# Patient Record
Sex: FEMALE | Race: BLACK OR AFRICAN AMERICAN | NOT HISPANIC OR LATINO | Employment: PART TIME | URBAN - METROPOLITAN AREA
[De-identification: names, ages, dates, MRNs, and addresses within clinical notes are randomized per-mention and may not be internally consistent; named-entity substitution may affect disease eponyms.]

---

## 2017-01-26 ENCOUNTER — OFFICE VISIT (OUTPATIENT)
Dept: OBGYN | Facility: CLINIC | Age: 27
End: 2017-01-26
Payer: COMMERCIAL

## 2017-01-26 VITALS
HEIGHT: 65 IN | DIASTOLIC BLOOD PRESSURE: 80 MMHG | OXYGEN SATURATION: 97 % | HEART RATE: 91 BPM | BODY MASS INDEX: 19.46 KG/M2 | SYSTOLIC BLOOD PRESSURE: 136 MMHG | TEMPERATURE: 98.4 F | WEIGHT: 116.8 LBS

## 2017-01-26 DIAGNOSIS — N97.9 INFERTILITY, FEMALE, SECONDARY: Primary | ICD-10-CM

## 2017-01-26 LAB
ALBUMIN SERPL-MCNC: 4 G/DL (ref 3.4–5)
ALP SERPL-CCNC: 37 U/L (ref 40–150)
ALT SERPL W P-5'-P-CCNC: 23 U/L (ref 0–50)
ANION GAP SERPL CALCULATED.3IONS-SCNC: 10 MMOL/L (ref 3–14)
AST SERPL W P-5'-P-CCNC: 24 U/L (ref 0–45)
B-HCG SERPL-ACNC: <1 IU/L
BILIRUB SERPL-MCNC: 1 MG/DL (ref 0.2–1.3)
BUN SERPL-MCNC: 11 MG/DL (ref 7–30)
CALCIUM SERPL-MCNC: 8.8 MG/DL (ref 8.5–10.1)
CHLORIDE SERPL-SCNC: 107 MMOL/L (ref 94–109)
CO2 SERPL-SCNC: 21 MMOL/L (ref 20–32)
CREAT SERPL-MCNC: 0.74 MG/DL (ref 0.52–1.04)
ERYTHROCYTE [DISTWIDTH] IN BLOOD BY AUTOMATED COUNT: 13.7 % (ref 10–15)
ESTRADIOL SERPL-MCNC: 333 PG/ML
FSH SERPL-ACNC: 2.6 IU/L
GFR SERPL CREATININE-BSD FRML MDRD: ABNORMAL ML/MIN/1.7M2
GLUCOSE SERPL-MCNC: 81 MG/DL (ref 70–99)
HCT VFR BLD AUTO: 38 % (ref 35–47)
HGB BLD-MCNC: 12.5 G/DL (ref 11.7–15.7)
LH SERPL-ACNC: 7.9 IU/L
MCH RBC QN AUTO: 28.3 PG (ref 26.5–33)
MCHC RBC AUTO-ENTMCNC: 32.9 G/DL (ref 31.5–36.5)
MCV RBC AUTO: 86 FL (ref 78–100)
PLATELET # BLD AUTO: 263 10E9/L (ref 150–450)
POTASSIUM SERPL-SCNC: 4.1 MMOL/L (ref 3.4–5.3)
PROGEST SERPL-MCNC: 27.2 NG/ML
PROLACTIN SERPL-MCNC: 9 UG/L (ref 3–27)
PROT SERPL-MCNC: 8.2 G/DL (ref 6.8–8.8)
RBC # BLD AUTO: 4.42 10E12/L (ref 3.8–5.2)
SODIUM SERPL-SCNC: 138 MMOL/L (ref 133–144)
TSH SERPL DL<=0.05 MIU/L-ACNC: 4.14 MU/L (ref 0.4–4)
WBC # BLD AUTO: 2.9 10E9/L (ref 4–11)

## 2017-01-26 PROCEDURE — 84144 ASSAY OF PROGESTERONE: CPT | Performed by: OBSTETRICS & GYNECOLOGY

## 2017-01-26 PROCEDURE — 84443 ASSAY THYROID STIM HORMONE: CPT | Performed by: OBSTETRICS & GYNECOLOGY

## 2017-01-26 PROCEDURE — 84702 CHORIONIC GONADOTROPIN TEST: CPT | Performed by: OBSTETRICS & GYNECOLOGY

## 2017-01-26 PROCEDURE — 85027 COMPLETE CBC AUTOMATED: CPT | Performed by: OBSTETRICS & GYNECOLOGY

## 2017-01-26 PROCEDURE — 36415 COLL VENOUS BLD VENIPUNCTURE: CPT | Performed by: OBSTETRICS & GYNECOLOGY

## 2017-01-26 PROCEDURE — 84270 ASSAY OF SEX HORMONE GLOBUL: CPT | Performed by: OBSTETRICS & GYNECOLOGY

## 2017-01-26 PROCEDURE — 99204 OFFICE O/P NEW MOD 45 MIN: CPT | Performed by: OBSTETRICS & GYNECOLOGY

## 2017-01-26 PROCEDURE — 82670 ASSAY OF TOTAL ESTRADIOL: CPT | Performed by: OBSTETRICS & GYNECOLOGY

## 2017-01-26 PROCEDURE — 87491 CHLMYD TRACH DNA AMP PROBE: CPT | Performed by: OBSTETRICS & GYNECOLOGY

## 2017-01-26 PROCEDURE — 84146 ASSAY OF PROLACTIN: CPT | Performed by: OBSTETRICS & GYNECOLOGY

## 2017-01-26 PROCEDURE — 87591 N.GONORRHOEAE DNA AMP PROB: CPT | Performed by: OBSTETRICS & GYNECOLOGY

## 2017-01-26 PROCEDURE — G0145 SCR C/V CYTO,THINLAYER,RESCR: HCPCS | Performed by: OBSTETRICS & GYNECOLOGY

## 2017-01-26 PROCEDURE — 83001 ASSAY OF GONADOTROPIN (FSH): CPT | Performed by: OBSTETRICS & GYNECOLOGY

## 2017-01-26 PROCEDURE — 80053 COMPREHEN METABOLIC PANEL: CPT | Performed by: OBSTETRICS & GYNECOLOGY

## 2017-01-26 PROCEDURE — 83002 ASSAY OF GONADOTROPIN (LH): CPT | Performed by: OBSTETRICS & GYNECOLOGY

## 2017-01-26 PROCEDURE — 84403 ASSAY OF TOTAL TESTOSTERONE: CPT | Performed by: OBSTETRICS & GYNECOLOGY

## 2017-01-26 PROCEDURE — 82627 DEHYDROEPIANDROSTERONE: CPT | Performed by: OBSTETRICS & GYNECOLOGY

## 2017-01-26 NOTE — NURSING NOTE
"Chief Complaint   Patient presents with     Consult     infertility       Initial /80 mmHg  Pulse 91  Temp(Src) 98.4  F (36.9  C) (Oral)  Ht 5' 4.5\" (1.638 m)  Wt 116 lb 12.8 oz (52.98 kg)  BMI 19.75 kg/m2  SpO2 97%  LMP 01/05/2017 (Exact Date) Estimated body mass index is 19.75 kg/(m^2) as calculated from the following:    Height as of this encounter: 5' 4.5\" (1.638 m).    Weight as of this encounter: 116 lb 12.8 oz (52.98 kg).  BP completed using cuff size: elsa Apple LPN    "

## 2017-01-26 NOTE — Clinical Note
[facility] [place]  [address] [address]  [place] [address]  [phone]      February 2, 2017    Robert Anand  [address] [address] [place] [address]   [place] [address]          Dear Robert,    I am happy to inform you that your recent cervical cancer screening test (PAP smear) was normal.      Preventative screening such as this helps insure your health for years to come.  This test should be repeated in 3 years unless otherwise directed.    You will still need to return to the clinic every year for your annual exam and other preventive tests.    Please contact the clinic if you have further questions.      Sincerely,    Cephas Mawuena Agbeh, MD/sachin

## 2017-01-26 NOTE — PROGRESS NOTES
"Robert is a 27 year old  who presents with difficulty conceiving x 3 years.  Her menstrual periods are irregular.  +  - molima.  Her most recent form of birth control was none. Previous infertility work up included: semen analysis,   diagnostic laparoscopy,  In [facility] in  , she had laparoscopy to repair tubal problem in one adnexa. Since her baby was born, unable to get pregnant. Kirk was diagnosed with anovulation, Cycles irregular and > 35 days. Spouse treated with medication for low sperm count. Has been in the [place] 4 [place].     Her partner is  38 year old who has fathered children previously.  He has  exposure to toxins, radiation or excessive heat.  He is in good health.    OBHX:  x 1  Gyne: Pap smears Normal  Last pap: N/A    ROS: Ten point review of systems was reviewed and negative except the above.    PMH: Her past medical, surgical, and obstetric histories were reviewed and are documented in their appropriate chart areas.    ALL/Meds: Her medication and allergy histories were reviewed and are documented in their appropriate chart areas.    Soc Hx: - tob, - EtOH,   FamHx: N/a    PE: /80 mmHg  Pulse 91  Temp(Src) 98.4  F (36.9  C) (Oral)  Ht 5' 4.5\" (1.638 m)  Wt 116 lb 12.8 oz (52.98 kg)  BMI 19.75 kg/m2  SpO2 97%  LMP 2017 (Exact Date)    General:  WNWD female, NAD  Alert  Oriented x 3  Gait:  Normal  Skin:  Normal skin turgor  Neurologic:  CN grossly intact, good sensation.    HEENT:  NC/AT, EOMI  Neck:  No masses palpated, symmetrical, carotids +2/4, no bruits heard  Heart:  RRR  Lungs:  Clear   Breasts:  Symmetrical, no dimpling noted, no masses palpated,   Abdomen:  Non-tender, non-distended.  Vulva: No external lesions, normal hair distribution, no adenopathy  BUS:  Normal, no masses noted  Vagina: Moist, pink, no abnormal discharge, well rugated, no lesions  Cervix: Smooth, pink, no visible lesions  Uterus: Normal size, anteverted, non-tender, " mobile  Ovaries: No mass, non-tender, mobile  Rectal exam: No mass, non-tender, normal sphincter tone  Extremities:  No clubbing, cyanosis, or edema  A/P     ICD-10-CM    1. Infertility, female, secondary N97.9 Follicle stimulating hormone     Lutropin     TSH     Progesterone     HCG quantitative pregnancy     Estradiol     Testosterone Free and Total     DHEA sulfate     Prolactin     Comprehensive metabolic panel     CBC with platelets     Progesterone     HCG quantitative pregnancy     Neisseria gonorrhoeae PCR     Chlamydia trachomatis PCR     Pap imaged thin layer screen reflex to HPV if ASCUS - recommend age 25 - 29     US Pelvic Complete w Transvaginal     US Hysterosonography     Discussed male and female factors of infertility.  I discussed the association of regular ovulation and regular menstruation.  Discussed possible testing including semen analysis, laboratory evaluation of ovulation, and evaluation of uterine/tubal anatomy.    She is given the opportunity to ask questions and have them answered.     SPOUSE, HUSSAIN GARCIA, MR# [id number]. SA ordered    ACOG pamphlet provided oin the above.    return to clinic after all test results are available.    40 minutes was spent face to face with the patient today discussing her history, diagnosis, and follow-up plan as noted above.  Over 50% of the visit was spent in counseling and coordination of care.    Total Visit Time: 50 minutes.      Interpraterr present for visit.    CEPHAS AGBEH, MD.

## 2017-01-26 NOTE — Clinical Note
[facility] [place]  [address] [address]  [place] [address]  [phone]      January 31, 2017      Robert Anand  [address] [address] [place] [address]   [place] [address]              Dear Robert,    All of your labs were normal for you.  Please contact the clinic if you have additional questions.  Thank you.      Sincerely,      Cephas Mawuena Agbeh MD

## 2017-01-26 NOTE — PATIENT INSTRUCTIONS
If you have any questions regarding your visit, Please contact your care team.    Women s Health CLINIC HOURS TELEPHONE NUMBER   Andrews Agbeh, M.D.    Jeanne Mendez- DAVIAN Zuleta - DAVIAN [place] -         Monday-[facility]  8:00 am - 5 pm  Tuesday- [facility]  8:00am- 5 pm  Wednesday- Off  Thursday- [facility]  8:00 am- 5 pm  Friday-[place]  8:00 am 5 pm [facility]  [address] [place] [address]  [phone] ask for Women's [facility]    Imaging [phone]    [facility]  [address]  [place] [address] [phone]  Imaging [phone]     Urgent Care locations:    [facility] Saturday and Sunday   9 am - 5 pm    Monday-Friday   12 pm - 8 pm  Saturday and Sunday   9 am - 5 pm   [phone]       If you need a medication refill, please contact your pharmacy. Please allow 3 business days for your refill to be completed.  As always, Thank you for trusting us with your healthcare needs!       ian

## 2017-01-26 NOTE — MR AVS SNAPSHOT
After Visit Summary   1/26/2017    Robert Anand    MRN: [id number]           Patient Information     Date Of Birth          1990        Visit Information        Provider Department      1/26/2017 8:15 AM Agbeh, Cephas Mawuena, MD; [employer] [facility]        Today's Diagnoses     Infertility, female, secondary    -  1       Care Instructions                                                           If you have any questions regarding your visit, Please contact your care team.    Women s Health CLINIC HOURS TELEPHONE NUMBER   Cephas Agbeh, M.D.    Jeanne Mendez- DAVIAN Zuleta - DAVIAN [place] -         Monday-[facility]  8:00 am - 5 pm  Tuesday- [facility]  8:00am- 5 pm  Wednesday- Off  Thursday- [facility]  8:00 am- 5 pm  Friday-[place]  8:00 am 5 pm [facility]  [address] [place] [address]  [phone] ask for Women's Clinic    Imaging [phone]    [facility]  [address] [place] [address]  [phone]  Imaging [phone]     Urgent Care locations:    [facility] Saturday and Sunday   9 am - 5 pm    Monday-Friday   12 pm - 8 pm  Saturday and Sunday   9 am - 5 pm   [phone]       If you need a medication refill, please contact your pharmacy. Please allow 3 business days for your refill to be completed.  As always, Thank you for trusting us with your healthcare needs!       m        Follow-ups after your visit        Future tests that were ordered for you today     Open Standing Orders        Priority Remaining Interval Expires Ordered    Progesterone Routine 6/6 1/26/2018 1/26/2017    HCG quantitative pregnancy Routine 6/6 1/26/2018 1/26/2017          Open Future Orders        Priority Expected Expires Ordered    US Hysterosonography Routine  1/26/2018 1/26/2017    US Pelvic Complete w Transvaginal Routine   "2018            Who to contact     If you have questions or need follow up information about today's clinic visit or your schedule please contact [facility] directly at [phone].  Normal or non-critical lab and imaging results will be communicated to you by MyChart, letter or phone within 4 business days after the clinic has received the results. If you do not hear from us within 7 days, please contact the clinic through MyChart or phone. If you have a critical or abnormal lab result, we will notify you by phone as soon as possible.  Submit refill requests through [employer] or call your pharmacy and they will forward the refill request to us. Please allow 3 business days for your refill to be completed.          Additional Information About Your Visit        [email or web address] Information     [employer] lets you send messages to your doctor, view your test results, renew your prescriptions, schedule appointments and more. To sign up, go to [email or web address] . Click on \"Log in\" on the left side of the screen, which will take you to the Welcome page. Then click on \"Sign up Now\" on the right side of the page.     You will be asked to enter the access code listed below, as well as some personal information. Please follow the directions to create your username and password.     Your access code is: [email or web address]  Expires: 2017  9:54 AM     Your access code will  in 90 days. If you need help or a new code, please call your [place] clinic or [phone].        Care EveryWhere ID     This is your Care EveryWhere ID. This could be used by other organizations to access your [place] medical records  [id number]        Your Vitals Were     Pulse Temperature Height BMI (Body Mass Index) Pulse Oximetry Last Period    91 98.4  F (36.9  C) (Oral) 5' 4.5\" (1.638 m) 19.75 kg/m2 97% 2017 (Exact Date)       Blood Pressure from Last 3 Encounters:   17 136/80    Weight from Last 3 " Encounters:   01/26/17 116 lb 12.8 oz (52.98 kg)              We Performed the Following     CBC with platelets     Chlamydia trachomatis PCR     Comprehensive metabolic panel     DHEA sulfate     Estradiol     Follicle stimulating hormone     HCG quantitative pregnancy     Lutropin     Neisseria gonorrhoeae PCR     Pap imaged thin layer screen reflex to HPV if ASCUS - recommend age 25 - 29     Progesterone     Prolactin     Testosterone Free and Total     TSH        Primary Care Provider Office Phone #    Avni Vargas [facility] [phone]       No address on file        Thank you!     Thank you for choosing [facility]  for your care. Our goal is always to provide you with excellent care. Hearing back from our patients is one way we can continue to improve our services. Please take a few minutes to complete the written survey that you may receive in the mail after your visit with us. Thank you!             Your Updated Medication List - Protect others around you: Learn how to safely use, store and throw away your medicines at www.disposemymeds.org.      Notice  As of 1/26/2017  9:57 AM    You have not been prescribed any medications.

## 2017-01-27 LAB
C TRACH DNA SPEC QL NAA+PROBE: NORMAL
DHEA-S SERPL-MCNC: 68 UG/DL (ref 35–430)
N GONORRHOEA DNA SPEC QL NAA+PROBE: NORMAL
SPECIMEN SOURCE: NORMAL
SPECIMEN SOURCE: NORMAL

## 2017-01-30 ENCOUNTER — RADIANT APPOINTMENT (OUTPATIENT)
Dept: ULTRASOUND IMAGING | Facility: CLINIC | Age: 27
End: 2017-01-30
Attending: OBSTETRICS & GYNECOLOGY
Payer: COMMERCIAL

## 2017-01-30 DIAGNOSIS — N97.9 INFERTILITY, FEMALE, SECONDARY: ICD-10-CM

## 2017-01-30 PROCEDURE — 76856 US EXAM PELVIC COMPLETE: CPT

## 2017-01-30 PROCEDURE — 76830 TRANSVAGINAL US NON-OB: CPT

## 2017-01-31 LAB
COPATH REPORT: NORMAL
PAP: NORMAL
SHBG SERPL-SCNC: 102 NMOL/L (ref 30–135)
TESTOST FREE SERPL-MCNC: 0.22 NG/DL (ref 0.08–0.74)
TESTOST SERPL-MCNC: 31 NG/DL (ref 8–60)

## 2017-02-04 ENCOUNTER — TELEPHONE (OUTPATIENT)
Dept: OBGYN | Facility: CLINIC | Age: 27
End: 2017-02-04

## 2017-02-04 NOTE — TELEPHONE ENCOUNTER
Robert was told to schedule an HSG procedure when she gets her period. Her  called to schedule today but the after hours line cannot schedule this appointment. Please call him at [phone] on Monday. They would like to have the appointment on Tuesday if possible.      Thank you,  Cammie LAM

## 2017-02-09 ENCOUNTER — RADIANT APPOINTMENT (OUTPATIENT)
Dept: GENERAL RADIOLOGY | Facility: CLINIC | Age: 27
End: 2017-02-09
Attending: OBSTETRICS & GYNECOLOGY
Payer: COMMERCIAL

## 2017-02-09 DIAGNOSIS — N97.9 INFERTILITY, FEMALE: ICD-10-CM

## 2017-02-09 PROCEDURE — 58340 CATHETER FOR HYSTEROGRAPHY: CPT

## 2017-02-09 PROCEDURE — 74740 X-RAY FEMALE GENITAL TRACT: CPT

## 2017-02-09 RX ORDER — IOPAMIDOL 510 MG/ML
50 INJECTION, SOLUTION INTRAVASCULAR ONCE
Status: COMPLETED | OUTPATIENT
Start: 2017-02-09 | End: 2017-02-09

## 2017-02-09 RX ADMIN — IOPAMIDOL 50 ML: 510 INJECTION, SOLUTION INTRAVASCULAR at 11:45

## 2017-02-20 ENCOUNTER — OFFICE VISIT (OUTPATIENT)
Dept: OBGYN | Facility: CLINIC | Age: 27
End: 2017-02-20
Payer: COMMERCIAL

## 2017-02-20 VITALS
SYSTOLIC BLOOD PRESSURE: 121 MMHG | TEMPERATURE: 97.4 F | OXYGEN SATURATION: 100 % | DIASTOLIC BLOOD PRESSURE: 82 MMHG | HEART RATE: 75 BPM

## 2017-02-20 DIAGNOSIS — N92.6 IRREGULAR MENSES: ICD-10-CM

## 2017-02-20 DIAGNOSIS — N46.9 MALE INFERTILITY: ICD-10-CM

## 2017-02-20 DIAGNOSIS — N97.9 FEMALE INFERTILITY: Primary | ICD-10-CM

## 2017-02-20 PROCEDURE — 99214 OFFICE O/P EST MOD 30 MIN: CPT | Performed by: OBSTETRICS & GYNECOLOGY

## 2017-02-20 NOTE — NURSING NOTE
"Chief Complaint   Patient presents with     Gyn Exam     follow up fertility       Initial /82  Pulse 75  Temp 97.4  F (36.3  C) (Tympanic)  LMP 02/03/2017  SpO2 100% Estimated body mass index is 19.74 kg/(m^2) as calculated from the following:    Height as of 1/26/17: 5' 4.5\" (1.638 m).    Weight as of 1/26/17: 116 lb 12.8 oz (53 kg).  Medication Reconciliation: complete     Jeanne Apple LPN    "

## 2017-02-20 NOTE — PROGRESS NOTES
Robert is a 27 year old  here for follow up of infertility work up results. All her labs and imaging studies  are normal so far. Spouse( HUSSAIN GARCIA) Smen analysis was abnormal. He has a referral appointment with Male infertility Urologist on 3/30/17    Results for orders placed or performed in visit on 17   XR Hysterosalpingogram    Narrative    HSG    Comparisons: None    HISTORY:    Infertility    TECHNIQUE:  Using aseptic technique the cervix was visualized and cleaned with  Betadine solution. A balloon tipped catheter passed easily into the  cervix. The balloon was inflated with 1 cc of sterile saline. The  speculum was removed and contrast was injected while monitoring with  fluoroscopy.    The uterus is normal in appearance. Both fallopian tubes fill normally  with normal spillage into the peritoneal cavity.     The catheter was removed. Care instructions were discussed with the  patient.      Impression    IMPRESSION: Normal HSG.     ANISH KABA MD       ROS: Ten point review of systems was reviewed and negative except the above.  Obstetric History       T1      TAB0   SAB0   E0   M0   L1       # Outcome Date GA Lbr Nabeel/2nd Weight Sex Delivery Anes PTL Lv   1 Term 14   8 lb 6 oz (3.8 kg) M Vag-Spont   Y          Past Surgical History   Procedure Laterality Date     Laproscopy       infertility       PMH: Her past medical, surgical, and obstetric histories were reviewed and are documented in their appropriate chart areas.    ALL/Meds: Her medication and allergy histories were reviewed and are documented in their appropriate chart areas.    SH: - tob, - EtOH,     PE: /82  Pulse 75  Temp 97.4  F (36.3  C) (Tympanic)  LMP 2017  SpO2 100%    General:  WNWD female, NAD  Alert  Oriented x 3  Gait:  Normal  Skin:  Normal skin turgor  HEENT:  NC/AT, EOMI  Abdomen:  Non-tender, non-distended.  Pelvic exam:  Not performed  Extremities:  No clubbing, no  cyanosis and no edema.      A/P     ICD-10-CM    1. Female infertility N97.9    2. Male infertility N46.9    3. Irregular menses N92.6     Discussed all her test results and her spouse abnormal SA.  Will await results from infertility work up from Dr. Barroso.   Patient and spouse used clomid to get pregnant with their son in [place] 3 years ago.    Consider clomid for patient.  25 minutes was spent face to face with the patient today discussing her history, diagnosis, and follow-up plan as noted above.  Over 50% of the visit was spent in counseling and coordination of care.    Total Visit Time: 35 minutes.    Patient , spouse,and interprater were present for visit.  Appointment fir spouse with UROLOGY was made today by my office.    CEPHAS AGBEH, MD.

## 2017-02-20 NOTE — MR AVS SNAPSHOT
"              After Visit Summary   2017    Robert Anand    MRN: [id number]           Patient Information     Date Of Birth          1990        Visit Information        Provider Department      2017 8:15 AM Agbeh, Cephas Mawuena, MD; [employer] [facility] [place]        Today's Diagnoses     Female infertility    -  1    Male infertility        Irregular menses           Follow-ups after your visit        Follow-up notes from your care team     Return in about 2 months (around 2017).      Who to contact     If you have questions or need follow up information about today's clinic visit or your schedule please contact [facility] [place] directly at [phone].  Normal or non-critical lab and imaging results will be communicated to you by MyChart, letter or phone within 4 business days after the clinic has received the results. If you do not hear from us within 7 days, please contact the clinic through MyChart or phone. If you have a critical or abnormal lab result, we will notify you by phone as soon as possible.  Submit refill requests through [employer] or call your pharmacy and they will forward the refill request to us. Please allow 3 business days for your refill to be completed.          Additional Information About Your Visit        MyChart Information     [employer] lets you send messages to your doctor, view your test results, renew your prescriptions, schedule appointments and more. To sign up, go to [email or web address] . Click on \"Log in\" on the left side of the screen, which will take you to the Welcome page. Then click on \"Sign up Now\" on the right side of the page.     You will be asked to enter the access code listed below, as well as some personal information. Please follow the directions to create your username and password.     Your access code is: [email or web address]  Expires: 2017  9:54 AM     Your access code will  in 90 days. If you need help or " a new code, please call your [place] clinic or [phone].        Care EveryWhere ID     This is your Care EveryWhere ID. This could be used by other organizations to access your [place] medical records  [id number]        Your Vitals Were     Pulse Temperature Last Period Pulse Oximetry          75 97.4  F (36.3  C) (Tympanic) 02/03/2017 100%         Blood Pressure from Last 3 Encounters:   02/20/17 121/82   01/26/17 136/80    Weight from Last 3 Encounters:   01/26/17 116 lb 12.8 oz (53 kg)              Today, you had the following     No orders found for display       Primary Care Provider Office Phone #    Avni Vargas [facility] [phone]       No address on file        Thank you!     Thank you for choosing [facility]  for your care. Our goal is always to provide you with excellent care. Hearing back from our patients is one way we can continue to improve our services. Please take a few minutes to complete the written survey that you may receive in the mail after your visit with us. Thank you!             Your Updated Medication List - Protect others around you: Learn how to safely use, store and throw away your medicines at www.disposemymeds.org.      Notice  As of 2/20/2017 10:07 AM    You have not been prescribed any medications.

## 2017-10-20 ENCOUNTER — OFFICE VISIT (OUTPATIENT)
Dept: OBGYN | Facility: CLINIC | Age: 27
End: 2017-10-20
Payer: COMMERCIAL

## 2017-10-20 VITALS
TEMPERATURE: 96.5 F | WEIGHT: 106.4 LBS | HEART RATE: 72 BPM | OXYGEN SATURATION: 100 % | BODY MASS INDEX: 17.98 KG/M2 | SYSTOLIC BLOOD PRESSURE: 130 MMHG | DIASTOLIC BLOOD PRESSURE: 83 MMHG

## 2017-10-20 DIAGNOSIS — N91.2 ABSENCE OF MENSTRUATION: Primary | ICD-10-CM

## 2017-10-20 DIAGNOSIS — N97.9 INFERTILITY, FEMALE, SECONDARY: ICD-10-CM

## 2017-10-20 DIAGNOSIS — R53.83 FATIGUE: Primary | ICD-10-CM

## 2017-10-20 DIAGNOSIS — O21.0 HYPEREMESIS GRAVIDARUM: ICD-10-CM

## 2017-10-20 LAB — BETA HCG QUAL IFA URINE: POSITIVE

## 2017-10-20 PROCEDURE — 84703 CHORIONIC GONADOTROPIN ASSAY: CPT | Performed by: OBSTETRICS & GYNECOLOGY

## 2017-10-20 PROCEDURE — 87086 URINE CULTURE/COLONY COUNT: CPT | Performed by: OBSTETRICS & GYNECOLOGY

## 2017-10-20 PROCEDURE — 99214 OFFICE O/P EST MOD 30 MIN: CPT | Performed by: OBSTETRICS & GYNECOLOGY

## 2017-10-20 RX ORDER — PRENATAL VIT/IRON FUM/FOLIC AC 27MG-0.8MG
1 TABLET ORAL DAILY
Qty: 100 TABLET | Refills: 3 | Status: SHIPPED | OUTPATIENT
Start: 2017-10-20 | End: 2018-02-09

## 2017-10-20 RX ORDER — ONDANSETRON 8 MG/1
8 TABLET, ORALLY DISINTEGRATING ORAL
Qty: 20 TABLET | Refills: 1 | Status: SHIPPED | OUTPATIENT
Start: 2017-10-20 | End: 2018-04-12

## 2017-10-20 NOTE — PATIENT INSTRUCTIONS
If you have any questions regarding your visit, Please contact your care team.    Women s Health CLINIC HOURS TELEPHONE NUMBER   Andrews Agbeh, M.D.    Jeanne Mendez- DAVIAN Lewis - DAVIAN [place] -         Monday-[facility]  8:00 am - 5 pm  Tuesday- [facility]  8:00am- 5 pm  Wednesday- Off  Thursday- [facility]  8:00 am- 5 pm  Friday-[place]  8:00 am 5 pm [facility]  [address] [place] [address]  [phone] ask for Women's [facility]    Imaging [phone]    [facility]  [address] [facility] [address] [facility] [address]  [place] [address] [phone]  Imaging [phone]     Urgent Care locations:    [facility] Saturday and Sunday   9 am - 5 pm    Monday-Friday   12 pm - 8 pm  Saturday and Sunday   9 am - 5 pm   [phone]       If you need a medication refill, please contact your pharmacy. Please allow 3 business days for your refill to be completed.  As always, Thank you for trusting us with your healthcare needs!

## 2017-10-20 NOTE — NURSING NOTE
"Chief Complaint   Patient presents with     Confirmation Of Pregnancy       Initial /83  Pulse 72  Temp 96.5  F (35.8  C) (Tympanic)  Wt 106 lb 6.4 oz (48.3 kg)  LMP 08/27/2017  SpO2 100%  BMI 17.98 kg/m2 Estimated body mass index is 17.98 kg/(m^2) as calculated from the following:    Height as of 1/26/17: 5' 4.5\" (1.638 m).    Weight as of this encounter: 106 lb 6.4 oz (48.3 kg).  Medication Reconciliation: complete   Jeanne Apple LPN    "

## 2017-10-20 NOTE — PROGRESS NOTES
Robert is a 27 year old  referred here by self for consultation regarding pregnancy confirmation. LMP of 17. Approx. 8 weeks . Complaints of severe NV since beginning of this month.   Here with spouse who speaks English. She understands some some English. Palestinian Inerptrater did not show. .    ROS: Ten point review of systems was reviewed and negative except the above.    Gyne: - abn pap (last pap ), - STD's    No past medical history on file.  Past Surgical History:   Procedure Laterality Date     laproscopy      infertility     Patient Active Problem List   Diagnosis     Female infertility     Male infertility     Irregular menses       ALL/Meds: Her medication and allergy histories were reviewed and are documented in their appropriate chart areas.    SH: - tob, - EtOH,     FH: Her family history was reviewed and documented in its appropriate chart area.    PE: /83  Pulse 72  Temp 96.5  F (35.8  C) (Tympanic)  Wt 106 lb 6.4 oz (48.3 kg)  LMP 2017  SpO2 100%  BMI 17.98 kg/m2  Body mass index is 17.98 kg/(m^2).      General:  WNWD female, NAD  Alert  Oriented x 3  Gait:  Normal  Skin:  Normal skin turgor  HEENT:  NC/AT, EOMI  Abdomen:  Non-tender, non-distended.  Pelvic exam:  Not performed  Extremities:  No clubbing, no cyanosis and no edema.    Exam Information   Exam Date Exam Time Accession # Results    10/20/17 11:58 AM [id number]    Component Results   Component Value Flag Ref Range Units Status Collected Lab   Beta HCG Qual IFA Urine Positive (A) NEG^Negative    Final 10/20/2017 11:58 AM BE           A/P    ICD-10-CM    1. Absence of menstruation N91.2 ondansetron (ZOFRAN ODT) 8 MG ODT tab     Prenatal Vit-Fe Fumarate-FA (PRENATAL MULTIVITAMIN PLUS IRON) 27-0.8 MG TABS per tablet      OB < 14 Weeks Single   2. Hyperemesis gravidarum O21.0 ondansetron (ZOFRAN ODT) 8 MG ODT tab     Prenatal Vit-Fe Fumarate-FA (PRENATAL MULTIVITAMIN PLUS IRON) 27-0.8 MG TABS per  tablet     Prenatal package provided.  Antiemetics and PNV ordered.  Work note forms filled.  Work note for spouse provided.  return to clinic in 4 weeks for FOB exam.      25 minutes was spent face to face with the patient today discussing her history, diagnosis, and follow-up plan as noted above.  Over 50% of the visit was spent in counseling and coordination of care.    Total Visit Time: 30 minutes.        CEPHAS AGBEH, MD.

## 2017-10-20 NOTE — LETTER
October 20, 2017              To Whom It May Concern,     Robert Anand was seen in the clinic today.      Sincerely,        Cephas Mawuena Agbeh, MD

## 2017-10-20 NOTE — MR AVS SNAPSHOT
After Visit Summary   10/20/2017    Robert Anand    MRN: [id number]           Patient Information     Date Of Birth          1990        Visit Information        Provider Department      10/20/2017 11:00 AM Agbeh, Cephas Mawuena, MD; MULTILINGUAL WORD [facility]        Today's Diagnoses     Absence of menstruation    -  1    Hyperemesis gravidarum          Care Instructions                                                           If you have any questions regarding your visit, Please contact your care team.    Women s Health CLINIC HOURS TELEPHONE NUMBER   Cephas Agbeh, M.D.    Jeanne - ROSALVA Mendez- RN    Debbie - RN    Mell -         Monday-[facility]  8:00 am - 5 pm  Tuesday- [facility]  8:00am- 5 pm  Wednesday- Off  Thursday- [facility]  8:00 am- 5 pm  Friday-[place]  8:00 am 5 pm [facility]  [address] [place] [address]  [phone] ask for Women's Clinic    Imaging [phone]    [facility]  [address]  [place] [address]  [phone]  Imaging [phone]     Urgent Care locations:    [facility] Saturday and Sunday   9 am - 5 pm    Monday-Friday   12 pm - 8 pm  Saturday and Sunday   9 am - 5 pm   [phone]       If you need a medication refill, please contact your pharmacy. Please allow 3 business days for your refill to be completed.  As always, Thank you for trusting us with your healthcare needs!                 Follow-ups after your visit        Your next 10 appointments already scheduled     Oct 20, 2017  1:45 PM CDT   LAB with BE LAB   [facility]    [address] [id number] [phone]           Patient must bring picture ID. Patient should be prepared to give a urine specimen  Please do not eat 10-12 hours before your appointment if you are coming in fasting  for labs on lipids, cholesterol, or glucose (sugar). Pregnant women should follow their Care Team instructions. Water with medications is okay. Do not drink coffee or other fluids. If you have concerns about taking  your medications, please ask at office or if scheduling via [employer], send a message by clicking on Secure Messaging, Message Your Care Team.            Nov 17, 2017  8:15 AM CST   US OB < 14 WEEKS SINGLE with BEUS1   [facility]    [address] [id number] [phone]           Please bring a list of your medicines (including vitamins, minerals and over-the-counter drugs). Also, tell your doctor about any allergies you may have. Wear comfortable clothes and leave your valuables at home.  If you re less than 20 weeks drink four 8-ounce glasses of fluid an hour before your exam. If you need to empty your bladder before your exam, try to release only a little urine. Then, drink another glass of fluid.  You may have up to two family members in the exam room. If you bring a small child, an adult must be there to care for him or her.  Please call the Imaging Department at your exam site with any questions.            Nov 17, 2017  9:00 AM CST   New Prenatal with Cephas Mawuena Agbeh, MD   [facility] ([facility]    [address] [id number] [phone]              Future tests that were ordered for you today     Open Future Orders        Priority Expected Expires Ordered    US OB < 14 Weeks Single Routine 10/20/2017 1/18/2018 10/20/2017            Who to contact     If you have questions or need follow up information about today's clinic visit or your schedule please contact [facility] directly at [phone].  Normal or non-critical lab and imaging results will be communicated to you by MyChart, letter or phone within 4 business days after the clinic has received the results. If  "you do not hear from us within 7 days, please contact the clinic through [employer] or phone. If you have a critical or abnormal lab result, we will notify you by phone as soon as possible.  Submit refill requests through [employer] or call your pharmacy and they will forward the refill request to us. Please allow 3 business days for your refill to be completed.          Additional Information About Your Visit        Work in [employer] Information     [employer] lets you send messages to your doctor, view your test results, renew your prescriptions, schedule appointments and more. To sign up, go to [email or web address] . Click on \"Log in\" on the left side of the screen, which will take you to the Welcome page. Then click on \"Sign up Now\" on the right side of the page.     You will be asked to enter the access code listed below, as well as some personal information. Please follow the directions to create your username and password.     Your access code is: [email or web address]  Expires: 2018 12:47 PM     Your access code will  in 90 days. If you need help or a new code, please call your [place] clinic or [phone].        Care EveryWhere ID     This is your Care EveryWhere ID. This could be used by other organizations to access your [place] medical records  [id number]        Your Vitals Were     Pulse Temperature Last Period Pulse Oximetry BMI (Body Mass Index)       72 96.5  F (35.8  C) (Tympanic) 2017 100% 17.98 kg/m2        Blood Pressure from Last 3 Encounters:   10/20/17 130/83   17 121/82   17 136/80    Weight from Last 3 Encounters:   10/20/17 106 lb 6.4 oz (48.3 kg)   17 116 lb 12.8 oz (53 kg)                 Today's Medication Changes          These changes are accurate as of: 10/20/17 12:47 PM.  If you have any questions, ask your nurse or doctor.               Start taking these medicines.        Dose/Directions    ondansetron 8 MG ODT tab   Commonly known as:  ZOFRAN ODT   Used for:  " Hyperemesis gravidarum, Absence of menstruation   Started by:  Agbeh, Cephas Mawuena, MD        Dose:  8 mg   Take 1 tablet (8 mg) by mouth 3 times daily (before meals)   Quantity:  20 tablet   Refills:  1       prenatal multivitamin plus iron 27-0.8 MG Tabs per tablet   Used for:  Hyperemesis gravidarum, Absence of menstruation   Started by:  Agbeh, Cephas Mawuena, MD        Dose:  1 tablet   Take 1 tablet by mouth daily   Quantity:  100 tablet   Refills:  3            Where to get your medicines      These medications were sent to [facility] - [address]  [address] [place] [address]     Phone:  [phone]     ondansetron 8 MG ODT tab    prenatal multivitamin plus iron 27-0.8 MG Tabs per tablet                Primary Care Provider Office Phone # Fax #    [facility] [phone] [address]  [place] [address]        Equal Access to Services     [facility] : Hadii aad ku hadasho Soomaali, waaxda luqadaha, qaybta kaalmada adeegyada, waxay idiin hayjojon joy matias . So [facility] [phone].    ATENCIÓN: Si habla español, tiene a ramos disposición servicios gratuitos de asistencia lingüística. Llame al [phone].    We comply with applicable federal civil rights laws and [place] laws. We do not discriminate on the basis of race, color, national origin, age, disability, sex, sexual orientation, or gender identity.            Thank you!     Thank you for choosing [facility]  for your care. Our goal is always to provide you with excellent care. Hearing back from our patients is one way we can continue to improve our services. Please take a few minutes to complete the written survey that you may receive in the mail after your visit with us. Thank you!             Your Updated Medication List - Protect others around you: Learn how to safely use, store and throw away your medicines at www.disposemymeds.org.           This list is accurate as of: 10/20/17 12:47 PM.  Always use your most recent med list.                   Brand Name Dispense Instructions for use Diagnosis    ondansetron 8 MG ODT tab    ZOFRAN ODT    20 tablet    Take 1 tablet (8 mg) by mouth 3 times daily (before meals)    Hyperemesis gravidarum, Absence of menstruation       prenatal multivitamin plus iron 27-0.8 MG Tabs per tablet     100 tablet    Take 1 tablet by mouth daily    Hyperemesis gravidarum, Absence of menstruation

## 2017-10-21 LAB
BACTERIA SPEC CULT: NO GROWTH
SPECIMEN SOURCE: NORMAL

## 2017-11-17 ENCOUNTER — RADIANT APPOINTMENT (OUTPATIENT)
Dept: ULTRASOUND IMAGING | Facility: CLINIC | Age: 27
End: 2017-11-17
Attending: OBSTETRICS & GYNECOLOGY
Payer: COMMERCIAL

## 2017-11-17 ENCOUNTER — PRENATAL OFFICE VISIT (OUTPATIENT)
Dept: OBGYN | Facility: CLINIC | Age: 27
End: 2017-11-17
Payer: COMMERCIAL

## 2017-11-17 VITALS
WEIGHT: 105.6 LBS | TEMPERATURE: 97.2 F | OXYGEN SATURATION: 100 % | HEART RATE: 70 BPM | DIASTOLIC BLOOD PRESSURE: 71 MMHG | SYSTOLIC BLOOD PRESSURE: 115 MMHG | BODY MASS INDEX: 17.85 KG/M2

## 2017-11-17 DIAGNOSIS — N91.2 ABSENCE OF MENSTRUATION: ICD-10-CM

## 2017-11-17 DIAGNOSIS — Z34.80 SUPERVISION OF OTHER NORMAL PREGNANCY, ANTEPARTUM: Primary | ICD-10-CM

## 2017-11-17 LAB
ABO + RH BLD: NORMAL
ABO + RH BLD: NORMAL
BASOPHILS # BLD AUTO: 0 10E9/L (ref 0–0.2)
BASOPHILS NFR BLD AUTO: 0 %
BLD GP AB SCN SERPL QL: NORMAL
BLOOD BANK CMNT PATIENT-IMP: NORMAL
DIFFERENTIAL METHOD BLD: ABNORMAL
EOSINOPHIL # BLD AUTO: 0 10E9/L (ref 0–0.7)
EOSINOPHIL NFR BLD AUTO: 0.4 %
ERYTHROCYTE [DISTWIDTH] IN BLOOD BY AUTOMATED COUNT: 21.4 % (ref 10–15)
HBV SURFACE AG SERPL QL IA: NONREACTIVE
HCT VFR BLD AUTO: 33.5 % (ref 35–47)
HGB BLD-MCNC: 10.9 G/DL (ref 11.7–15.7)
HIV 1+2 AB+HIV1 P24 AG SERPL QL IA: NONREACTIVE
LYMPHOCYTES # BLD AUTO: 1 10E9/L (ref 0.8–5.3)
LYMPHOCYTES NFR BLD AUTO: 40.7 %
MCH RBC QN AUTO: 24.8 PG (ref 26.5–33)
MCHC RBC AUTO-ENTMCNC: 32.5 G/DL (ref 31.5–36.5)
MCV RBC AUTO: 76 FL (ref 78–100)
MONOCYTES # BLD AUTO: 0.3 10E9/L (ref 0–1.3)
MONOCYTES NFR BLD AUTO: 10.1 %
NEUTROPHILS # BLD AUTO: 1.2 10E9/L (ref 1.6–8.3)
NEUTROPHILS NFR BLD AUTO: 48.8 %
PLATELET # BLD AUTO: 298 10E9/L (ref 150–450)
RBC # BLD AUTO: 4.39 10E12/L (ref 3.8–5.2)
SPECIMEN EXP DATE BLD: NORMAL
WBC # BLD AUTO: 2.5 10E9/L (ref 4–11)

## 2017-11-17 PROCEDURE — 86850 RBC ANTIBODY SCREEN: CPT | Performed by: OBSTETRICS & GYNECOLOGY

## 2017-11-17 PROCEDURE — 76801 OB US < 14 WKS SINGLE FETUS: CPT

## 2017-11-17 PROCEDURE — 99207 ZZC FIRST OB VISIT: CPT | Performed by: OBSTETRICS & GYNECOLOGY

## 2017-11-17 PROCEDURE — 36415 COLL VENOUS BLD VENIPUNCTURE: CPT | Performed by: OBSTETRICS & GYNECOLOGY

## 2017-11-17 PROCEDURE — G0499 HEPB SCREEN HIGH RISK INDIV: HCPCS | Performed by: OBSTETRICS & GYNECOLOGY

## 2017-11-17 PROCEDURE — 87389 HIV-1 AG W/HIV-1&-2 AB AG IA: CPT | Performed by: OBSTETRICS & GYNECOLOGY

## 2017-11-17 PROCEDURE — 86762 RUBELLA ANTIBODY: CPT | Performed by: OBSTETRICS & GYNECOLOGY

## 2017-11-17 PROCEDURE — 87491 CHLMYD TRACH DNA AMP PROBE: CPT | Performed by: OBSTETRICS & GYNECOLOGY

## 2017-11-17 PROCEDURE — 85025 COMPLETE CBC W/AUTO DIFF WBC: CPT | Performed by: OBSTETRICS & GYNECOLOGY

## 2017-11-17 PROCEDURE — 86900 BLOOD TYPING SEROLOGIC ABO: CPT | Performed by: OBSTETRICS & GYNECOLOGY

## 2017-11-17 PROCEDURE — 86901 BLOOD TYPING SEROLOGIC RH(D): CPT | Performed by: OBSTETRICS & GYNECOLOGY

## 2017-11-17 PROCEDURE — 86780 TREPONEMA PALLIDUM: CPT | Performed by: OBSTETRICS & GYNECOLOGY

## 2017-11-17 PROCEDURE — 87591 N.GONORRHOEAE DNA AMP PROB: CPT | Performed by: OBSTETRICS & GYNECOLOGY

## 2017-11-17 NOTE — MR AVS SNAPSHOT
After Visit Summary   11/17/2017    Robert Anand    MRN: [id number]           Patient Information     Date Of Birth          1990        Visit Information        Provider Department      11/17/2017 9:00 AM Agbeh, Cephas Mawuena, MD; PHONE,  [facility] [place]        Care Instructions                                                           If you have any questions regarding your visit, Please contact your care team.    Women s Health CLINIC HOURS TELEPHONE NUMBER   Cephas Agbeh, M.D.    Jeanne - ROSALVA Mendez- DAVIAN Lewis - RN    Mell -         Monday-[facility]  8:00 am - 5 pm  Tuesday- [facility]  8:00am- 5 pm  Wednesday- Off  Thursday- [facility]  8:00 am- 5 pm  Friday-[place]  8:00 am 5 pm [facility]  [address]  [place], [place] [address] [phone] ask for Women's Clinic    Imaging [phone]    [facility]  [address]  [place] [address] [phone]  Imaging [phone]     Urgent Care locations:    [facility] Saturday and Sunday   9 am - 5 pm    Monday-Friday   12 pm - 8 pm  Saturday and Sunday   9 am - 5 pm   [phone]       If you need a medication refill, please contact your pharmacy. Please allow 3 business days for your refill to be completed.  As always, Thank you for trusting us with your healthcare needs!                 Follow-ups after your visit        Your next 10 appointments already scheduled     Dec 22, 2017  8:00 AM CST   ESTABLISHED PRENATAL with Cephas Mawuena Agbeh, MD   [facility] ([facility]    [address]  [place] [address] [phone]              Who to contact     If you have questions or need follow up information about today's clinic visit or your schedule please contact [facility] [place] directly at [phone].  Normal or non-critical lab and imaging  "results will be communicated to you by MyChart, letter or phone within 4 business days after the clinic has received the results. If you do not hear from us within 7 days, please contact the clinic through [employer] or phone. If you have a critical or abnormal lab result, we will notify you by phone as soon as possible.  Submit refill requests through [employer] or call your pharmacy and they will forward the refill request to us. Please allow 3 business days for your refill to be completed.          Additional Information About Your Visit        [employer] Information     [employer] lets you send messages to your doctor, view your test results, renew your prescriptions, schedule appointments and more. To sign up, go to [email or web address] . Click on \"Log in\" on the left side of the screen, which will take you to the Welcome page. Then click on \"Sign up Now\" on the right side of the page.     You will be asked to enter the access code listed below, as well as some personal information. Please follow the directions to create your username and password.     Your access code is: [email or web address]  Expires: 2018 11:47 AM     Your access code will  in 90 days. If you need help or a new code, please call your [place] clinic or [phone].        Care EveryWhere ID     This is your Care EveryWhere ID. This could be used by other organizations to access your [place] medical records  [id number]        Your Vitals Were     Pulse Temperature Last Period Pulse Oximetry BMI (Body Mass Index)       70 97.2  F (36.2  C) (Tympanic) 2017 100% 17.85 kg/m2        Blood Pressure from Last 3 Encounters:   17 115/71   10/20/17 130/83   17 121/82    Weight from Last 3 Encounters:   17 105 lb 9.6 oz (47.9 kg)   10/20/17 106 lb 6.4 oz (48.3 kg)   17 116 lb 12.8 oz (53 kg)              Today, you had the following     No orders found for display       Primary Care Provider Office Phone # Fax #    [facility] " [facility] [phone]       [address] [address] [address]        Equal Access to Services     KT FOSTER : Hadii aad ku hadliliya Sojm, wajohnsonda luqtrenton, qadenisseta kaabnerda camille, nanda quinnsree prieto. So [facility] [phone].    ATENCIÓN: Si habla español, tiene a ramos disposición servicios gratuitos de asistencia lingüística. Llame al [phone].    We comply with applicable federal civil rights laws and [place] laws. We do not discriminate on the basis of race, color, national origin, age, disability, sex, sexual orientation, or gender identity.            Thank you!     Thank you for choosing [facility]  for your care. Our goal is always to provide you with excellent care. Hearing back from our patients is one way we can continue to improve our services. Please take a few minutes to complete the written survey that you may receive in the mail after your visit with us. Thank you!             Your Updated Medication List - Protect others around you: Learn how to safely use, store and throw away your medicines at www.disposemymeds.org.          This list is accurate as of: 11/17/17  9:16 AM.  Always use your most recent med list.                   Brand Name Dispense Instructions for use Diagnosis    ondansetron 8 MG ODT tab    ZOFRAN ODT    20 tablet    Take 1 tablet (8 mg) by mouth 3 times daily (before meals)    Hyperemesis gravidarum, Absence of menstruation       prenatal multivitamin plus iron 27-0.8 MG Tabs per tablet     100 tablet    Take 1 tablet by mouth daily    Hyperemesis gravidarum, Absence of menstruation

## 2017-11-17 NOTE — PROGRESS NOTES
Robert is a 27 year old  @  12w2d weeks here for new ob visit.    She requests chewable vitamins.  See Ob questionnaire for pertinent components of HPI.  Current Issues include: none  ULTRASOUND OB LESS THAN 14 WEEKS SINGLE  2017 8:45 AM     HISTORY:  Absence of menstruation.     TECHNIQUE: Transabdominal and transvaginal imaging were performed.   Transvaginal imaging was performed to better evaluate the uterus and  gestational sac.     COMPARISON:  None.     FINDINGS:       Estimated gestational age by current ultrasound measurement: 12 weeks  2 days.  Estimated date of delivery based on this ultrasound: 2018.  Crown-rump length: 5.6 cm.   Embryonic cardiac activity: 160 bpm.   Yolk sac: Not currently identified. Posterior placenta is noted.  Subchorionic hemorrhage: None.     Right ovary: Unremarkable.  Left ovary: Corpus luteal cyst.  Adnexal mass: None.  Free pelvic fluid: None.         IMPRESSION: Live intrauterine pregnancy measures 12 weeks 2 days with  an estimated date of delivery 2018.     WHITNEY FIELDS MD  Obstetric History       T1      L1     SAB0   TAB0   Ectopic0   Multiple0   Live Births1       # Outcome Date GA Lbr Nabeel/2nd Weight Sex Delivery Anes PTL Lv   2 Current            1 Term 14   8 lb 6 oz (3.8 kg) M Vag-Spont   HENRY            Past Surgical History:   Procedure Laterality Date     laproscopy      infertility     No past medical history on file.  Past Surgical History:   Procedure Laterality Date     laproscopy      infertility     Patient Active Problem List    Diagnosis Date Noted     Supervision of other normal pregnancy, antepartum 2017     Priority: Medium     Female infertility 2017     Priority: Medium     Male infertility 2017     Priority: Medium     Irregular menses 2017     Priority: Medium      No Known Allergies  Current Outpatient Prescriptions   Medication Sig Dispense Refill     multivitamin CF formula  (CHOICEFUL) chewable tablet Take 1 tablet by mouth daily 90 tablet 3     ondansetron (ZOFRAN ODT) 8 MG ODT tab Take 1 tablet (8 mg) by mouth 3 times daily (before meals) 20 tablet 1     Prenatal Vit-Fe Fumarate-FA (PRENATAL MULTIVITAMIN PLUS IRON) 27-0.8 MG TABS per tablet Take 1 tablet by mouth daily (Patient not taking: Reported on 2017) 100 tablet 3       Past Medical History of Father of Baby:   No significant medical history    Physical Exam: /71  Pulse 70  Temp 97.2  F (36.2  C) (Tympanic)  Wt 105 lb 9.6 oz (47.9 kg)  LMP 2017  SpO2 100%  BMI 17.85 kg/m2  General: Thin  Skin: Normal  HEENT: Normal  Neck: Supple,no adenopathy,thyroid normal  Chest: Clear  Heart: Regular rate, rhythm,No murmur, rub, gallop  Breasts: No masses, skin, nipple or axillary changes   Abdomen: Benign,Soft, flat, non-tender,No masses, organomegaly,No inguinal nodes,Bowel sounds normoactive   Extremities: Normal  Neurological: Normal   Perineum: Intact   Vulva: Normal  Vagina: Normal mucosa, no discharge  Cervix: Parous, closed, mobile, no discharge  Uterus: 12 weeks, Normal shape, position and consistency   Adnexa: Normal  Rectum: deferred,   Bony Pelvis: Adequate       A/P 27 year old  at  12w2d weeks    - Discussed physician coverage, tertiary support, diet, exercise, weight gain, schedule of visits, routine and indicated ultrasounds, and childbirth education.    - Options for  testing for chromosomal and birth defects were discussed with the patient.  Diagnostic tests include CVS and Amniocentesis.  We discussed that these tests are definitive but invasive and do carry a risk of fetal loss.    Screening tests include nuchal translucency/blood marker testing in the first trimester and quad screening in the second trimester.  We discussed that these are screening tests and not diagnostic tests and that false positives and negatives are a distinct possibility.     return to clinic in 4  piper.    CEPHAS AGBEH, MD.

## 2017-11-17 NOTE — LETTER
November 21, 2017      Robert Anand  [address]   [place] [address]        Dear Robert,     All of your labs were normal for you.  Your hemoglobin was low indicating anemia.  Anemia can cause fatigue and, occasionally, light-headedness.  This is usually caused by an iron deficiency in women.  A diet higher in iron rich foods such as lean red meat and green, leafy vegetables is good for this.  A daily iron supplement may be useful in improving this too.  I would suggest rechecking this in 3 months.  Supplement 325mg iron twice daily.    Please contact the clinic if you have additional questions.  Thank you.      Sincerely,        Cephas Mawuena Agbeh, MD

## 2017-11-17 NOTE — PATIENT INSTRUCTIONS
If you have any questions regarding your visit, Please contact your care team.    Women s Health CLINIC HOURS TELEPHONE NUMBER   Andrews Agbeh, M.D.    Jeanne Mendez- DAVIAN Lewis - DAVIAN Monte -         Monday-[facility]  8:00 am - 5 pm  Tuesday- [facility]  8:00am- 5 pm  Wednesday- Off  Thursday- [facility]  8:00 am- 5 pm  Friday-[place]  8:00 am 5 pm [facility]  [address] [place] [address]  [phone] ask for [facility]    Imaging [phone]    [facility]  [address]  [place] [address] [phone]  Imaging [phone]     Urgent Care locations:    [facility] Saturday and Sunday   9 am - 5 pm    Monday-Friday   12 pm - 8 pm  Saturday and Sunday   9 am - 5 pm   [phone]       If you need a medication refill, please contact your pharmacy. Please allow 3 business days for your refill to be completed.  As always, Thank you for trusting us with your healthcare needs!

## 2017-11-18 LAB — T PALLIDUM IGG+IGM SER QL: NEGATIVE

## 2017-11-19 LAB
C TRACH DNA SPEC QL NAA+PROBE: NEGATIVE
N GONORRHOEA DNA SPEC QL NAA+PROBE: NEGATIVE
SPECIMEN SOURCE: NORMAL
SPECIMEN SOURCE: NORMAL

## 2017-11-21 LAB — RUBV IGG SERPL IA-ACNC: 241 IU/ML

## 2017-12-22 ENCOUNTER — PRENATAL OFFICE VISIT (OUTPATIENT)
Dept: OBGYN | Facility: CLINIC | Age: 27
End: 2017-12-22
Payer: COMMERCIAL

## 2017-12-22 VITALS
SYSTOLIC BLOOD PRESSURE: 114 MMHG | HEART RATE: 110 BPM | TEMPERATURE: 98.2 F | BODY MASS INDEX: 18.42 KG/M2 | DIASTOLIC BLOOD PRESSURE: 72 MMHG | WEIGHT: 109 LBS

## 2017-12-22 DIAGNOSIS — Z34.80 SUPERVISION OF OTHER NORMAL PREGNANCY, ANTEPARTUM: Primary | ICD-10-CM

## 2017-12-22 PROCEDURE — 99207 ZZC PRENATAL VISIT: CPT | Performed by: OBSTETRICS & GYNECOLOGY

## 2017-12-22 NOTE — MR AVS SNAPSHOT
After Visit Summary   12/22/2017    Robert Anand    MRN: [id number]           Patient Information     Date Of Birth          1990        Visit Information        Provider Department      12/22/2017 7:45 AM Agbeh, Cephas Mawuena, MD; LANGUAGE [facility]        Today's Diagnoses     Supervision of other normal pregnancy, antepartum,second trimester    -  1      Care Instructions                                                        If you have any questions regarding your visit, Please contact your care team.      Women s Health CLINIC HOURS TELEPHONE NUMBER   Cephas Agbeh, M.D.      [place] -      Susanna Angel-ALEXANDRA Monday-[place]  8:00a.m-4:45 p.m  Tuesday--[place]   8:00a.m-4:45 p.m.  [facility]  8:00[facility].m-4:45 p.m.  Friday-[facility]  [address] [place] [address]  [phone]    [phone]    [facility]  [address] [address]  [phone]  [id number]-[phone]   Urgent Care locations:    [facility] Monday-Friday  5 pm - 9 pm  Saturday and Sunday   9 am - 5 pm    Monday-Friday   11 pm - 9 pm  Saturday and Sunday   9 am - 5 pm   [phone]     If you need a medication refill, please contact your pharmacy. Please allow 3 business days for your refill to be completed.  As always, Thank you for trusting us with your healthcare needs!              Follow-ups after your visit        Your next 10 appointments already scheduled     Jan 12, 2018  1:00 PM CST   (Arrive by 12:45 PM)   US OB > 14 WEEKS COMPLETE SINGLE with BEUS1   [facility] ([facility])    [address] [address]   [phone]           Please bring a list of your medicines (including vitamins, minerals and over-the-counter drugs). Also, tell your doctor about any allergies you may have. Wear comfortable clothes and leave  "your valuables at home.  If you re less than 20 weeks drink four 8-ounce glasses of fluid an hour before your exam. If you need to empty your bladder before your exam, try to release only a little urine. Then, drink another glass of fluid.  You may have up to two family members in the exam room. If you bring a small child, an adult must be there to care for him or her.  Please call the Imaging Department at your exam site with any questions.            Jan 19, 2018  1:45 PM CST   ESTABLISHED PRENATAL with Cephas Mawuena Agbeh, MD   [facility] ([facility]    [address] [address] [phone]              Future tests that were ordered for you today     Open Future Orders        Priority Expected Expires Ordered    US OB > 14 Weeks Complete Single Routine 1/12/2018 12/22/2018 12/22/2017            Who to contact     If you have questions or need follow up information about today's clinic visit or your schedule please contact [facility] directly at [phone].  Normal or non-critical lab and imaging results will be communicated to you by [employer], letter or phone within 4 business days after the clinic has received the results. If you do not hear from us within 7 days, please contact the clinic through [employer] or phone. If you have a critical or abnormal lab result, we will notify you by phone as soon as possible.  Submit refill requests through [employer] or call your pharmacy and they will forward the refill request to us. Please allow 3 business days for your refill to be completed.          Additional Information About Your Visit        [employer] Information     [employer] lets you send messages to your doctor, view your test results, renew your prescriptions, schedule appointments and more. To sign up, go to [email or web address] . Click on \"Log in\" on the left side of the screen, which will take you to the Welcome page. Then click on \"Sign up Now\" " on the right side of the page.     You will be asked to enter the access code listed below, as well as some personal information. Please follow the directions to create your username and password.     Your access code is: [email or web address]  Expires: 2018 11:47 AM     Your access code will  in 90 days. If you need help or a new code, please call your [facility] or [phone].        Care EveryWhere ID     This is your Care EveryWhere ID. This could be used by other organizations to access your [facility] medical records  [id number]        Your Vitals Were     Pulse Temperature Last Period Breastfeeding? BMI (Body Mass Index)       110 98.2  F (36.8  C) (Oral) 2017 No 18.42 kg/m2        Blood Pressure from Last 3 Encounters:   17 114/72   17 115/71   10/20/17 130/83    Weight from Last 3 Encounters:   17 109 lb (49.4 kg)   17 105 lb 9.6 oz (47.9 kg)   10/20/17 106 lb 6.4 oz (48.3 kg)               Primary Care Provider Office Phone # Fax #    [facility] [phone]       [address] [address]        Equal Access to Services     MURTAZA FOSTER AH: Hadii aad ku hadasho Soomaali, waaxda luqadaha, qaybta kaalmada adeegyada, waxay idiin hayaan joy matias . So [facility] [phone].    ATENCIÓN: Si habla español, tiene a ramos disposición servicios gratuitos de asistencia lingüística. Llame al [phone].    We comply with applicable federal civil rights laws and [place] laws. We do not discriminate on the basis of race, color, national origin, age, disability, sex, sexual orientation, or gender identity.            Thank you!     Thank you for choosing [facility]  for your care. Our goal is always to provide you with excellent care. Hearing back from our patients is one way we can continue to improve our services. Please take a few minutes to complete the written survey that you may receive in the mail after your visit  with us. Thank you!             Your Updated Medication List - Protect others around you: Learn how to safely use, store and throw away your medicines at www.disposemymeds.org.          This list is accurate as of: 12/22/17  8:33 AM.  Always use your most recent med list.                   Brand Name Dispense Instructions for use Diagnosis    multivitamin CF formula chewable tablet    CHOICEFUL    90 tablet    Take 1 tablet by mouth daily    Supervision of other normal pregnancy, antepartum       ondansetron 8 MG ODT tab    ZOFRAN ODT    20 tablet    Take 1 tablet (8 mg) by mouth 3 times daily (before meals)    Hyperemesis gravidarum, Absence of menstruation       prenatal multivitamin plus iron 27-0.8 MG Tabs per tablet     100 tablet    Take 1 tablet by mouth daily    Hyperemesis gravidarum, Absence of menstruation

## 2017-12-22 NOTE — PROGRESS NOTES
17w2d. Doing well without issues/concerns.  Quad screen not done per pt request .  Routine anticipatory guidance.  U/S  Ordered. return to clinic in 3 weeks.    ICD-10-CM    1. Supervision of other normal pregnancy, antepartum,second trimester Z34.80 US OB > 14 Weeks Complete Single     CEPHAS AGBEH, MD.

## 2017-12-22 NOTE — NURSING NOTE
"Chief Complaint   Patient presents with     Prenatal Care     OBV 17w2d       Initial /72 (BP Location: Left arm, Patient Position: Chair, Cuff Size: Adult Regular)  Pulse 110  Temp 98.2  F (36.8  C) (Oral)  Wt 109 lb (49.4 kg)  LMP 08/27/2017  Breastfeeding? No  BMI 18.42 kg/m2 Estimated body mass index is 18.42 kg/(m^2) as calculated from the following:    Height as of 1/26/17: 5' 4.5\" (1.638 m).    Weight as of this encounter: 109 lb (49.4 kg).  Medication Reconciliation: complete   Kinga Cesar CMA      "

## 2017-12-22 NOTE — PATIENT INSTRUCTIONS
If you have any questions regarding your visit, Please contact your care team.      Women s Health CLINIC HOURS TELEPHONE NUMBER   Cephas Agbeh, M.D. Lisa -      Susanna Churchill Monday-[place]  8:00a.m-4:45 p.m  Tuesday--[place]   8:00a.m-4:45 p.m.  Thursday-[place]  8:00a.m-4:45 p.m.  Friday-[facility]  [address] [place] [address]  [phone]    [phone]    [facility]  [address] [address]  [phone]  [id number]-[phone]   Urgent Care locations:    [facility] Monday-Friday  5 pm - 9 pm  Saturday and Sunday   9 am - 5 pm    Monday-Friday   11 pm - 9 pm  Saturday and Sunday   9 am - 5 pm   [phone]     If you need a medication refill, please contact your pharmacy. Please allow 3 business days for your refill to be completed.  As always, Thank you for trusting us with your healthcare needs!

## 2018-01-11 ENCOUNTER — TELEPHONE (OUTPATIENT)
Dept: OBGYN | Facility: CLINIC | Age: 28
End: 2018-01-11

## 2018-01-11 NOTE — TELEPHONE ENCOUNTER
Patient is pregnant. Needs 2 root canals, xrays local anes. And Pain meds. Form signed off by Dr. Agbeh, faxed back to [phone]. Sent to scanning

## 2018-01-12 ENCOUNTER — PRENATAL OFFICE VISIT (OUTPATIENT)
Dept: OBGYN | Facility: CLINIC | Age: 28
End: 2018-01-12
Payer: COMMERCIAL

## 2018-01-12 ENCOUNTER — RADIANT APPOINTMENT (OUTPATIENT)
Dept: ULTRASOUND IMAGING | Facility: CLINIC | Age: 28
End: 2018-01-12
Attending: OBSTETRICS & GYNECOLOGY
Payer: COMMERCIAL

## 2018-01-12 VITALS
DIASTOLIC BLOOD PRESSURE: 67 MMHG | HEART RATE: 98 BPM | SYSTOLIC BLOOD PRESSURE: 114 MMHG | WEIGHT: 112.4 LBS | OXYGEN SATURATION: 100 % | BODY MASS INDEX: 19 KG/M2

## 2018-01-12 DIAGNOSIS — Z34.80 SUPERVISION OF OTHER NORMAL PREGNANCY, ANTEPARTUM: ICD-10-CM

## 2018-01-12 DIAGNOSIS — Z34.80 SUPERVISION OF OTHER NORMAL PREGNANCY, ANTEPARTUM: Primary | ICD-10-CM

## 2018-01-12 PROCEDURE — 99207 ZZC PRENATAL VISIT: CPT | Performed by: OBSTETRICS & GYNECOLOGY

## 2018-01-12 PROCEDURE — 76805 OB US >/= 14 WKS SNGL FETUS: CPT

## 2018-01-12 NOTE — NURSING NOTE
"Chief Complaint   Patient presents with     Prenatal Care     20.2 weeks       Initial /67 (BP Location: Left arm, Cuff Size: Adult Regular)  Pulse 98  Wt 112 lb 6.4 oz (51 kg)  LMP 08/27/2017  SpO2 100%  BMI 19 kg/m2 Estimated body mass index is 19 kg/(m^2) as calculated from the following:    Height as of 1/26/17: 5' 4.5\" (1.638 m).    Weight as of this encounter: 112 lb 6.4 oz (51 kg).  Medication Reconciliation: constanza Alston MA 1/12/2018         "

## 2018-01-12 NOTE — PROGRESS NOTES
20w2d  Doing well without issues/concerns.  Routine anticipatory guidance. Dental work consent printed for patient. It was faxed to Dentist yesterday.  US results are pending . return to clinic in 4 weeks.    ICD-10-CM    1. Supervision of other normal pregnancy, antepartum,second trimester Z34.80      CEPHAS AGBEH, MD.

## 2018-01-12 NOTE — MR AVS SNAPSHOT
"              After Visit Summary   1/12/2018    Robert Anand    MRN: [id number]           Patient Information     Date Of Birth          1990        Visit Information        Provider Department      1/12/2018 1:45 PM Agbeh, Cephas Mawuena, MD; [employer] [facility]         Follow-ups after your visit        Your next 10 appointments already scheduled     Feb 09, 2018  1:00 PM CST   ESTABLISHED PRENATAL with Cephas Mawuena Agbeh, MD   [facility] ([facility]    [address]  [place] [address] [phone]              Who to contact     If you have questions or need follow up information about today's clinic visit or your schedule please contact [facility] directly at [phone].  Normal or non-critical lab and imaging results will be communicated to you by MyChart, letter or phone within 4 business days after the clinic has received the results. If you do not hear from us within 7 days, please contact the clinic through MyChart or phone. If you have a critical or abnormal lab result, we will notify you by phone as soon as possible.  Submit refill requests through [employer] or call your pharmacy and they will forward the refill request to us. Please allow 3 business days for your refill to be completed.          Additional Information About Your Visit        MyChart Information     [employer] lets you send messages to your doctor, view your test results, renew your prescriptions, schedule appointments and more. To sign up, go to [email or web address] . Click on \"Log in\" on the left side of the screen, which will take you to the Welcome page. Then click on \"Sign up Now\" on the right side of the page.     You will be asked to enter the access code listed below, as well as some personal information. Please follow the directions to create your username and password.     Your access code is: [email or web address]  Expires: 1/18/2018 " 11:47 AM     Your access code will  in 90 days. If you need help or a new code, please call your [facility] or [phone].        Care EveryWhere ID     This is your Care EveryWhere ID. This could be used by other organizations to access your [place] medical records  [id number]        Your Vitals Were     Pulse Last Period Pulse Oximetry BMI (Body Mass Index)          98 2017 100% 19 kg/m2         Blood Pressure from Last 3 Encounters:   18 114/67   17 114/72   17 115/71    Weight from Last 3 Encounters:   18 112 lb 6.4 oz (51 kg)   17 109 lb (49.4 kg)   17 105 lb 9.6 oz (47.9 kg)              Today, you had the following     No orders found for display       Primary Care Provider Office Phone # Fax #    [facility] [phone]       [address] [facility] [address]        Equal Access to Services     MURTAZA FOSTER : Hadii aad ku hadasho Soomaali, waaxda luqadaha, qaybta kaalmada adeegyada, waxay idiin hayaan joy matias . So [facility] [phone].    ATENCIÓN: Si habla español, tiene a ramos disposición servicios gratuitos de asistencia lingüística. Llame al [phone].    We comply with applicable federal civil rights laws and [place] laws. We do not discriminate on the basis of race, color, national origin, age, disability, sex, sexual orientation, or gender identity.            Thank you!     Thank you for choosing [facility]  for your care. Our goal is always to provide you with excellent care. Hearing back from our patients is one way we can continue to improve our services. Please take a few minutes to complete the written survey that you may receive in the mail after your visit with us. Thank you!             Your Updated Medication List - Protect others around you: Learn how to safely use, store and throw away your medicines at www.disposemymeds.org.          This list is accurate as of: 18   2:04 PM.  Always use your most recent med list.                   Brand Name Dispense Instructions for use Diagnosis    multivitamin CF formula chewable tablet    CHOICEFUL    90 tablet    Take 1 tablet by mouth daily    Supervision of other normal pregnancy, antepartum       ondansetron 8 MG ODT tab    ZOFRAN ODT    20 tablet    Take 1 tablet (8 mg) by mouth 3 times daily (before meals)    Hyperemesis gravidarum, Absence of menstruation       prenatal multivitamin plus iron 27-0.8 MG Tabs per tablet     100 tablet    Take 1 tablet by mouth daily    Hyperemesis gravidarum, Absence of menstruation

## 2018-02-09 ENCOUNTER — PRENATAL OFFICE VISIT (OUTPATIENT)
Dept: OBGYN | Facility: CLINIC | Age: 28
End: 2018-02-09
Payer: COMMERCIAL

## 2018-02-09 VITALS
DIASTOLIC BLOOD PRESSURE: 68 MMHG | HEART RATE: 87 BPM | WEIGHT: 113 LBS | BODY MASS INDEX: 19.1 KG/M2 | TEMPERATURE: 97.9 F | SYSTOLIC BLOOD PRESSURE: 105 MMHG

## 2018-02-09 DIAGNOSIS — Z34.80 SUPERVISION OF OTHER NORMAL PREGNANCY, ANTEPARTUM: Primary | ICD-10-CM

## 2018-02-09 LAB
ABO + RH BLD: NORMAL
ABO + RH BLD: NORMAL
ALBUMIN UR-MCNC: NEGATIVE MG/DL
APPEARANCE UR: CLEAR
BACTERIA #/AREA URNS HPF: ABNORMAL /HPF
BILIRUB UR QL STRIP: NEGATIVE
COLOR UR AUTO: YELLOW
GLUCOSE 1H P 50 G GLC PO SERPL-MCNC: 65 MG/DL (ref 60–129)
GLUCOSE UR STRIP-MCNC: NEGATIVE MG/DL
HGB BLD-MCNC: 11.2 G/DL (ref 11.7–15.7)
HGB UR QL STRIP: NEGATIVE
KETONES UR STRIP-MCNC: NEGATIVE MG/DL
LEUKOCYTE ESTERASE UR QL STRIP: NEGATIVE
NITRATE UR QL: NEGATIVE
PH UR STRIP: 6.5 PH (ref 5–7)
RBC #/AREA URNS AUTO: ABNORMAL /HPF
SOURCE: ABNORMAL
SP GR UR STRIP: 1.02 (ref 1–1.03)
SPECIMEN EXP DATE BLD: NORMAL
UROBILINOGEN UR STRIP-ACNC: 0.2 EU/DL (ref 0.2–1)
WBC #/AREA URNS AUTO: ABNORMAL /HPF

## 2018-02-09 PROCEDURE — 85018 HEMOGLOBIN: CPT | Performed by: OBSTETRICS & GYNECOLOGY

## 2018-02-09 PROCEDURE — 82950 GLUCOSE TEST: CPT | Performed by: OBSTETRICS & GYNECOLOGY

## 2018-02-09 PROCEDURE — 36415 COLL VENOUS BLD VENIPUNCTURE: CPT | Performed by: OBSTETRICS & GYNECOLOGY

## 2018-02-09 PROCEDURE — 86901 BLOOD TYPING SEROLOGIC RH(D): CPT | Performed by: OBSTETRICS & GYNECOLOGY

## 2018-02-09 PROCEDURE — 99207 ZZC PRENATAL VISIT: CPT | Performed by: OBSTETRICS & GYNECOLOGY

## 2018-02-09 PROCEDURE — 86900 BLOOD TYPING SEROLOGIC ABO: CPT | Performed by: OBSTETRICS & GYNECOLOGY

## 2018-02-09 PROCEDURE — 81001 URINALYSIS AUTO W/SCOPE: CPT | Performed by: OBSTETRICS & GYNECOLOGY

## 2018-02-09 RX ORDER — PRENATAL VIT/IRON FUM/FOLIC AC 27MG-0.8MG
1 TABLET ORAL DAILY
Qty: 100 TABLET | Refills: 3 | Status: SHIPPED | OUTPATIENT
Start: 2018-02-09 | End: 2018-10-02

## 2018-02-09 NOTE — PATIENT INSTRUCTIONS
If you have any questions regarding your visit, Please contact your care team.      Women s Health CLINIC HOURS TELEPHONE NUMBER   Cephas Agbeh, M.D. Lisa -      Susanna Churchill Monday-[place]  8:00a.m-4:45 p.m  Tuesday--[place]   8:00a.m-4:45 p.m.  Thursday-[place]  8:00a.m-4:45 p.m.  Friday-[facility]  [address] [place] [address]  [phone]    [id number]-[phone]    [facility]  [address] [address]  [phone]  [id number]-[phone]   Urgent Care locations:    [facility] Monday-Friday  5 pm - 9 pm  Saturday and Sunday   9 am - 5 pm    Monday-Friday   11 pm - 9 pm  Saturday and Sunday   9 am - 5 pm   [phone]     If you need a medication refill, please contact your pharmacy. Please allow 3 business days for your refill to be completed.  As always, Thank you for trusting us with your healthcare needs!

## 2018-02-09 NOTE — LETTER
[facility]  [address]  [place] [address]  Phone: [phone]    02/12/18    Robert Anand  [address]   [place] [address]      Dear Robert-    All of your labs were normal for you.   continue your prenatal vitamins and uiron     Please contact the clinic if you have additional questions.  Thank you.     Sincerely,      Cephas Mawuena Agbeh, MD

## 2018-02-09 NOTE — PROGRESS NOTES
24w2d  Complaints of abdominal pains and some pelvic pressure. Cervix is LTC. Encouraged increased fluid intake.  Routine anticipatory guidance.  US was normal.  RTC 4wk.  Glucola given.   .  Results for orders placed or performed in visit on 02/09/18   UA with Microscopic reflex to Culture   Result Value Ref Range    Color Urine Yellow     Appearance Urine Clear     Glucose Urine Negative NEG^Negative mg/dL    Bilirubin Urine Negative NEG^Negative    Ketones Urine Negative NEG^Negative mg/dL    Specific Gravity Urine 1.025 1.003 - 1.035    pH Urine 6.5 5.0 - 7.0 pH    Protein Albumin Urine Negative NEG^Negative mg/dL    Urobilinogen Urine 0.2 0.2 - 1.0 EU/dL    Nitrite Urine Negative NEG^Negative    Blood Urine Negative NEG^Negative    Leukocyte Esterase Urine Negative NEG^Negative    Source Midstream Urine     WBC Urine O - 2 OTO2^O - 2 /HPF    RBC Urine O - 2 OTO2^O - 2 /HPF    Bacteria Urine Few (A) NEG^Negative /HPF       ICD-10-CM    1. Supervision of other normal pregnancy, antepartum Z34.80 Glucose tolerance gest screen 1 hour     Hemoglobin     ABO and Rh     UA with Microscopic reflex to Culture     Prenatal Vit-Fe Fumarate-FA (PRENATAL MULTIVITAMIN PLUS IRON) 27-0.8 MG TABS per tablet     Glucose tolerance gest screen 1 hour     Hemoglobin     ABO and Rh     CEPHAS AGBEH, MD.

## 2018-02-09 NOTE — NURSING NOTE
"Chief Complaint   Patient presents with     Prenatal Care     OBV 24w2d       Initial /68 (BP Location: Left arm, Patient Position: Chair, Cuff Size: Adult Regular)  Pulse 87  Temp 97.9  F (36.6  C) (Oral)  Wt 113 lb (51.3 kg)  LMP 08/27/2017  Breastfeeding? No  BMI 19.1 kg/m2 Estimated body mass index is 19.1 kg/(m^2) as calculated from the following:    Height as of 1/26/17: 5' 4.5\" (1.638 m).    Weight as of this encounter: 113 lb (51.3 kg).  Medication Reconciliation: complete   Kinga Cesar CMA      "

## 2018-02-09 NOTE — MR AVS SNAPSHOT
After Visit Summary   2/9/2018    Robert Anand    MRN: [id number]           Patient Information     Date Of Birth          1990        Visit Information        Provider Department      2/9/2018 12:45 PM Agbeh, Cephas Mawuena, MD; [employer] [facility]        Care Instructions                                                        If you have any questions regarding your visit, Please contact your care team.      Women s Health CLINIC HOURS TELEPHONE NUMBER   Cephas Agbeh, M.D. Lisa -      Susanna Angel-ALEXANDRA Monday-[place]  8:00a.m-4:45 p.m  Tuesday--[place]   8:00a.m-4:45 p.m.  [facility]  8:00a.m-4:45 p.m.  Friday-[facility]  [address]  [place] [address]  [phone]    [phone]    [facility]  [address]  [place] [address]  [phone]  [id number]-[phone]   Urgent Care locations:    [facility] Monday-Friday  5 pm - 9 pm  Saturday and Sunday   9 am - 5 pm    Monday-Friday   11 pm - 9 pm  Saturday and Sunday   9 am - 5 pm   [phone]     If you need a medication refill, please contact your pharmacy. Please allow 3 business days for your refill to be completed.  As always, Thank you for trusting us with your healthcare needs!              Follow-ups after your visit        Your next 10 appointments already scheduled     Mar 09, 2018  8:30 AM CST   ESTABLISHED PRENATAL with Cephas Mawuena Agbeh, MD   [facility]    [address] [facility] [id number] [phone]              Who to contact     If you have questions or need follow up information about today's clinic visit or your schedule please contact [facility] directly at [phone].  Normal or non-critical lab and imaging results will be communicated to you by MyChart, letter or phone within 4 business days  "after the clinic has received the results. If you do not hear from us within 7 days, please contact the clinic through [employer] or phone. If you have a critical or abnormal lab result, we will notify you by phone as soon as possible.  Submit refill requests through [employer] or call your pharmacy and they will forward the refill request to us. Please allow 3 business days for your refill to be completed.          Additional Information About Your Visit        [employer] Information     [employer] lets you send messages to your doctor, view your test results, renew your prescriptions, schedule appointments and more. To sign up, go to [email or web address] . Click on \"Log in\" on the left side of the screen, which will take you to the Welcome page. Then click on \"Sign up Now\" on the right side of the page.     You will be asked to enter the access code listed below, as well as some personal information. Please follow the directions to create your username and password.     Your access code is: [email or web address]  Expires: 5/10/2018  1:11 PM     Your access code will  in 90 days. If you need help or a new code, please call your [place] clinic or [phone].        Care EveryWhere ID     This is your Care EveryWhere ID. This could be used by other organizations to access your [facility] medical records  [id number]        Your Vitals Were     Pulse Temperature Last Period Breastfeeding? BMI (Body Mass Index)       87 97.9  F (36.6  C) (Oral) 2017 No 19.1 kg/m2        Blood Pressure from Last 3 Encounters:   18 105/68   18 114/67   17 114/72    Weight from Last 3 Encounters:   18 113 lb (51.3 kg)   18 112 lb 6.4 oz (51 kg)   17 109 lb (49.4 kg)              Today, you had the following     No orders found for display       Primary Care Provider Office Phone # Fax #    [facility] [phone]       [address] [place] [address]        Equal Access to " Services     [facility]: Hadii yunior Diggs, waaxda luqadaha, qaybta kaalmazeus obrien, nanda matias . So [facility] [phone].    ATENCIÓN: Si habla espcharlie, tiene a ramos disposición servicios gratuitos de asistencia lingüística. Llame al [phone].    We comply with applicable federal civil rights laws and [place] laws. We do not discriminate on the basis of race, color, national origin, age, disability, sex, sexual orientation, or gender identity.            Thank you!     Thank you for choosing [facility]  for your care. Our goal is always to provide you with excellent care. Hearing back from our patients is one way we can continue to improve our services. Please take a few minutes to complete the written survey that you may receive in the mail after your visit with us. Thank you!             Your Updated Medication List - Protect others around you: Learn how to safely use, store and throw away your medicines at www.disposemymeds.org.          This list is accurate as of 2/9/18  1:11 PM.  Always use your most recent med list.                   Brand Name Dispense Instructions for use Diagnosis    multivitamin CF formula chewable tablet    CHOICEFUL    90 tablet    Take 1 tablet by mouth daily    Supervision of other normal pregnancy, antepartum       ondansetron 8 MG ODT tab    ZOFRAN ODT    20 tablet    Take 1 tablet (8 mg) by mouth 3 times daily (before meals)    Hyperemesis gravidarum, Absence of menstruation       prenatal multivitamin plus iron 27-0.8 MG Tabs per tablet     100 tablet    Take 1 tablet by mouth daily    Hyperemesis gravidarum, Absence of menstruation

## 2018-03-09 ENCOUNTER — PRENATAL OFFICE VISIT (OUTPATIENT)
Dept: OBGYN | Facility: CLINIC | Age: 28
End: 2018-03-09
Payer: COMMERCIAL

## 2018-03-09 VITALS
BODY MASS INDEX: 20.11 KG/M2 | HEART RATE: 91 BPM | WEIGHT: 119 LBS | SYSTOLIC BLOOD PRESSURE: 104 MMHG | DIASTOLIC BLOOD PRESSURE: 67 MMHG | TEMPERATURE: 97.7 F

## 2018-03-09 DIAGNOSIS — Z34.80 SUPERVISION OF OTHER NORMAL PREGNANCY, ANTEPARTUM: Primary | ICD-10-CM

## 2018-03-09 DIAGNOSIS — D50.8 IRON DEFICIENCY ANEMIA SECONDARY TO INADEQUATE DIETARY IRON INTAKE: ICD-10-CM

## 2018-03-09 PROCEDURE — 99207 ZZC PRENATAL VISIT: CPT | Performed by: OBSTETRICS & GYNECOLOGY

## 2018-03-09 RX ORDER — FERROUS SULFATE 325(65) MG
325 TABLET ORAL 2 TIMES DAILY
Qty: 60 TABLET | Refills: 3 | Status: SHIPPED | OUTPATIENT
Start: 2018-03-09 | End: 2018-10-02

## 2018-03-09 NOTE — PROGRESS NOTES
28w2d Tired.  No HA, visual changes, N/V etc.  Review GCT/Hgb.  RTC 2 wk/prn.    ICD-10-CM    1. Supervision of other normal pregnancy, antepartum Z34.80 ferrous sulfate (IRON) 325 (65 FE) MG tablet   2. Iron deficiency anemia secondary to inadequate dietary iron intake D50.8 ferrous sulfate (IRON) 325 (65 FE) MG tablet     CEPHAS AGBEH, MD.

## 2018-03-09 NOTE — NURSING NOTE
"Chief Complaint   Patient presents with     Prenatal Care       Initial /67 (BP Location: Left arm, Patient Position: Chair, Cuff Size: Adult Regular)  Pulse 91  Temp 97.7  F (36.5  C) (Oral)  Wt 119 lb (54 kg)  LMP 08/27/2017  Breastfeeding? No  BMI 20.11 kg/m2 Estimated body mass index is 20.11 kg/(m^2) as calculated from the following:    Height as of 1/26/17: 5' 4.5\" (1.638 m).    Weight as of this encounter: 119 lb (54 kg).  Medication Reconciliation: complete   Adolphi MARLYN Cesar      "

## 2018-03-09 NOTE — MR AVS SNAPSHOT
After Visit Summary   3/9/2018    Robert Anand    MRN: [id number]           Patient Information     Date Of Birth          1990        Visit Information        Provider Department      3/9/2018 8:15 AM Agbeh, Cephas Mawuena, MD; MULTILINGUAL WORD [facility]        Care Instructions                                                        If you have any questions regarding your visit, Please contact your care team.      Women s Health CLINIC HOURS TELEPHONE NUMBER   Cephas Agbeh, M.D. Lisa -      Susanna Angel-ALEXANDRA Monday-[place]  8:00a.m-4:45 p.m  Tuesday--[place]   8:00a.m-4:45 p.m.  [facility]  8:00a.m-4:45 p.m.  Friday-[facility]  [address]  [place] [address]  [phone]    [id number]-[phone]    [facility]  [address]  [place] [address]  [phone]  [id number]-[phone]   Urgent Care locations:    [facility] Monday-Friday  5 pm - 9 pm  Saturday and Sunday   9 am - 5 pm    Monday-Friday   11 pm - 9 pm  Saturday and Sunday   9 am - 5 pm   [phone]     If you need a medication refill, please contact your pharmacy. Please allow 3 business days for your refill to be completed.  As always, Thank you for trusting us with your healthcare needs!              Follow-ups after your visit        Your next 10 appointments already scheduled     Mar 30, 2018 10:00 AM CDT   ESTABLISHED PRENATAL with Cephas Mawuena Agbeh, MD   [facility] ([facility])    [address]  [place] [address]   [phone]            Apr 13, 2018 10:00 AM CDT   ESTABLISHED PRENATAL with Cephas Mawuena Agbeh, MD   [facility] ([facility])    [address]  [place] [address]   [phone]              Who to contact     If you have questions or need follow up information about today's clinic visit  "or your schedule please contact [facility] directly at [phone].  Normal or non-critical lab and imaging results will be communicated to you by MyChart, letter or phone within 4 business days after the clinic has received the results. If you do not hear from us within 7 days, please contact the clinic through MyChart or phone. If you have a critical or abnormal lab result, we will notify you by phone as soon as possible.  Submit refill requests through [employer] or call your pharmacy and they will forward the refill request to us. Please allow 3 business days for your refill to be completed.          Additional Information About Your Visit        [employer] Information     [employer] lets you send messages to your doctor, view your test results, renew your prescriptions, schedule appointments and more. To sign up, go to [email or web address] . Click on \"Log in\" on the left side of the screen, which will take you to the Welcome page. Then click on \"Sign up Now\" on the right side of the page.     You will be asked to enter the access code listed below, as well as some personal information. Please follow the directions to create your username and password.     Your access code is: [email or web address]  Expires: 5/10/2018  1:11 PM     Your access code will  in 90 days. If you need help or a new code, please call your [place] clinic or [phone].        Care EveryWhere ID     This is your Care EveryWhere ID. This could be used by other organizations to access your [place] medical records  [id number]        Your Vitals Were     Pulse Temperature Last Period Breastfeeding? BMI (Body Mass Index)       91 97.7  F (36.5  C) (Oral) 2017 No 20.11 kg/m2        Blood Pressure from Last 3 Encounters:   18 104/67   18 105/68   18 114/67    Weight from Last 3 Encounters:   18 119 lb (54 kg)   18 113 lb (51.3 kg)   18 112 lb 6.4 oz (51 kg)              Today, you had the " following     No orders found for display       Primary Care Provider Office Phone # Fax #    [facility] [phone] [address] [facility] [address]        Equal Access to Services     KT FOSTER : Hadii yunior ku hadadelinao Soomaali, waaxda luqadaha, qaybta kaalmada adeegyada, waxteodora liun joy hayden laAashishdangelo prieto. So [facility] [phone].    ATENCIÓN: Si habla español, tiene a ramos disposición servicios gratuitos de asistencia lingüística. Llame al [phone].    We comply with applicable federal civil rights laws and [place] laws. We do not discriminate on the basis of race, color, national origin, age, disability, sex, sexual orientation, or gender identity.            Thank you!     Thank you for choosing [facility]  for your care. Our goal is always to provide you with excellent care. Hearing back from our patients is one way we can continue to improve our services. Please take a few minutes to complete the written survey that you may receive in the mail after your visit with us. Thank you!             Your Updated Medication List - Protect others around you: Learn how to safely use, store and throw away your medicines at www.disposemymeds.org.          This list is accurate as of 3/9/18  8:35 AM.  Always use your most recent med list.                   Brand Name Dispense Instructions for use Diagnosis    multivitamin CF formula chewable tablet    CHOICEFUL    90 tablet    Take 1 tablet by mouth daily    Supervision of other normal pregnancy, antepartum       ondansetron 8 MG ODT tab    ZOFRAN ODT    20 tablet    Take 1 tablet (8 mg) by mouth 3 times daily (before meals)    Hyperemesis gravidarum, Absence of menstruation       prenatal multivitamin plus iron 27-0.8 MG Tabs per tablet     100 tablet    Take 1 tablet by mouth daily    Supervision of other normal pregnancy, antepartum

## 2018-03-09 NOTE — PATIENT INSTRUCTIONS
If you have any questions regarding your visit, Please contact your care team.      Women s Health CLINIC HOURS TELEPHONE NUMBER   Cephas Agbeh, M.D. Lisa -      Susanna Churchill Monday-[place]  8:00a.m-4:45 p.m  Tuesday--[place]   8:00a.m-4:45 p.m.  Thursday-[place]  8:00a.m-4:45 p.m.  Friday-[facility]  [address] [place] [address]  [phone]    [id number]-[phone]    [facility]  [address] [address]  [phone]  [id number]-[phone]   Urgent Care locations:    [facility] Monday-Friday  5 pm - 9 pm  Saturday and Sunday   9 am - 5 pm    Monday-Friday   11 pm - 9 pm  Saturday and Sunday   9 am - 5 pm   [phone]     If you need a medication refill, please contact your pharmacy. Please allow 3 business days for your refill to be completed.  As always, Thank you for trusting us with your healthcare needs!

## 2018-03-30 ENCOUNTER — PRENATAL OFFICE VISIT (OUTPATIENT)
Dept: OBGYN | Facility: CLINIC | Age: 28
End: 2018-03-30
Payer: COMMERCIAL

## 2018-03-30 VITALS
HEART RATE: 82 BPM | BODY MASS INDEX: 19.94 KG/M2 | DIASTOLIC BLOOD PRESSURE: 65 MMHG | WEIGHT: 118 LBS | SYSTOLIC BLOOD PRESSURE: 100 MMHG | TEMPERATURE: 98.9 F

## 2018-03-30 DIAGNOSIS — Z34.80 SUPERVISION OF OTHER NORMAL PREGNANCY, ANTEPARTUM: Primary | ICD-10-CM

## 2018-03-30 DIAGNOSIS — R62.51 POOR WEIGHT GAIN (0-17): ICD-10-CM

## 2018-03-30 DIAGNOSIS — R35.0 URINARY FREQUENCY: ICD-10-CM

## 2018-03-30 LAB
ALBUMIN UR-MCNC: 30 MG/DL
APPEARANCE UR: CLEAR
BACTERIA #/AREA URNS HPF: ABNORMAL /HPF
BILIRUB UR QL STRIP: NEGATIVE
COLOR UR AUTO: YELLOW
GLUCOSE UR STRIP-MCNC: NEGATIVE MG/DL
HGB UR QL STRIP: NEGATIVE
KETONES UR STRIP-MCNC: 40 MG/DL
LEUKOCYTE ESTERASE UR QL STRIP: NEGATIVE
NITRATE UR QL: NEGATIVE
NON-SQ EPI CELLS #/AREA URNS LPF: ABNORMAL /LPF
OVAL FAT BODIES #/AREA URNS HPF: ABNORMAL /HPF
PH UR STRIP: 7 PH (ref 5–7)
RBC #/AREA URNS AUTO: ABNORMAL /HPF
SOURCE: ABNORMAL
SP GR UR STRIP: 1.02 (ref 1–1.03)
UROBILINOGEN UR STRIP-ACNC: 0.2 EU/DL (ref 0.2–1)
WBC #/AREA URNS AUTO: ABNORMAL /HPF

## 2018-03-30 PROCEDURE — 81001 URINALYSIS AUTO W/SCOPE: CPT | Performed by: OBSTETRICS & GYNECOLOGY

## 2018-03-30 PROCEDURE — 99207 ZZC PRENATAL VISIT: CPT | Performed by: OBSTETRICS & GYNECOLOGY

## 2018-03-30 RX ORDER — NITROFURANTOIN 25; 75 MG/1; MG/1
100 CAPSULE ORAL 2 TIMES DAILY
Qty: 14 CAPSULE | Refills: 0 | Status: SHIPPED | OUTPATIENT
Start: 2018-03-30 | End: 2018-04-12

## 2018-03-30 NOTE — PATIENT INSTRUCTIONS
If you have any questions regarding your visit, Please contact your care team.      Women s Health CLINIC HOURS TELEPHONE NUMBER   Cephas Agbeh, M.D. Lisa -      Susanna     Monday-[place]  8:00a.m-4:45 p.m  Tuesday--[place]   8:00a.m-4:45 p.m.  Thursday-[place]  8:00a.m-4:45 p.m.  Friday-[facility]  [address]  [place] [address]  [phone]    [id number]-[phone]    [facility]  [address] [address]  [phone]  [id number]-[phone]   Urgent Care locations:    [facility] Monday-Friday  5 pm - 9 pm  Saturday and Sunday   9 am - 5 pm    Monday-Friday   11 pm - 9 pm  Saturday and Sunday   9 am - 5 pm   [phone]     If you need a medication refill, please contact your pharmacy. Please allow 3 business days for your refill to be completed.  As always, Thank you for trusting us with your healthcare needs!

## 2018-03-30 NOTE — PROGRESS NOTES
31w2d.  Tired.  No HA, visual changes, N/V etc. No appetite x 2 days. Complaints of urinary frequency. Poor weight gain and lagging fundal height. She says she is eating well. Estonian speaking  present for visit. Ultrasound for growth. Abx for UTI. Encouraged increased fluid intake. RTC 2 wk/prn.  Results for orders placed or performed in visit on 03/30/18   UA with Microscopic reflex to Culture   Result Value Ref Range    Color Urine Yellow     Appearance Urine Clear     Glucose Urine Negative NEG^Negative mg/dL    Bilirubin Urine Negative NEG^Negative    Ketones Urine 40 (A) NEG^Negative mg/dL    Specific Gravity Urine 1.020 1.003 - 1.035    pH Urine 7.0 5.0 - 7.0 pH    Protein Albumin Urine 30 (A) NEG^Negative mg/dL    Urobilinogen Urine 0.2 0.2 - 1.0 EU/dL    Nitrite Urine Negative NEG^Negative    Blood Urine Negative NEG^Negative    Leukocyte Esterase Urine Negative NEG^Negative    Source Midstream Urine     WBC Urine 0 - 5 OTO5^0 - 5 /HPF    RBC Urine O - 2 OTO2^O - 2 /HPF    Squamous Epithelial /LPF Urine Few FEW^Few /LPF    Bacteria Urine Few (A) NEG^Negative /HPF    Fat Globules Few (A) NEG^Negative /HPF       ICD-10-CM    1. Supervision of other normal pregnancy, antepartum Z34.80 UA with Microscopic reflex to Culture     US OB Ltd One Or More Fetus FU/Repeat     nitroFURantoin, macrocrystal-monohydrate, (MACROBID) 100 MG capsule   2. Urinary frequency R35.0 UA with Microscopic reflex to Culture     US OB Ltd One Or More Fetus FU/Repeat     nitroFURantoin, macrocrystal-monohydrate, (MACROBID) 100 MG capsule   3. Poor weight gain (0-17) R62.51 UA with Microscopic reflex to Culture     US OB Ltd One Or More Fetus FU/Repeat     CEPHAS AGBEH, MD.

## 2018-03-30 NOTE — MR AVS SNAPSHOT
After Visit Summary   3/30/2018    Robert Anand    MRN: [id number]           Patient Information     Date Of Birth          1990        Visit Information        Provider Department      3/30/2018 9:45 AM Agbeh, Cephas Mawuena, MD; MULTILINGUAL WORD [facility]        Today's Diagnoses     Supervision of other normal pregnancy, antepartum    -  1    Urinary frequency        Poor weight gain (0-17)          Care Instructions                                                        If you have any questions regarding your visit, Please contact your care team.      Women s Health CLINIC HOURS TELEPHONE NUMBER   Cephas Agbeh, M.D. Lisa -      [facility]  8:00[facility]-4:45 p.m  Tuesday--[place]   8:00a.m-4:45 p.m.  [facility]  8:00a.m-4:45 p.m.  Friday-[facility]  [address]  [place] [address]  [phone]    [phone]    [facility]  [address] [address]  [phone]  [id number]-[phone]   Urgent Care locations:    [facility] Monday-Friday  5 pm - 9 pm  Saturday and Sunday   9 am - 5 pm    Monday-Friday   11 pm - 9 pm  Saturday and Sunday   9 am - 5 pm   [phone]     If you need a medication refill, please contact your pharmacy. Please allow 3 business days for your refill to be completed.  As always, Thank you for trusting us with your healthcare needs!              Follow-ups after your visit        Your next 10 appointments already scheduled     Apr 02, 2018  2:15 PM CDT   US OB SINGLE FOLLOW UP REPEAT with BEUS1   [facility]    [address] [address]   [phone]           Please bring a list of your medicines (including vitamins, minerals and over-the-counter drugs). Also, tell your doctor about any allergies you may have. Wear comfortable clothes and leave  your valuables at home.  If you re less than 20 weeks drink four 8-ounce glasses of fluid an hour before your exam. If you need to empty your bladder before your exam, try to release only a little urine. Then, drink another glass of fluid.  You may have up to two family members in the exam room. If you bring a small child, an adult must be there to care for him or her.  Please call the Imaging Department at your exam site with any questions.            Apr 12, 2018  2:00 PM CDT   ESTABLISHED PRENATAL with Cephas Mawuena Agbeh, MD   [facility]    [address] [address]   [phone]            Apr 26, 2018  2:00 PM CDT   ESTABLISHED PRENATAL with Cephas Mawuena Agbeh, MD   [facility]    [address] [address]   [phone]              Future tests that were ordered for you today     Open Future Orders        Priority Expected Expires Ordered    US OB Ltd One Or More Fetus FU/Repeat Routine  3/30/2019 3/30/2018            Who to contact     If you have questions or need follow up information about today's clinic visit or your schedule please contact [facility] directly at [phone].  Normal or non-critical lab and imaging results will be communicated to you by [employer], letter or phone within 4 business days after the clinic has received the results. If you do not hear from us within 7 days, please contact the clinic through [employer] or phone. If you have a critical or abnormal lab result, we will notify you by phone as soon as possible.  Submit refill requests through [employer] or call your pharmacy and they will forward the refill request to us. Please allow 3 business days for your refill to be completed.          Additional Information About Your Visit        [employer] Information     [employer] lets you send messages to your doctor, view your test results, renew your  "prescriptions, schedule appointments and more. To sign up, go to [email or web address] . Click on \"Log in\" on the left side of the screen, which will take you to the Welcome page. Then click on \"Sign up Now\" on the right side of the page.     You will be asked to enter the access code listed below, as well as some personal information. Please follow the directions to create your username and password.     Your access code is: [email or web address]  Expires: 5/10/2018  2:11 PM     Your access code will  in 90 days. If you need help or a new code, please call your [place] clinic or [phone].        Care EveryWhere ID     This is your Care EveryWhere ID. This could be used by other organizations to access your [place] medical records  [id number]        Your Vitals Were     Pulse Temperature Last Period Breastfeeding? BMI (Body Mass Index)       82 98.9  F (37.2  C) (Oral) 2017 No 19.94 kg/m2        Blood Pressure from Last 3 Encounters:   18 100/65   18 104/67   18 105/68    Weight from Last 3 Encounters:   18 118 lb (53.5 kg)   18 119 lb (54 kg)   18 113 lb (51.3 kg)              We Performed the Following     UA with Microscopic reflex to Culture        Primary Care Provider Office Phone # Fax #    [facility] [phone] [address] [address]        Equal Access to Services     KT FOSTER : Hadii yunior ku hadasho Soomaali, waaxda luqadaha, qaybta kaalmada adesumanyazeus, nanda matias . So [facility] [phone].    ATENCIÓN: Si habla español, tiene a ramos disposición servicios gratuitos de asistencia lingüística. Llame al [phone].    We comply with applicable federal civil rights laws and [place] laws. We do not discriminate on the basis of race, color, national origin, age, disability, sex, sexual orientation, or gender identity.            Thank you!     Thank you for choosing [facility]  for your care. Our goal is always to provide you with excellent care. Hearing back from our patients is one way we can continue to improve our services. Please take a few minutes to complete the written survey that you may receive in the mail after your visit with us. Thank you!             Your Updated Medication List - Protect others around you: Learn how to safely use, store and throw away your medicines at www.disposemymeds.org.          This list is accurate as of 3/30/18 10:41 AM.  Always use your most recent med list.                   Brand Name Dispense Instructions for use Diagnosis    ferrous sulfate 325 (65 FE) MG tablet    IRON    60 tablet    Take 1 tablet (325 mg) by mouth 2 times daily    Iron deficiency anemia secondary to inadequate dietary iron intake, Supervision of other normal pregnancy, antepartum       multivitamin CF formula chewable tablet    CHOICEFUL    90 tablet    Take 1 tablet by mouth daily    Supervision of other normal pregnancy, antepartum       ondansetron 8 MG ODT tab    ZOFRAN ODT    20 tablet    Take 1 tablet (8 mg) by mouth 3 times daily (before meals)    Hyperemesis gravidarum, Absence of menstruation       prenatal multivitamin plus iron 27-0.8 MG Tabs per tablet     100 tablet    Take 1 tablet by mouth daily    Supervision of other normal pregnancy, antepartum

## 2018-04-02 ENCOUNTER — RADIANT APPOINTMENT (OUTPATIENT)
Dept: ULTRASOUND IMAGING | Facility: CLINIC | Age: 28
End: 2018-04-02
Attending: OBSTETRICS & GYNECOLOGY
Payer: COMMERCIAL

## 2018-04-02 DIAGNOSIS — Z34.80 SUPERVISION OF OTHER NORMAL PREGNANCY, ANTEPARTUM: ICD-10-CM

## 2018-04-02 DIAGNOSIS — R35.0 URINARY FREQUENCY: ICD-10-CM

## 2018-04-02 DIAGNOSIS — R62.51 POOR WEIGHT GAIN (0-17): ICD-10-CM

## 2018-04-02 PROCEDURE — 76816 OB US FOLLOW-UP PER FETUS: CPT

## 2018-04-12 ENCOUNTER — PRENATAL OFFICE VISIT (OUTPATIENT)
Dept: OBGYN | Facility: CLINIC | Age: 28
End: 2018-04-12
Payer: COMMERCIAL

## 2018-04-12 VITALS
OXYGEN SATURATION: 100 % | WEIGHT: 126 LBS | SYSTOLIC BLOOD PRESSURE: 111 MMHG | BODY MASS INDEX: 21.29 KG/M2 | DIASTOLIC BLOOD PRESSURE: 71 MMHG | HEART RATE: 88 BPM

## 2018-04-12 DIAGNOSIS — Z34.80 SUPERVISION OF OTHER NORMAL PREGNANCY, ANTEPARTUM: Primary | ICD-10-CM

## 2018-04-12 PROCEDURE — 99207 ZZC PRENATAL VISIT: CPT | Performed by: OBSTETRICS & GYNECOLOGY

## 2018-04-12 NOTE — PROGRESS NOTES
33w1d  Tired.  No HA, visual changes, N/V etc. PNE classes planned/done. Discussed ultrasound results. RTC 2 wk/prn.  Results for orders placed or performed in visit on 04/02/18   US OB Ltd One Or More Fetus FU/Repeat    Narrative    ULTRASOUND OBSTETRIC SINGLE FOLLOWUP/REPEAT  4/2/2018 3:24 PM    HISTORY: Check growth. Supervision of other normal pregnancy,  antepartum. Urinary frequency. Poor weight gain (0-17).    COMPARISON: OB survey 1/12/2018.    FINDINGS:   Presentation: Cephalic.  Cardiac activity: 150 bpm. Regular rhythm.  Movement: Unremarkable.  Placenta: Posterior. No evidence for placenta previa.  Adnexa: Unremarkable.   Cervical length: Not well visualized.  Amniotic fluid: Unremarkable. WESLEY: 11.5 cm.    Other findings: None.  A complete anatomy scan was not performed.     Measured parameters:       BPD:  7.5 cm      Age: 30 weeks 2 days.       HC:    28.1 cm      Age: 30 weeks 6 days.       AC:  26.0 cm      Age: 30 weeks 2 days.       FL:   5.9 cm      Age: 31 weeks 1 day.    Gestational age by current ultrasound measurement: 30 weeks 5 days,  corresponding to an RUTHANN of 6/6/2018.    Gestational age based on the reported previously established due date:  31 weeks 5 days, corresponding to an RUTHANN of 5/30/2018.    Estimated fetal weight: 1593 grams, corresponding to the 11th  percentile based on the reported previously established due date.       Impression    IMPRESSION:    1. Single live intrauterine pregnancy of 30 weeks 5 days gestation by  current ultrasound measurement. Fetal growth is 7 days less advanced  than what is expected from the reported previously established due  date.  2. Biparietal diameter and head circumference percentiles are 7 and 4,  respectively. Previously these were at the 66th and 54th percentile.  Follow up as clinically warranted.    WHITNEY FIELDS MD       ICD-10-CM    1. Supervision of other normal pregnancy, antepartum Z34.80      CEPHAS AGBEH, MD.

## 2018-04-12 NOTE — MR AVS SNAPSHOT
"              After Visit Summary   4/12/2018    Robert Anand    MRN: [id number]           Patient Information     Date Of Birth          1990        Visit Information        Provider Department      4/12/2018 1:45 PM Agbeh, Cephas Mawuena, MD; MULTILINGUAL WORD [facility] [place]         Follow-ups after your visit        Your next 10 appointments already scheduled     Apr 26, 2018  1:45 PM CDT   ESTABLISHED PRENATAL with Cephas Mawuena Agbeh, MD   [facility] ([facility]    [address]  [place] [address] [phone]              Who to contact     If you have questions or need follow up information about today's clinic visit or your schedule please contact [facility] directly at [phone].  Normal or non-critical lab and imaging results will be communicated to you by MyChart, letter or phone within 4 business days after the clinic has received the results. If you do not hear from us within 7 days, please contact the clinic through MyChart or phone. If you have a critical or abnormal lab result, we will notify you by phone as soon as possible.  Submit refill requests through MyCarGossip or call your pharmacy and they will forward the refill request to us. Please allow 3 business days for your refill to be completed.          Additional Information About Your Visit        MyChart Information     MyCarGossip lets you send messages to your doctor, view your test results, renew your prescriptions, schedule appointments and more. To sign up, go to [email or web address] . Click on \"Log in\" on the left side of the screen, which will take you to the Welcome page. Then click on \"Sign up Now\" on the right side of the page.     You will be asked to enter the access code listed below, as well as some personal information. Please follow the directions to create your username and password.     Your access code is: [email or web address]  Expires: 5/10/2018  2:11 PM   "   Your access code will  in 90 days. If you need help or a new code, please call your [place] clinic or [phone].        Care EveryWhere ID     This is your Care EveryWhere ID. This could be used by other organizations to access your [place] medical records  [id number]        Your Vitals Were     Pulse Last Period Pulse Oximetry BMI (Body Mass Index)          88 2017 100% 21.29 kg/m2         Blood Pressure from Last 3 Encounters:   18 111/71   18 100/65   18 104/67    Weight from Last 3 Encounters:   18 126 lb (57.2 kg)   18 118 lb (53.5 kg)   18 119 lb (54 kg)              Today, you had the following     No orders found for display       Primary Care Provider Office Phone # Fax #    [facility] [phone]       [address] [facility] [address]        Equal Access to Services     KT FOSTER : Hadii aad ku hadasho Soomaali, waaxda luqadaha, qaybta kaalmada adeegyada, waxay idiin hayaan adeeg kharash la'jojon . So [facility] [phone].    ATENCIÓN: Si habla español, tiene a ramos disposición servicios gratuitos de asistencia lingüística. Llame al [phone].    We comply with applicable federal civil rights laws and [place] laws. We do not discriminate on the basis of race, color, national origin, age, disability, sex, sexual orientation, or gender identity.            Thank you!     Thank you for choosing [facility]  for your care. Our goal is always to provide you with excellent care. Hearing back from our patients is one way we can continue to improve our services. Please take a few minutes to complete the written survey that you may receive in the mail after your visit with us. Thank you!             Your Updated Medication List - Protect others around you: Learn how to safely use, store and throw away your medicines at www.disposemymeds.org.          This list is accurate as of 18  2:10 PM.  Always use your  most recent med list.                   Brand Name Dispense Instructions for use Diagnosis    ferrous sulfate 325 (65 Fe) MG tablet    IRON    60 tablet    Take 1 tablet (325 mg) by mouth 2 times daily    Iron deficiency anemia secondary to inadequate dietary iron intake, Supervision of other normal pregnancy, antepartum       multivitamin CF formula chewable tablet    CHOICEFUL    90 tablet    Take 1 tablet by mouth daily    Supervision of other normal pregnancy, antepartum       prenatal multivitamin plus iron 27-0.8 MG Tabs per tablet     100 tablet    Take 1 tablet by mouth daily    Supervision of other normal pregnancy, antepartum

## 2018-04-12 NOTE — NURSING NOTE
"Chief Complaint   Patient presents with     Prenatal Care     33.1 weeks       Initial /71 (BP Location: Left arm, Cuff Size: Adult Regular)  Pulse 88  Wt 126 lb (57.2 kg)  LMP 08/27/2017  SpO2 100%  BMI 21.29 kg/m2 Estimated body mass index is 21.29 kg/(m^2) as calculated from the following:    Height as of 1/26/17: 5' 4.5\" (1.638 m).    Weight as of this encounter: 126 lb (57.2 kg).  Medication Reconciliation: constanza Alston MA 4/12/2018         "

## 2018-04-24 ENCOUNTER — MEDICAL CORRESPONDENCE (OUTPATIENT)
Dept: HEALTH INFORMATION MANAGEMENT | Facility: CLINIC | Age: 28
End: 2018-04-24

## 2018-04-26 ENCOUNTER — PRENATAL OFFICE VISIT (OUTPATIENT)
Dept: OBGYN | Facility: CLINIC | Age: 28
End: 2018-04-26
Payer: COMMERCIAL

## 2018-04-26 VITALS
WEIGHT: 130.8 LBS | BODY MASS INDEX: 22.11 KG/M2 | DIASTOLIC BLOOD PRESSURE: 68 MMHG | SYSTOLIC BLOOD PRESSURE: 113 MMHG | HEART RATE: 88 BPM | TEMPERATURE: 97.9 F | RESPIRATION RATE: 97 BRPM

## 2018-04-26 DIAGNOSIS — Z34.80 SUPERVISION OF OTHER NORMAL PREGNANCY, ANTEPARTUM: Primary | ICD-10-CM

## 2018-04-26 PROCEDURE — 99207 ZZC PRENATAL VISIT: CPT | Performed by: OBSTETRICS & GYNECOLOGY

## 2018-04-26 PROCEDURE — 87653 STREP B DNA AMP PROBE: CPT | Performed by: OBSTETRICS & GYNECOLOGY

## 2018-04-26 NOTE — PROGRESS NOTES
35w1d  No HA, visual changes, N/V etc.  Labor plan and warning s/s discussed. Routine AG. See flowsheet. RTC 1 wk/prn.  GBS done.    ICD-10-CM    1. Supervision of other normal pregnancy, antepartum Z34.80 Group B strep PCR     CEPHAS AGBEH, MD.  .

## 2018-04-26 NOTE — LETTER
[facility]  [address]  [place] [address]  [phone]  2018    RE: Robert Anand,  1990    To  Department of State, [place] of Consular Affairs:    Robert Anand is currently under our care for a confirmed pregnancy. Her estimated due date is 2018.     Robert Anand would benefit from the personal and  assistance of her mother, GARRY ARREDONDO ( 1772), in the immediate postpartum period. Her mother desires a visa to travel from [place] to the [facility] in early MAY, 2018 for this purpose. Please take this information into consideration.    I hope this information is sufficient for your needs.  If you have any additional questions regarding this matter please contact our office.  Thank you.    Sincerely,        CEPHAS AGBEH, MD.

## 2018-04-26 NOTE — PATIENT INSTRUCTIONS
If you have any questions regarding your visit, Please contact your care team.    Women s Health CLINIC HOURS TELEPHONE NUMBER   Cephas Agbeh, M.D.    Jeanne [place] -         Monday-[facility]  8:00 am - 5 pm  Tuesday- [facility]  8:00am- 5 pm  Wednesday- Off  Thursday- [facility]  8:00 am- 5 pm  Friday-[place]  8:00 am 5 pm [facility]  [address] [place] [address]  [phone] ask for [facility]    Imaging [phone]    [facility]  [address]  [place] [address] [phone]  Imaging [phone]     Urgent Care locations:    [facility] Saturday and Sunday   9 am - 5 pm    Monday-Friday   12 pm - 8 pm  Saturday and Sunday   9 am - 5 pm   [phone]       If you need a medication refill, please contact your pharmacy. Please allow 3 business days for your refill to be completed.  As always, Thank you for trusting us with your healthcare needs!

## 2018-04-26 NOTE — MR AVS SNAPSHOT
After Visit Summary   4/26/2018    Robert Anand    MRN: [id number]           Patient Information     Date Of Birth          1990        Visit Information        Provider Department      4/26/2018 1:45 PM Agbeh, Cephas Mawuena, MD; [facility]        Today's Diagnoses     Supervision of other normal pregnancy, antepartum    -  1      Care Instructions              If you have any questions regarding your visit, Please contact your care team.    Women s Health CLINIC HOURS TELEPHONE NUMBER   Cephas Agbeh, M.D.    Jeanne - ROSALVA Lewis - DAVIAN [place] -         Monday-[facility]  8:00 am - 5 pm  Tuesday- [facility]  8:00am- 5 pm  Wednesday- Off  Thursday- [facility]  8:00 am- 5 pm  Friday-[place]  8:00 am 5 pm [facility]  [address] [place] [address]  [phone] ask for [facility]    Imaging [phone]    [facility]  [address]  [place] [address]  [phone]  Imaging [phone]     Urgent Care locations:    [facility] Saturday and Sunday   9 am - 5 pm    Monday-Friday   12 pm - 8 pm  Saturday and Sunday   9 am - 5 pm   [phone]       If you need a medication refill, please contact your pharmacy. Please allow 3 business days for your refill to be completed.  As always, Thank you for trusting us with your healthcare needs!                Follow-ups after your visit        Your next 10 appointments already scheduled     May 07, 2018  2:00 PM CDT   ESTABLISHED PRENATAL with Cephas Mawuena Agbeh, MD   [facility]    [address]  [place] [address] [phone]              Who to contact     If you have questions or need follow up information about today's clinic visit or your schedule please contact [facility] directly at [phone].  Normal or  "non-critical lab and imaging results will be communicated to you by MyChart, letter or phone within 4 business days after the clinic has received the results. If you do not hear from us within 7 days, please contact the clinic through Switchflyt or phone. If you have a critical or abnormal lab result, we will notify you by phone as soon as possible.  Submit refill requests through Regen or call your pharmacy and they will forward the refill request to us. Please allow 3 business days for your refill to be completed.          Additional Information About Your Visit        Regen Information     Regen lets you send messages to your doctor, view your test results, renew your prescriptions, schedule appointments and more. To sign up, go to [email or web address] . Click on \"Log in\" on the left side of the screen, which will take you to the Welcome page. Then click on \"Sign up Now\" on the right side of the page.     You will be asked to enter the access code listed below, as well as some personal information. Please follow the directions to create your username and password.     Your access code is: [email or web address]  Expires: 5/10/2018  2:11 PM     Your access code will  in 90 days. If you need help or a new code, please call your [place] clinic or [phone].        Care EveryWhere ID     This is your Care EveryWhere ID. This could be used by other organizations to access your [place] medical records  [id number]        Your Vitals Were     Pulse Temperature Respirations Last Period BMI (Body Mass Index)       88 97.9  F (36.6  C) (Oral) 97 2017 22.11 kg/m2        Blood Pressure from Last 3 Encounters:   18 113/68   18 111/71   18 100/65    Weight from Last 3 Encounters:   18 130 lb 12.8 oz (59.3 kg)   18 126 lb (57.2 kg)   18 118 lb (53.5 kg)              We Performed the Following     Group B strep PCR        Primary Care Provider Office Phone # Fax #    [facility] [phone]       [address] [facility] [address]        Equal Access to Services     KT FOSTER : Hadii aad ku hadliliya Sojm, wajohnsonda luqadaha, qaybta kaalmada camille, nanda villanuevadangelo prieto. So [facility] [phone].    ATENCIÓN: Si habla español, tiene a ramos disposición servicios gratuitos de asistencia lingüística. Llame al [phone].    We comply with applicable federal civil rights laws and [place] laws. We do not discriminate on the basis of race, color, national origin, age, disability, sex, sexual orientation, or gender identity.            Thank you!     Thank you for choosing [facility]  for your care. Our goal is always to provide you with excellent care. Hearing back from our patients is one way we can continue to improve our services. Please take a few minutes to complete the written survey that you may receive in the mail after your visit with us. Thank you!             Your Updated Medication List - Protect others around you: Learn how to safely use, store and throw away your medicines at www.disposemymeds.org.          This list is accurate as of 4/26/18  2:32 PM.  Always use your most recent med list.                   Brand Name Dispense Instructions for use Diagnosis    ferrous sulfate 325 (65 Fe) MG tablet    IRON    60 tablet    Take 1 tablet (325 mg) by mouth 2 times daily    Iron deficiency anemia secondary to inadequate dietary iron intake, Supervision of other normal pregnancy, antepartum       multivitamin CF formula chewable tablet    CHOICEFUL    90 tablet    Take 1 tablet by mouth daily    Supervision of other normal pregnancy, antepartum       prenatal multivitamin plus iron 27-0.8 MG Tabs per tablet     100 tablet    Take 1 tablet by mouth daily    Supervision of other normal pregnancy, antepartum

## 2018-04-26 NOTE — NURSING NOTE
"Chief Complaint   Patient presents with     Prenatal Care     35w1d       Initial /68  Pulse 88  Temp 97.9  F (36.6  C) (Oral)  Resp (!) 97  Wt 130 lb 12.8 oz (59.3 kg)  LMP 08/27/2017  BMI 22.11 kg/m2 Estimated body mass index is 22.11 kg/(m^2) as calculated from the following:    Height as of 1/26/17: 5' 4.5\" (1.638 m).    Weight as of this encounter: 130 lb 12.8 oz (59.3 kg).  Medication Reconciliation: complete   Tasha An CMA      "

## 2018-04-28 LAB
GP B STREP DNA SPEC QL NAA+PROBE: NEGATIVE
SPECIMEN SOURCE: NORMAL

## 2018-05-04 NOTE — PATIENT INSTRUCTIONS
If you have any questions regarding your visit, Please contact your care team.    [facility] Access Services: [phone]      [facility] CLINIC HOURS TELEPHONE NUMBER   Cephas Agbeh, M.D.    MARLYN Harrell,     DAVIAN Lewis, DAVIAN             Monday-[place]    8:00a.m-4:45 p.m    Tuesday--[place]     8:00a.m-4:45 p.m.    Thursday-[place]    8:00a.m-4:45 p.m.    Friday-[place]    8:00a.m-4:45 p.m    [facility]   [address]   [place] [address] [phone]-Ask for [facility]   Fax [phone]   [phone]     [facility]   [address] [place] [address] Dr.   [place], [place] [address] [phone]     [facility]   [address] [address] [phone]   Oucwsth-[phone]    Urgent Care locations:    [facility]  Monday-Friday   5 pm - 9 pm   Saturday and Sunday    9 am - 5 pm      Monday-Friday    11 pm - 9 pm  Saturday and Sunday   9 am - 5 pm    [phone]     If you need a medication refill, please contact your pharmacy. Please allow 3 business days for your refill to be completed.  As always, Thank you for trusting us with your healthcare needs!

## 2018-05-07 ENCOUNTER — PRENATAL OFFICE VISIT (OUTPATIENT)
Dept: OBGYN | Facility: CLINIC | Age: 28
End: 2018-05-07
Payer: COMMERCIAL

## 2018-05-07 VITALS
HEART RATE: 89 BPM | BODY MASS INDEX: 22.78 KG/M2 | SYSTOLIC BLOOD PRESSURE: 114 MMHG | WEIGHT: 134.8 LBS | TEMPERATURE: 98.6 F | DIASTOLIC BLOOD PRESSURE: 77 MMHG

## 2018-05-07 DIAGNOSIS — Z34.80 SUPERVISION OF OTHER NORMAL PREGNANCY, ANTEPARTUM: Primary | ICD-10-CM

## 2018-05-07 PROCEDURE — 99207 ZZC PRENATAL VISIT: CPT | Performed by: OBSTETRICS & GYNECOLOGY

## 2018-05-07 NOTE — MR AVS SNAPSHOT
After Visit Summary   5/7/2018    Robert Anand    MRN: [id number]           Patient Information     Date Of Birth          1990        Visit Information        Provider Department      5/7/2018 1:45 PM Agbeh, Cephas Mawuena, MD; MULTILINGUAL WORD [facility]        Today's Diagnoses     Supervision of other normal pregnancy, antepartum    -  1      Care Instructions                                                        If you have any questions regarding your visit, Please contact your care team.    [facility] Access Services: [phone]      Women s Health CLINIC HOURS TELEPHONE NUMBER   Cephas Agbeh, M.D.    MARLYN Harrell,     DAVIAN Lewis, DAVIAN             Monday-[place]    8:00a.m-4:45 p.m    Tuesday--[place]     8:00a.m-4:45 p.m.    Thursday-[place]    8:00a.m-4:45 p.m.    Friday-[place]    8:00a.m-4:45 p.m    [facility]   [address] [place] [address]   [phone]-Ask for [facility]   Fax [phone]   Osdcohd-[phone]     [facility]   [address] [place] [address] Dr.   [place], [place] [address] [phone]     [facility]   [address] [address] [phone]   [phone]    Urgent Care locations:    [facility]  Monday-Friday   5 pm - 9 pm   Saturday and Sunday    9 am - 5 pm      Monday-Friday    11 pm - 9 pm  Saturday and Sunday   9 am - 5 pm    [phone]     If you need a medication refill, please contact your pharmacy. Please allow 3 business days for your refill to be completed.  As always, Thank you for trusting us with your healthcare needs!            Follow-ups after your visit        Your next 10 appointments already scheduled     May 16, 2018  2:20 PM CDT   US OB SINGLE FOLLOW UP REPEAT with [id number]   [facility] [place] ([facility])    [address] [place] [address]    [phone]           Please bring a list of your medicines (including vitamins, minerals and over-the-counter drugs). Also, tell your doctor about any allergies you may have. Wear comfortable clothes and leave your valuables at home.  If you re less than 20 weeks drink four 8-ounce glasses of fluid an hour before your exam. If you need to empty your bladder before your exam, try to release only a little urine. Then, drink another glass of fluid.  You may have up to two family members in the exam room. If you bring a small child, an adult must be there to care for him or her.  Please call the Imaging Department at your exam site with any questions.            May 16, 2018  3:00 PM CDT   ESTABLISHED PRENATAL with Rojas Carvajal MD   [facility] ([facility])    [address]  [place] [address] [phone]            May 21, 2018  1:45 PM CDT   ESTABLISHED PRENATAL with Cephas Mawuena Agbeh, MD   [facility] [place] ([facility]    [address] [facility] [address] [facility]  [place] [address] [phone]              Future tests that were ordered for you today     Open Future Orders        Priority Expected Expires Ordered    US OB Ltd One Or More Fetus FU/Repeat Routine  5/7/2019 5/7/2018            Who to contact     If you have questions or need follow up information about today's clinic visit or your schedule please contact [facility] directly at [phone].  Normal or non-critical lab and imaging results will be communicated to you by MyChart, letter or phone within 4 business days after the clinic has received the results. If you do not hear from us within 7 days, please contact the clinic through Facteryhart or phone. If you have a critical or abnormal lab result, we will notify you by phone as soon as possible.  Submit refill requests through Foundation Software or call your pharmacy and they will forward the refill request to us. Please allow 3  "business days for your refill to be completed.          Additional Information About Your Visit        MyChart Information     [employer] lets you send messages to your doctor, view your test results, renew your prescriptions, schedule appointments and more. To sign up, go to [email or web address] . Click on \"Log in\" on the left side of the screen, which will take you to the Welcome page. Then click on \"Sign up Now\" on the right side of the page.     You will be asked to enter the access code listed below, as well as some personal information. Please follow the directions to create your username and password.     Your access code is: [email or web address]  Expires: 5/10/2018  2:11 PM     Your access code will  in 90 days. If you need help or a new code, please call your [place] clinic or [phone].        Care EveryWhere ID     This is your Care EveryWhere ID. This could be used by other organizations to access your [place] medical records  [id number]        Your Vitals Were     Pulse Temperature Last Period BMI (Body Mass Index)          89 98.6  F (37  C) (Tympanic) 2017 22.78 kg/m2         Blood Pressure from Last 3 Encounters:   18 114/77   18 113/68   18 111/71    Weight from Last 3 Encounters:   18 134 lb 12.8 oz (61.1 kg)   18 130 lb 12.8 oz (59.3 kg)   18 126 lb (57.2 kg)               Primary Care Provider Office Phone # Fax #    [facility] [phone]       [address] [facility] [address]        Equal Access to Services     [facility] CRISTIAN : Hadii yunior Diggs, waaxda luqadaha, qaybta kaalmada joyyada, nanda prieto. So [facility] [phone].    ATENCIÓN: Si habla español, tiene a ramos disposición servicios gratuitos de asistencia lingüística. Llame al [phone].    We comply with applicable federal civil rights laws and [place] laws. We do not discriminate on the basis of race, color, " national origin, age, disability, sex, sexual orientation, or gender identity.            Thank you!     Thank you for choosing [facility]  for your care. Our goal is always to provide you with excellent care. Hearing back from our patients is one way we can continue to improve our services. Please take a few minutes to complete the written survey that you may receive in the mail after your visit with us. Thank you!             Your Updated Medication List - Protect others around you: Learn how to safely use, store and throw away your medicines at www.disposemymeds.org.          This list is accurate as of 5/7/18  2:37 PM.  Always use your most recent med list.                   Brand Name Dispense Instructions for use Diagnosis    ferrous sulfate 325 (65 Fe) MG tablet    IRON    60 tablet    Take 1 tablet (325 mg) by mouth 2 times daily    Iron deficiency anemia secondary to inadequate dietary iron intake, Supervision of other normal pregnancy, antepartum       multivitamin CF formula chewable tablet    CHOICEFUL    90 tablet    Take 1 tablet by mouth daily    Supervision of other normal pregnancy, antepartum       prenatal multivitamin plus iron 27-0.8 MG Tabs per tablet     100 tablet    Take 1 tablet by mouth daily    Supervision of other normal pregnancy, antepartum

## 2018-05-07 NOTE — LETTER
[facility]  [address]  [place] [address]  [phone]  2018    RE: Robert Anand,  1990    To  Department of State, [place] of Consular Affairs:    Robert Anand is currently under our care for a confirmed pregnancy. Her estimated due date is 2018.     Robert Anand would benefit from the personal and  assistance of her mother, GARRY GARZA ( 1772), in the immediate postpartum period. Her mother desires a visa to travel from [place] to the [facility] in early MAY, 2018 for this purpose. Please take this information into consideration.    I hope this information is sufficient for your needs.  If you have any additional questions regarding this matter please contact our office.  Thank you.    Sincerely,        CEPHAS AGBEH, MD.

## 2018-05-07 NOTE — PROGRESS NOTES
36w5d  No HA, visual changes, N/V etc.Complaints of irregular contractions. Minimal appetite. Encouraged good nutrition. Labor plan and warning s/s discussed.Growth ultrasound. RTC 1 wk/prn.     ICD-10-CM    1. Supervision of other normal pregnancy, antepartum Z34.80 US OB Ltd One Or More Fetus FU/Repeat     CEPHAS AGBEH, MD.

## 2018-05-16 ENCOUNTER — RADIANT APPOINTMENT (OUTPATIENT)
Dept: ULTRASOUND IMAGING | Facility: CLINIC | Age: 28
End: 2018-05-16
Attending: OBSTETRICS & GYNECOLOGY
Payer: COMMERCIAL

## 2018-05-16 ENCOUNTER — PRENATAL OFFICE VISIT (OUTPATIENT)
Dept: OBGYN | Facility: CLINIC | Age: 28
End: 2018-05-16
Payer: COMMERCIAL

## 2018-05-16 VITALS
SYSTOLIC BLOOD PRESSURE: 119 MMHG | BODY MASS INDEX: 22.81 KG/M2 | OXYGEN SATURATION: 98 % | DIASTOLIC BLOOD PRESSURE: 78 MMHG | WEIGHT: 135 LBS | HEART RATE: 89 BPM

## 2018-05-16 DIAGNOSIS — O36.5931 IUGR (INTRAUTERINE GROWTH RESTRICTION) AFFECTING CARE OF MOTHER, THIRD TRIMESTER, FETUS 1: Primary | ICD-10-CM

## 2018-05-16 DIAGNOSIS — Z34.80 SUPERVISION OF OTHER NORMAL PREGNANCY, ANTEPARTUM: ICD-10-CM

## 2018-05-16 PROCEDURE — 99207 ZZC PRENATAL VISIT: CPT | Mod: 25 | Performed by: OBSTETRICS & GYNECOLOGY

## 2018-05-16 PROCEDURE — 59025 FETAL NON-STRESS TEST: CPT | Performed by: OBSTETRICS & GYNECOLOGY

## 2018-05-16 PROCEDURE — 76816 OB US FOLLOW-UP PER FETUS: CPT

## 2018-05-16 NOTE — MR AVS SNAPSHOT
"              After Visit Summary   5/16/2018    Robert Anand    MRN: [id number]           Patient Information     Date Of Birth          1990        Visit Information        Provider Department      5/16/2018 3:00 PM Rojas Carvajal MD; MULTILINGUAL WORD [facility]         Follow-ups after your visit        Your next 10 appointments already scheduled     May 18, 2018  2:00 PM CDT   ESTABLISHED PRENATAL with Cephas Mawuena Agbeh, MD   [facility]    [address]  [place] [address]   [phone]            May 21, 2018  1:30 PM CDT   ESTABLISHED PRENATAL with Cephas Mawuena Agbeh, MD   [facility]    [address]  [place] [address]   [phone]              Who to contact     If you have questions or need follow up information about today's clinic visit or your schedule please contact [facility] directly at [phone].  Normal or non-critical lab and imaging results will be communicated to you by [employer], letter or phone within 4 business days after the clinic has received the results. If you do not hear from us within 7 days, please contact the clinic through SIGFOXt or phone. If you have a critical or abnormal lab result, we will notify you by phone as soon as possible.  Submit refill requests through Clutch or call your pharmacy and they will forward the refill request to us. Please allow 3 business days for your refill to be completed.          Additional Information About Your Visit        Clutch Information     Clutch lets you send messages to your doctor, view your test results, renew your prescriptions, schedule appointments and more. To sign up, go to [email or web address] . Click on \"Log in\" on the left side of the screen, which will take you to the Welcome page. Then click on \"Sign up Now\" on the right side of the page.     You will be asked " to enter the access code listed below, as well as some personal information. Please follow the directions to create your username and password.     Your access code is: [email or web address]  Expires: 2018  3:40 PM     Your access code will  in 90 days. If you need help or a new code, please call your [facility] clinic or [phone].        Care EveryWhere ID     This is your Care EveryWhere ID. This could be used by other organizations to access your [facility] medical records  [id number]        Your Vitals Were     Pulse Last Period Pulse Oximetry BMI (Body Mass Index)          89 2017 98% 22.81 kg/m2         Blood Pressure from Last 3 Encounters:   18 119/78   18 114/77   18 113/68    Weight from Last 3 Encounters:   18 135 lb (61.2 kg)   18 134 lb 12.8 oz (61.1 kg)   18 130 lb 12.8 oz (59.3 kg)              Today, you had the following     No orders found for display       Primary Care Provider Office Phone # Fax #    [facility] [phone]       [address] [facility] [address]        Equal Access to Services     KT FOSTER AH: Hadii aad ku hadasho Soomaali, waaxda luqadaha, qaybta kaalmada adeegyada, waxay idiin hayjojon joy hayden laparveen prieto. So [facility] [phone].    ATENCIÓN: Si habla español, tiene a ramos disposición servicios gratuitos de asistencia lingüística. Llame al [phone].    We comply with applicable federal civil rights laws and [place] laws. We do not discriminate on the basis of race, color, national origin, age, disability, sex, sexual orientation, or gender identity.            Thank you!     Thank you for choosing [facility]  for your care. Our goal is always to provide you with excellent care. Hearing back from our patients is one way we can continue to improve our services. Please take a few minutes to complete the written survey that you may receive in the mail after your visit with us. Thank  you!             Your Updated Medication List - Protect others around you: Learn how to safely use, store and throw away your medicines at www.disposemymeds.org.          This list is accurate as of 5/16/18  3:40 PM.  Always use your most recent med list.                   Brand Name Dispense Instructions for use Diagnosis    ferrous sulfate 325 (65 Fe) MG tablet    IRON    60 tablet    Take 1 tablet (325 mg) by mouth 2 times daily    Iron deficiency anemia secondary to inadequate dietary iron intake, Supervision of other normal pregnancy, antepartum       multivitamin CF formula chewable tablet    CHOICEFUL    90 tablet    Take 1 tablet by mouth daily    Supervision of other normal pregnancy, antepartum       prenatal multivitamin plus iron 27-0.8 MG Tabs per tablet     100 tablet    Take 1 tablet by mouth daily    Supervision of other normal pregnancy, antepartum

## 2018-05-17 ENCOUNTER — TELEPHONE (OUTPATIENT)
Dept: OBGYN | Facility: CLINIC | Age: 28
End: 2018-05-17

## 2018-05-17 NOTE — TELEPHONE ENCOUNTER
Health Call Center    Phone Message    May a detailed message be left on voicemail: no    Reason for Call: Patient called stating they received a call from Dr. Agbeh, however I do not see any notes on that., Requesting a call back     Action Taken: Message routed to:  [facility] p [phone]

## 2018-05-18 ENCOUNTER — PRENATAL OFFICE VISIT (OUTPATIENT)
Dept: OBGYN | Facility: CLINIC | Age: 28
End: 2018-05-18
Payer: COMMERCIAL

## 2018-05-18 VITALS
BODY MASS INDEX: 22.92 KG/M2 | DIASTOLIC BLOOD PRESSURE: 75 MMHG | TEMPERATURE: 98.3 F | WEIGHT: 135.6 LBS | SYSTOLIC BLOOD PRESSURE: 115 MMHG | HEART RATE: 76 BPM

## 2018-05-18 DIAGNOSIS — O36.5931 IUGR (INTRAUTERINE GROWTH RESTRICTION) AFFECTING CARE OF MOTHER, THIRD TRIMESTER, FETUS 1: ICD-10-CM

## 2018-05-18 DIAGNOSIS — Z34.80 SUPERVISION OF OTHER NORMAL PREGNANCY, ANTEPARTUM: Primary | ICD-10-CM

## 2018-05-18 PROBLEM — O36.5934 IUGR (INTRAUTERINE GROWTH RESTRICTION) AFFECTING CARE OF MOTHER, THIRD TRIMESTER, FETUS 4: Status: ACTIVE | Noted: 2018-05-18

## 2018-05-18 PROBLEM — O36.5933 IUGR (INTRAUTERINE GROWTH RESTRICTION) AFFECTING CARE OF MOTHER, THIRD TRIMESTER, FETUS 3: Status: ACTIVE | Noted: 2018-05-18

## 2018-05-18 PROCEDURE — 59025 FETAL NON-STRESS TEST: CPT | Performed by: OBSTETRICS & GYNECOLOGY

## 2018-05-18 PROCEDURE — 99207 ZZC PRENATAL VISIT: CPT | Performed by: OBSTETRICS & GYNECOLOGY

## 2018-05-18 NOTE — MR AVS SNAPSHOT
After Visit Summary   5/18/2018    Robert Anand    MRN: [id number]           Patient Information     Date Of Birth          1990        Visit Information        Provider Department      5/18/2018 3:15 PM Agbeh, Cephas Mawuena, MD; MULTILINGUAL WORD [facility] [place]        Today's Diagnoses     Supervision of other normal pregnancy, antepartum    -  1    IUGR (intrauterine growth restriction) affecting care of mother, third trimester, fetus 1          Care Instructions                                                        If you have any questions regarding your visit, Please contact your care team.    [facility]: [phone]      [facility] CLINIC HOURS TELEPHONE NUMBER   Cephas Agbeh, M.D.    MARLYN Harrell,     DAVIAN Lewis RN             Monday-[place]    8:00a.m-4:45 p.m    Tuesday--[place]     8:00a.m-4:45 p.m.    Thursday-[place]    8:00a.m-4:45 p.m.    Friday-[place]    8:00a.m-4:45 p.m    [facility]   [address] [place] [address]   [phone]-Ask for [facility]   Fax [phone]   Wskktbb-[phone]     [facility] Labor and Delivery   [address] [place] [address] [place] [address]   [phone]     [facility]   [address] [facility] [address] [phone]   [id number]-[phone]    Urgent Care locations:    [facility]  Monday-Friday   5 pm - 9 pm   Saturday and Sunday    9 am - 5 pm      Monday-Friday    11 pm - 9 pm  Saturday and Sunday   9 am - 5 pm    [phone]     If you need a medication refill, please contact your pharmacy. Please allow 3 business days for your refill to be completed.  As always, Thank you for trusting us with your healthcare needs!            Follow-ups after your visit        Your next 10 appointments already scheduled     May 18, 2018  3:15 PM CDT   ESTABLISHED PRENATAL with Cephas Mawuena Agbeh,  "MD   [facility] [place] ([facility])    [address] [place] [address]   [phone]            May 21, 2018  1:30 PM CDT   ESTABLISHED PRENATAL with Cephas Mawuena Agbeh, MD   [facility] [place] ([facility])    [address] [place] [address]   [phone]              Who to contact     If you have questions or need follow up information about today's clinic visit or your schedule please contact [facility] directly at [phone].  Normal or non-critical lab and imaging results will be communicated to you by MyChart, letter or phone within 4 business days after the clinic has received the results. If you do not hear from us within 7 days, please contact the clinic through Moveratihart or phone. If you have a critical or abnormal lab result, we will notify you by phone as soon as possible.  Submit refill requests through [employer] or call your pharmacy and they will forward the refill request to us. Please allow 3 business days for your refill to be completed.          Additional Information About Your Visit        [employer] Information     [employer] lets you send messages to your doctor, view your test results, renew your prescriptions, schedule appointments and more. To sign up, go to [email or web address] . Click on \"Log in\" on the left side of the screen, which will take you to the Welcome page. Then click on \"Sign up Now\" on the right side of the page.     You will be asked to enter the access code listed below, as well as some personal information. Please follow the directions to create your username and password.     Your access code is: [email or web address]  Expires: 2018  3:40 PM     Your access code will  in 90 days. If you need help or a new code, please call your [facility] or [phone].        Care EveryWhere ID     This is your Care EveryWhere ID. This could be used by other organizations to access your [facility] " medical records  [id number]        Your Vitals Were     Pulse Temperature Last Period BMI (Body Mass Index)          76 98.3  F (36.8  C) (Tympanic) 08/27/2017 22.92 kg/m2         Blood Pressure from Last 3 Encounters:   05/18/18 115/75   05/16/18 119/78   05/07/18 114/77    Weight from Last 3 Encounters:   05/18/18 135 lb 9.6 oz (61.5 kg)   05/16/18 135 lb (61.2 kg)   05/07/18 134 lb 12.8 oz (61.1 kg)              We Performed the Following     FETAL NON-STRESS TEST        Primary Care Provider Office Phone # Fax #    [facility] [phone] [address] [facility] [address]        Equal Access to Services     MURTAZA FOSTER : Hadii aad ku hadasho Sojm, waaxda luqadaha, qaybta kaalmada adesandy, nanda matias . So [facility] [phone].    ATENCIÓN: Si habla español, tiene a ramos disposición servicios gratuitos de asistencia lingüística. Robert al [phone].    We comply with applicable federal civil rights laws and [place] laws. We do not discriminate on the basis of race, color, national origin, age, disability, sex, sexual orientation, or gender identity.            Thank you!     Thank you for choosing [facility]  for your care. Our goal is always to provide you with excellent care. Hearing back from our patients is one way we can continue to improve our services. Please take a few minutes to complete the written survey that you may receive in the mail after your visit with us. Thank you!             Your Updated Medication List - Protect others around you: Learn how to safely use, store and throw away your medicines at www.disposemymeds.org.          This list is accurate as of 5/18/18  2:47 PM.  Always use your most recent med list.                   Brand Name Dispense Instructions for use Diagnosis    ferrous sulfate 325 (65 Fe) MG tablet    IRON    60 tablet    Take 1 tablet (325 mg) by mouth 2 times daily    Iron deficiency  anemia secondary to inadequate dietary iron intake, Supervision of other normal pregnancy, antepartum       multivitamin CF formula chewable tablet    CHOICEFUL    90 tablet    Take 1 tablet by mouth daily    Supervision of other normal pregnancy, antepartum       prenatal multivitamin plus iron 27-0.8 MG Tabs per tablet     100 tablet    Take 1 tablet by mouth daily    Supervision of other normal pregnancy, antepartum

## 2018-05-18 NOTE — PROGRESS NOTES
38w2d  No leakage of fluid.  Denies HA, visual changes etc.  Due to IUGR per U/S as bellow,she accepts induction today. Will go to L&D. Dr. Lerma notified..  Here with spouse.  NST IS:  Reactive (2 accl > 15 BPM in 20 min., each lasting approx. 15 seconds)  NST Baseline Rate 140s  Variability:  Average  Accelerations:Present  Variable Decelerations:No  Other Decelerations:No  Amniotic Fluid Assessment: Normal  Contractions: irregular    Results for orders placed or performed in visit on 05/16/18   US OB Ltd One Or More Fetus FU/Repeat    Narrative    ULTRASOUND OBSTETRIC SINGLE FOLLOWUP/REPEAT  5/16/2018 2:36 PM    HISTORY:  Supervision of other normal pregnancy, antepartum.    COMPARISON: OB ultrasound 4/2/2018.    FINDINGS:   Presentation: Cephalic.  Cardiac activity: 138 bpm. Regular rhythm.  Movement: Unremarkable.  Placenta: Posterior. No evidence for placenta previa.  Adnexa: Unremarkable.   Cervical length: 3.9.   Amniotic fluid: Unremarkable. WESLEY: 12.8 cm.    Other findings: None.  A complete anatomy scan was not performed.     Measured parameters:       BPD:  8.5 cm      Age: 34 weeks 2 days.       HC:    31.5 cm      Age: 35 weeks 3 days.       AC:  31.2 cm      Age: 35 weeks 2 days.       FL:   7.1 cm      Age: 36 weeks 1 day.    Gestational age by current ultrasound measurement: 35 weeks 2 days,  corresponding to an RUTHANN of 6/18/2018.    Gestational age based on the reported previously established due date:  38 weeks 0 days, corresponding to an RUTHANN of 5/30/2018.    Estimated fetal weight: 2653 grams, corresponding to the 8th  percentile based on the reported previously established due date.       Impression    IMPRESSION:    1. Single live intrauterine pregnancy of 35 weeks 2 days gestation by  current ultrasound measurement. Fetal growth is 19 days less advanced  than what is expected from the reported previously established due  date.  2. Head circumference measurements are less than the 2nd  percentile  and have diminished since prior exam. Abdominal circumference is at  the 5th percentile down from the 11th percentile on prior study. Femur  length is at the 12th percentile, previously the 21st percentile. All  growth parameters have diminished questioning the possibility of  symmetric intrauterine growth retardation. Follow up as clinically  warranted.    WHITNEY FIELDS MD       ICD-10-CM    1. Supervision of other normal pregnancy, antepartum Z34.80 FETAL NON-STRESS TEST   2. IUGR (intrauterine growth restriction) affecting care of mother, third trimester, fetus 1 O36.5931 FETAL NON-STRESS TEST     CEPHAS AGBEH, MD.

## 2018-05-18 NOTE — PATIENT INSTRUCTIONS
If you have any questions regarding your visit, Please contact your care team.    [facility] Access Services: [phone]      [facility] CLINIC HOURS TELEPHONE NUMBER   Cephas Agbeh, M.D.    MARLYN Harrell,     DAVIAN Lewis, DAVIAN             Monday-[place]    8:00a.m-4:45 p.m    Tuesday--[place]     8:00a.m-4:45 p.m.    Thursday-[place]    8:00a.m-4:45 p.m.    Friday-[place]    8:00a.m-4:45 p.m    [facility]   [address]   [place] [address] [phone]-Ask for [facility]   Fax [phone]   Qxdsfky-[phone]     [facility] Labor and Delivery   [address] [place] [address] [address]   [place], [place] [address] [phone]     [facility]   [address] [facility] [address] [phone]   [place]-[phone]    Urgent Care locations:    [facility]  Monday-Friday   5 pm - 9 pm   Saturday and Sunday    9 am - 5 pm      Monday-Friday    11 pm - 9 pm  Saturday and Sunday   9 am - 5 pm    [phone]     If you need a medication refill, please contact your pharmacy. Please allow 3 business days for your refill to be completed.  As always, Thank you for trusting us with your healthcare needs!

## 2018-05-18 NOTE — TELEPHONE ENCOUNTER
Patient spoke with Dr. Agbeh-    Result Notes   Notes Recorded by Agbeh, Cephas Mawuena, MD on 5/17/2018 at 5:52 PM  I spoke with the patient about IUGR per ultrasound results.I recommended delivery today.She and spouse want to wait till office visit tomorrow to make a decision. She says there vis good fetal movement. She will call or go to L&D if there is fetal well being concerns or if she changes her mind about delivery before appointment tomorrow.  CEPHAS AGBEH, MD.

## 2018-05-21 NOTE — PROGRESS NOTES
38w0d    No HA, visual changes, N/V   Had ultrasound today and BPD and HC are <2 percentile.  NST today  See Dr. Agbeh Friday (add on) to consider induction, no later than 39 weeks  Labor Precautions    Rojas Carvajal MD      PROCEDURE:  NST  Diagnosis:  Growth restriction   NST IS:  Reactive (2 accl > 15 BPM in 20 min., each lasting approx. 15 seconds)  NST Baseline Rate 130  Variability:  Moderate   Accelerations:Present  Variable Decelerations:No  Other Decelerations:No  Amniotic Fluid Assessment: Normal (upon review of the ultrasound films)  Contractions: irregular     Rojas Carvajal MD

## 2018-07-03 ENCOUNTER — OFFICE VISIT (OUTPATIENT)
Dept: OBGYN | Facility: CLINIC | Age: 28
End: 2018-07-03
Payer: COMMERCIAL

## 2018-07-03 VITALS
SYSTOLIC BLOOD PRESSURE: 111 MMHG | OXYGEN SATURATION: 97 % | WEIGHT: 125.5 LBS | HEART RATE: 105 BPM | BODY MASS INDEX: 21.21 KG/M2 | DIASTOLIC BLOOD PRESSURE: 75 MMHG

## 2018-07-03 DIAGNOSIS — R35.0 URINARY FREQUENCY: ICD-10-CM

## 2018-07-03 DIAGNOSIS — R30.9 PAIN WITH URINATION: Primary | ICD-10-CM

## 2018-07-03 LAB
ALBUMIN UR-MCNC: 100 MG/DL
APPEARANCE UR: ABNORMAL
BACTERIA #/AREA URNS HPF: ABNORMAL /HPF
BILIRUB UR QL STRIP: NEGATIVE
COLOR UR AUTO: ABNORMAL
GLUCOSE UR STRIP-MCNC: NEGATIVE MG/DL
GRAN CASTS #/AREA URNS LPF: ABNORMAL /LPF
HGB UR QL STRIP: ABNORMAL
KETONES UR STRIP-MCNC: 10 MG/DL
LEUKOCYTE ESTERASE UR QL STRIP: ABNORMAL
NITRATE UR QL: NEGATIVE
PH UR STRIP: 5.5 PH (ref 5–7)
RBC #/AREA URNS AUTO: >100 /HPF
SOURCE: ABNORMAL
SP GR UR STRIP: 1.02 (ref 1–1.03)
UROBILINOGEN UR STRIP-MCNC: NORMAL MG/DL (ref 0–2)
WBC #/AREA URNS AUTO: >100 /HPF

## 2018-07-03 PROCEDURE — 99214 OFFICE O/P EST MOD 30 MIN: CPT | Performed by: OBSTETRICS & GYNECOLOGY

## 2018-07-03 PROCEDURE — 81001 URINALYSIS AUTO W/SCOPE: CPT | Performed by: OBSTETRICS & GYNECOLOGY

## 2018-07-03 RX ORDER — NITROFURANTOIN 25; 75 MG/1; MG/1
100 CAPSULE ORAL 2 TIMES DAILY
Qty: 14 CAPSULE | Refills: 0 | Status: SHIPPED | OUTPATIENT
Start: 2018-07-03 | End: 2018-10-02

## 2018-07-03 NOTE — MR AVS SNAPSHOT
After Visit Summary   7/3/2018    Robert Anand    MRN: [id number]           Patient Information     Date Of Birth          1990        Visit Information        Provider Department      7/3/2018 3:00 PM Agbeh, Cephas Mawuena, MD; PHONE,  [facility]        Today's Diagnoses     Pain with urination    -  1    Urinary frequency           Follow-ups after your visit        Your next 10 appointments already scheduled     Jul 13, 2018  1:00 PM CDT   Post Partum with Cephas Mawuena Agbeh, MD   [facility]    [address] [facility] [address] [phone]              Who to contact     If you have questions or need follow up information about today's clinic visit or your schedule please contact [facility] directly at [phone].  Normal or non-critical lab and imaging results will be communicated to you by MyChart, letter or phone within 4 business days after the clinic has received the results. If you do not hear from us within 7 days, please contact the clinic through MyChart or phone. If you have a critical or abnormal lab result, we will notify you by phone as soon as possible.  Submit refill requests through [employer] or call your pharmacy and they will forward the refill request to us. Please allow 3 business days for your refill to be completed.          Additional Information About Your Visit        Care EveryWhere ID     This is your Care EveryWhere ID. This could be used by other organizations to access your [facility] medical records  [id number]        Your Vitals Were     Pulse Last Period Pulse Oximetry BMI (Body Mass Index)          105 08/27/2017 97% 21.21 kg/m2         Blood Pressure from Last 3 Encounters:   07/03/18 111/75   05/18/18 115/75   05/16/18 119/78    Weight from Last 3 Encounters:   07/03/18 125 lb 8 oz (56.9 kg)   05/18/18 135 lb 9.6 oz (61.5 kg)   05/16/18 135 lb (61.2  kg)              We Performed the Following     UA reflex to Microscopic ([place]; [facility])     Urine Microscopic          Today's Medication Changes          These changes are accurate as of 7/3/18  5:14 PM.  If you have any questions, ask your nurse or doctor.               Start taking these medicines.        Dose/Directions    nitroFURantoin (macrocrystal-monohydrate) 100 MG capsule   Commonly known as:  MACROBID   Used for:  Urinary frequency, Pain with urination        Dose:  100 mg   Take 1 capsule (100 mg) by mouth 2 times daily   Quantity:  14 capsule   Refills:  0            Where to get your medicines      These medications were sent to [facility] [place] - [place], [place] - [address]  [address], [facility] [address]     Phone:  [phone]     nitroFURantoin (macrocrystal-monohydrate) 100 MG capsule                Primary Care Provider Office Phone # Fax #    [facility] [phone] [address] [facility] [address]        Equal Access to Services     [facility]: Hadii aad ku hadasho Soomaali, waaxda luqadaha, qaybta kaalmada adeegyada, waxay idiin haydangelo matias . So [facility] [phone].    ATENCIÓN: Si habla español, tiene a ramos disposición servicios gratuitos de asistencia lingüística. Llame al [phone].    We comply with applicable federal civil rights laws and [place] laws. We do not discriminate on the basis of race, color, national origin, age, disability, sex, sexual orientation, or gender identity.            Thank you!     Thank you for choosing [facility]  for your care. Our goal is always to provide you with excellent care. Hearing back from our patients is one way we can continue to improve our services. Please take a few minutes to complete the written survey that you may receive in the mail after your visit with us. Thank you!             Your Updated  Medication List - Protect others around you: Learn how to safely use, store and throw away your medicines at www.disposemymeds.org.          This list is accurate as of 7/3/18  5:14 PM.  Always use your most recent med list.                   Brand Name Dispense Instructions for use Diagnosis    ferrous sulfate 325 (65 Fe) MG tablet    IRON    60 tablet    Take 1 tablet (325 mg) by mouth 2 times daily    Iron deficiency anemia secondary to inadequate dietary iron intake, Supervision of other normal pregnancy, antepartum       multivitamin CF formula chewable tablet    CHOICEFUL    90 tablet    Take 1 tablet by mouth daily    Supervision of other normal pregnancy, antepartum       nitroFURantoin (macrocrystal-monohydrate) 100 MG capsule    MACROBID    14 capsule    Take 1 capsule (100 mg) by mouth 2 times daily    Urinary frequency, Pain with urination       prenatal multivitamin plus iron 27-0.8 MG Tabs per tablet     100 tablet    Take 1 tablet by mouth daily    Supervision of other normal pregnancy, antepartum

## 2018-07-03 NOTE — PROGRESS NOTES
Robert is a 28 year old  referred here by self for consultation regarding painful urination for a few days. No fever or chills..    ROS: Ten point review of systems was reviewed and negative except the above.    Gyne: - abn pap (last pap ), - STD's    No past medical history on file.  Past Surgical History:   Procedure Laterality Date     laproscopy      infertility     Patient Active Problem List   Diagnosis     Female infertility     Male infertility     Irregular menses     Supervision of other normal pregnancy, antepartum     IUGR (intrauterine growth restriction) affecting care of mother, third trimester, fetus 1       ALL/Meds: Her medication and allergy histories were reviewed and are documented in their appropriate chart areas.    SH: - tob, - EtOH,     FH: Her family history was reviewed and documented in its appropriate chart area.    PE: /75 (Patient Position: Sitting, Cuff Size: Adult Regular)  Pulse 105  Wt 125 lb 8 oz (56.9 kg)  LMP 2017  SpO2 97%  BMI 21.21 kg/m2  Body mass index is 21.21 kg/(m^2).    General Appearance:  healthy, alert, active, no distress  HEENT: NCAT  Abdomen: Soft, with suprapubic tenderness.  Normal bowel sounds.  No masses  Results for orders placed or performed in visit on 18   UA reflex to Microscopic ([place]; [facility])   Result Value Ref Range    Color Urine Red     Appearance Urine Cloudy     Glucose Urine Negative NEG^Negative mg/dL    Bilirubin Urine Negative NEG^Negative    Ketones Urine 10 (A) NEG^Negative mg/dL    Specific Gravity Urine 1.016 1.003 - 1.035    Blood Urine Large (A) NEG^Negative    pH Urine 5.5 5.0 - 7.0 pH    Protein Albumin Urine 100 (A) NEG^Negative mg/dL    Urobilinogen mg/dL Normal 0.0 - 2.0 mg/dL    Nitrite Urine Negative NEG^Negative    Leukocyte Esterase Urine Large (A) NEG^Negative    Source Midstream Urine    Urine Microscopic   Result Value Ref Range    WBC Urine >100 (A) OTO5^0 - 5 /HPF     RBC Urine >100 (A) OTO2^O - 2 /HPF    Bacteria Urine Many (A) NEG^Negative /HPF    Granular Casts 2-5 (A) NEG^Negative /LPF       A/P    ICD-10-CM    1. Pain with urination R30.9 *UA reflex to Microscopic     UA reflex to Microscopic (Denice Christensen; [facility])     Urine Microscopic     nitroFURantoin, macrocrystal-monohydrate, (MACROBID) 100 MG capsule     CANCELED: *UA reflex to Microscopic   2. Urinary frequency R35.0 nitroFURantoin, macrocrystal-monohydrate, (MACROBID) 100 MG capsule       25 minutes was spent face to face with the patient today discussing her history, diagnosis, and follow-up plan as noted above.  Over 50% of the visit was spent in counseling and coordination of care.    Total Visit Time: 30 minutes.    CEPHAS AGBEH, MD.

## 2018-07-13 ENCOUNTER — PRENATAL OFFICE VISIT (OUTPATIENT)
Dept: OBGYN | Facility: CLINIC | Age: 28
End: 2018-07-13
Payer: COMMERCIAL

## 2018-07-13 VITALS
HEART RATE: 87 BPM | WEIGHT: 127.4 LBS | SYSTOLIC BLOOD PRESSURE: 122 MMHG | DIASTOLIC BLOOD PRESSURE: 75 MMHG | TEMPERATURE: 97.5 F | BODY MASS INDEX: 21.53 KG/M2

## 2018-07-13 PROCEDURE — 99207 ZZC POST PARTUM EXAM: CPT | Performed by: OBSTETRICS & GYNECOLOGY

## 2018-07-13 ASSESSMENT — ANXIETY QUESTIONNAIRES
7. FEELING AFRAID AS IF SOMETHING AWFUL MIGHT HAPPEN: NOT AT ALL
5. BEING SO RESTLESS THAT IT IS HARD TO SIT STILL: NOT AT ALL
GAD7 TOTAL SCORE: 0
3. WORRYING TOO MUCH ABOUT DIFFERENT THINGS: NOT AT ALL
6. BECOMING EASILY ANNOYED OR IRRITABLE: NOT AT ALL
1. FEELING NERVOUS, ANXIOUS, OR ON EDGE: NOT AT ALL
2. NOT BEING ABLE TO STOP OR CONTROL WORRYING: NOT AT ALL
IF YOU CHECKED OFF ANY PROBLEMS ON THIS QUESTIONNAIRE, HOW DIFFICULT HAVE THESE PROBLEMS MADE IT FOR YOU TO DO YOUR WORK, TAKE CARE OF THINGS AT HOME, OR GET ALONG WITH OTHER PEOPLE: NOT DIFFICULT AT ALL

## 2018-07-13 ASSESSMENT — PATIENT HEALTH QUESTIONNAIRE - PHQ9: 5. POOR APPETITE OR OVEREATING: NOT AT ALL

## 2018-07-13 NOTE — MR AVS SNAPSHOT
After Visit Summary   7/13/2018    Robert Aannd    MRN: [id number]           Patient Information     Date Of Birth          1990        Visit Information        Provider Department      7/13/2018 12:45 PM Agbeh, Cephas Mawuena, MD; MULTILINGUAL WORD [facility]        Care Instructions                                                        If you have any questions regarding your visit, Please contact your care team.    [facility] Access Services: [phone]      [facility] CLINIC HOURS TELEPHONE NUMBER   Cephas Agbeh, M.D.    MARLYN Harrell,     DAVIAN Lewis, DAVIAN             Monday-[place]    8:00a.m-4:45 p.m    Tuesday--[place]     8:00a.m-4:45 p.m.    Thursday-[place]    8:00a.m-4:45 p.m.    Friday-[place]    8:00a.m-4:45 p.m    [facility]   [address] [place] [address]   [phone]-Ask for [facility]   Fax [phone]   Xegwpkn-[phone]     [facility]   [address] [place] [address] [place] [address]   [phone]     [facility]   [address] [facility] [address]   [phone]   Vavrdsr-[phone]    Urgent Care locations:    [facility]  Monday-Friday   5 pm - 9 pm   Saturday and Sunday    9 am - 5 pm      Monday-Friday    11 pm - 9 pm  Saturday and Sunday   9 am - 5 pm    [phone]     If you need a medication refill, please contact your pharmacy. Please allow 3 business days for your refill to be completed.  As always, Thank you for trusting us with your healthcare needs!            Follow-ups after your visit        Who to contact     If you have questions or need follow up information about today's clinic visit or your schedule please contact [facility] directly at [phone].  Normal or non-critical lab and imaging results will be communicated to you by MyChart, letter or phone within 4 business  days after the clinic has received the results. If you do not hear from us within 7 days, please contact the clinic through [employer] or phone. If you have a critical or abnormal lab result, we will notify you by phone as soon as possible.  Submit refill requests through [employer] or call your pharmacy and they will forward the refill request to us. Please allow 3 business days for your refill to be completed.          Additional Information About Your Visit        Care EveryWhere ID     This is your Care EveryWhere ID. This could be used by other organizations to access your [place] medical records  [id number]        Your Vitals Were     Pulse Temperature Last Period BMI (Body Mass Index)          87 97.5  F (36.4  C) (Tympanic) 08/27/2017 21.53 kg/m2         Blood Pressure from Last 3 Encounters:   07/13/18 122/75   07/03/18 111/75   05/18/18 115/75    Weight from Last 3 Encounters:   07/13/18 127 lb 6.4 oz (57.8 kg)   07/03/18 125 lb 8 oz (56.9 kg)   05/18/18 135 lb 9.6 oz (61.5 kg)              Today, you had the following     No orders found for display       Primary Care Provider Office Phone # Fax #    [facility] [phone]       [address] [facility] [address]        Equal Access to Services     KT FOSTER AH: Hadii aad ku hadasho Soomaali, waaxda luqadaha, qaybta kaalmada adeegyada, waxay ghazal haydangelo matias . So [facility] [phone].    ATENCIÓN: Si habla español, tiene a ramos disposición servicios gratuitos de asistencia lingüística. Llame al [phone].    We comply with applicable federal civil rights laws and [place] laws. We do not discriminate on the basis of race, color, national origin, age, disability, sex, sexual orientation, or gender identity.            Thank you!     Thank you for choosing [facility]  for your care. Our goal is always to provide you with excellent care. Hearing back from our patients is one way we can continue to  improve our services. Please take a few minutes to complete the written survey that you may receive in the mail after your visit with us. Thank you!             Your Updated Medication List - Protect others around you: Learn how to safely use, store and throw away your medicines at www.disposemymeds.org.          This list is accurate as of 7/13/18  1:03 PM.  Always use your most recent med list.                   Brand Name Dispense Instructions for use Diagnosis    ferrous sulfate 325 (65 Fe) MG tablet    IRON    60 tablet    Take 1 tablet (325 mg) by mouth 2 times daily    Iron deficiency anemia secondary to inadequate dietary iron intake, Supervision of other normal pregnancy, antepartum       multivitamin CF formula chewable tablet    CHOICEFUL    90 tablet    Take 1 tablet by mouth daily    Supervision of other normal pregnancy, antepartum       nitroFURantoin (macrocrystal-monohydrate) 100 MG capsule    MACROBID    14 capsule    Take 1 capsule (100 mg) by mouth 2 times daily    Urinary frequency, Pain with urination       prenatal multivitamin plus iron 27-0.8 MG Tabs per tablet     100 tablet    Take 1 tablet by mouth daily    Supervision of other normal pregnancy, antepartum

## 2018-07-13 NOTE — PROGRESS NOTES
07/13/18 1351    Assessments    Assessment Instrument PHQ-9; CORWIN-7  by Sharri Orozco CMA  at 07/13/18 1351    PHQ-9 Developed by Angie Gabriel,Shanta Beckman,Rehan Bernard and Colleagues,with an Educational Rigo From [employer]   PHQ-9 Patient Health Questionaire: Over the last 2 weeks, how often have you been bothered by any of the following problems?     1.  Little interest or pleasure in doing things Not at all  by Sharri Orozco CMA  at 07/13/18 1351    2.  Feeling down, depressed, or hopeless Not at all     3.  Trouble falling or staying asleep, or sleeping too much Not at all     4.  Feeling tired or having little energy Not at all     5.  Poor appetite or overeating Not at all     6.  Feeling bad about yourself - or that you are a failure or have let yourself or your family down Not at all     7.  Trouble concentrating on things, such as reading the newspaper or watching television Not at all     8.  Moving or speaking so slowly that other people could have noticed. Or the opposite - being so fidgety or restless that you have been moving around a lot more than usual Not at all     9.  Thoughts that you would be better off dead, or of hurting yourself in some way Not at all  by Sharri Orozco CMA  at 07/13/18 1351    PHQ-9 Total Score 0 (calculated)  by Sharri Orozco CMA  at 07/13/18 1351    If you checked off any problems, how difficult have these problems made it for you to do your work, take care of things at home, or get along with other people? Not difficult at all     CORWIN-7 Anxiety ([employer], 2002; Used with Permission)  Over the LAST 2 WEEKS, how often have you been bothered by the following problems?   1. Feeling nervous, anxious, or on edge Not at all     2. Not being able to stop or control worrying Not at all     3. Worrying too much about different things Not at all  by Sharri Orozco CMA  at 07/13/18 1351    4. Trouble relaxing Not at  all  by Sharri Orozco CMA  at 07/13/18 1351    5. Being so restless that it is hard to sit still Not at all  by Sharri Orozco CMA  at 07/13/18 1351    6. Becoming easily annoyed or irritable Not at all     7. Feeling afraid, as if something awful might happen Not at all     CORWIN-7 Total Score 0 (calculated)  by Sharri Orozco CMA  at 07/13/18 1351    If you checked any problems, how difficult have they made it for you to do your work, take care of things at home, or get along with other people? Not difficult at all

## 2018-07-13 NOTE — PROGRESS NOTES
Robert is here for a 6-week postpartum checkup.    She had a  of a liveborn baby girl,  2 kg  The delivery was uncomplicated.  Since delivery, she has been breast feeding.  She has not had a normal menses.  She has not had intercourse.  Patient screened for postpartum depression and complaints are NEGATIVE. Screening has also been completed for intimate partner violence. Completed Abx for UTI. No more symptoms.    Her last pap was  and was Normal    EXAM: /75  Pulse 87  Temp 97.5  F (36.4  C) (Tympanic)  Wt 127 lb 6.4 oz (57.8 kg)  LMP 2017  Breastfeeding? Yes  BMI 21.53 kg/m2    HEENT: grossly normal.  NECK: no lymphadenopathy or thyromegaly.  ABDOMEN: soft, non tender, good bowel sounds, without masses, rebound, guarding or tenderness.  INC: well healed   EXTREMITIES: Warm to touch, no ankle edema or calf tenderness.    PELVIC:    External genitalia: normal without lesion,  perineum well healed   Vagina: normal mucosa and rugae, normal discharge.  Cervix: normal without lesion.  Uterus: small, mobile, nontender.  Adnexa: No masses, No tenderness  Rectal: deferred, external hemorrhoids absent    A/P  Routine Postpartum    ICD-10-CM    1. Routine postpartum follow-up Z39.2 CANCELED: UA with Microscopic reflex to Culture     1. Contraception: nothing.  2. Annual due in  every 12 months    CEPHAS AGBEH, MD.

## 2018-07-13 NOTE — PATIENT INSTRUCTIONS
If you have any questions regarding your visit, Please contact your care team.    [facility] Access Services: [phone]      [facility] CLINIC HOURS TELEPHONE NUMBER   Cephas Agbeh, M.D.    MARLYN Harrell,     DAVIAN Lewis, DAVIAN             Monday-[place]    8:00a.m-4:45 p.m    Tuesday [place]     8:00a.m-4:45 p.m.    Thursday-[place]    8:00a.m-4:45 p.m.    Friday-[place]    8:00a.m-4:45 p.m    [facility]   [address]   [place], [place] [address] [phone]-Ask for [facility]   Fax [phone]   Baottoi-[phone]     [facility] Delivery   [address] [place] [address] [place] [address]   [phone]     [facility]   [address] [facility] [address] [phone]   Pbwhbhe-[phone]    Urgent Care locations:    [facility]  Monday-Friday   5 pm - 9 pm   Saturday and Sunday    9 am - 5 pm      Monday-Friday    11 pm - 9 pm  Saturday and Sunday   9 am - 5 pm    [phone]     If you need a medication refill, please contact your pharmacy. Please allow 3 business days for your refill to be completed.  As always, Thank you for trusting us with your healthcare needs!

## 2018-07-14 ASSESSMENT — ANXIETY QUESTIONNAIRES: GAD7 TOTAL SCORE: 0

## 2018-07-14 ASSESSMENT — PATIENT HEALTH QUESTIONNAIRE - PHQ9: SUM OF ALL RESPONSES TO PHQ QUESTIONS 1-9: 0

## 2018-10-02 ENCOUNTER — OFFICE VISIT (OUTPATIENT)
Dept: FAMILY MEDICINE | Facility: CLINIC | Age: 28
End: 2018-10-02
Payer: COMMERCIAL

## 2018-10-02 VITALS
RESPIRATION RATE: 16 BRPM | HEART RATE: 77 BPM | DIASTOLIC BLOOD PRESSURE: 78 MMHG | BODY MASS INDEX: 21.65 KG/M2 | HEIGHT: 64 IN | SYSTOLIC BLOOD PRESSURE: 123 MMHG | TEMPERATURE: 98.3 F | WEIGHT: 126.8 LBS | OXYGEN SATURATION: 96 %

## 2018-10-02 DIAGNOSIS — R82.90 NONSPECIFIC FINDING ON EXAMINATION OF URINE: ICD-10-CM

## 2018-10-02 DIAGNOSIS — R39.9 URINARY SYMPTOM OR SIGN: Primary | ICD-10-CM

## 2018-10-02 LAB
ALBUMIN UR-MCNC: NEGATIVE MG/DL
APPEARANCE UR: CLEAR
BACTERIA #/AREA URNS HPF: ABNORMAL /HPF
BILIRUB UR QL STRIP: NEGATIVE
COLOR UR AUTO: YELLOW
GLUCOSE UR STRIP-MCNC: NEGATIVE MG/DL
HGB UR QL STRIP: ABNORMAL
KETONES UR STRIP-MCNC: NEGATIVE MG/DL
LEUKOCYTE ESTERASE UR QL STRIP: NEGATIVE
NITRATE UR QL: NEGATIVE
NON-SQ EPI CELLS #/AREA URNS LPF: ABNORMAL /LPF
PH UR STRIP: 5 PH (ref 5–7)
RBC #/AREA URNS AUTO: ABNORMAL /HPF
SOURCE: ABNORMAL
SP GR UR STRIP: 1.02 (ref 1–1.03)
UROBILINOGEN UR STRIP-ACNC: 0.2 EU/DL (ref 0.2–1)
WBC #/AREA URNS AUTO: ABNORMAL /HPF

## 2018-10-02 PROCEDURE — 87086 URINE CULTURE/COLONY COUNT: CPT | Performed by: NURSE PRACTITIONER

## 2018-10-02 PROCEDURE — 87088 URINE BACTERIA CULTURE: CPT | Performed by: NURSE PRACTITIONER

## 2018-10-02 PROCEDURE — 87186 SC STD MICRODIL/AGAR DIL: CPT | Performed by: NURSE PRACTITIONER

## 2018-10-02 PROCEDURE — 81001 URINALYSIS AUTO W/SCOPE: CPT | Performed by: NURSE PRACTITIONER

## 2018-10-02 PROCEDURE — 99213 OFFICE O/P EST LOW 20 MIN: CPT | Performed by: NURSE PRACTITIONER

## 2018-10-02 RX ORDER — SULFAMETHOXAZOLE/TRIMETHOPRIM 800-160 MG
1 TABLET ORAL 2 TIMES DAILY
Qty: 6 TABLET | Refills: 0 | Status: SHIPPED | OUTPATIENT
Start: 2018-10-02 | End: 2019-03-18

## 2018-10-02 RX ORDER — PRENATAL VIT/IRON FUM/FOLIC AC 27MG-0.8MG
1 TABLET ORAL DAILY
Qty: 100 TABLET | Refills: 3 | Status: SHIPPED | OUTPATIENT
Start: 2018-10-02 | End: 2019-09-06

## 2018-10-02 NOTE — MR AVS SNAPSHOT
After Visit Summary   10/2/2018    Robert Anand    MRN: [id number]           Patient Information     Date Of Birth          1990        Visit Information        Provider Department      10/2/2018 8:40 AM Virginia Trent NP; MULTILINGUAL WORD [facility]        Today's Diagnoses     Urinary symptom or sign    -  1    Nonspecific finding on examination of urine        Patient is a currently breast-feeding mother          Care Instructions      Urethritis, Infection vs. Chemical (Adult Female)    You have urethritis. This means an inflammation in your urethra. The urethra is the tube that drains the urine out of your bladder. Urethritis is most often caused by a bacterial infection. The infection may be from gonorrhea, chlamydia, or another sexually transmitted disease (STD). But other things can also cause it. These things include irritation from soap, lotion, deodorant, or spermicides. Hormone changes that happen after menopause can also cause urethritis. The cause of your urethritis is uncertain.  Women with urethritis often don't have symptoms. When symptoms do happen, they can be the same as a urinary tract infection or bladder infection. Symptoms can include:    Burning or pain when urinating    Feeling like you have to urinate often    Pus coming from your vagina    Pressure or pain in your lower abdomen    Pain when you have sex  Urethritis caused by bacteria is treated with antibiotics. Urethritis may clear up in a few weeks or months, even without treatment. But if you don't get treatment, the bacteria that cause the infection can stay in the urethra. Even if symptoms go away, you can still have the infection. And you can spread it to others.  Your sex partner or partners also need to be treated. This is true even if they have no symptoms. You can get infected again if they aren't treated and you have sex with them. Your partner should call his or her healthcare provider  to be looked at and treated.  Home care  Follow these guidelines when caring for yourself at home:    Stop using any soap, lotions, or other chemicals that may cause irritation.    If you were given antibiotics, take them until they are all gone, or until your healthcare provider tells you to stop. It's important to finish the antibiotics even if your symptoms go away. This is to make sure the infection has completely cleared up.    Don't have sex until both you and your partner have finished all of the antibiotics, and your provider tells you that you cannot pass on the infection.    You can take acetaminophen or ibuprofen for pain, unless you were given a different pain medicine to use. If you have chronic liver or kidney disease, talk with your provider before taking these medicines. Also talk with your provider if you've had a stomach ulcer or GI bleeding or are taking blood thinner medicines.    Learn about  safe sex  practices and use them. The safest sex is with a partner who does not have an STD and only has sex with you. Condoms can keep you from getting some STDs. These include gonorrhea, chlamydia and HIV. But condoms are not a guarantee you will not get these diseases.  Follow-up care  Follow up with your healthcare provider, or as advised. If an STD culture was taken, call as directed for the result. If you are diagnosed with an STD, follow up with your provider or your local health department. You should have a complete STD screening, including HIV testing. For more information, call CDC-INFO at 813-854-0998.  When to seek medical advice  Call your healthcare provider right away if any of these occur:    You don't get better after 3 days    Fever of 100.4 F (38 C) or higher, or as directed by your healthcare provider    New pain in your lower abdomen or back    Pain in your lower abdomen or back that gets worse    Repeated vomiting    Vaginal discharge or unexpected vaginal bleeding    Weakness,  "dizziness, or fainting    You can't urinate because of the pain    Rash or joint pain    Painful open sores on the outer vaginal area    Enlarged, painful lumps (lymph nodes) in your groin   Date Last Reviewed: 10/1/2016    4127-4499 [employer]. [address] [place] [address]. All rights reserved. This information is not intended as a substitute for professional medical care. Always follow your healthcare professional's instructions.                Follow-ups after your visit        Follow-up notes from your care team     Return if symptoms worsen or fail to improve.      Who to contact     Normal or non-critical lab and imaging results will be communicated to you by MyChart, letter or phone within 4 business days after the clinic has received the results. If you do not hear from us within 7 days, please contact the clinic through [employer] or phone. If you have a critical or abnormal lab result, we will notify you by phone as soon as possible.  Submit refill requests through [employer] or call your pharmacy and they will forward the refill request to us. Please allow 3 business days for your refill to be completed.          If you need to speak with a  for additional information , please call: [phone]             Additional Information About Your Visit        Care EveryWhere ID     This is your Care EveryWhere ID. This could be used by other organizations to access your [place] medical records  [id number]        Your Vitals Were     Pulse Temperature Respirations Height Last Period Pulse Oximetry    77 98.3  F (36.8  C) (Oral) 16 5' 4.37\" (1.635 m) 09/17/2018 96%    Breastfeeding? BMI (Body Mass Index)                No 21.52 kg/m2           Blood Pressure from Last 3 Encounters:   10/02/18 123/78   07/13/18 122/75   07/03/18 111/75    Weight from Last 3 Encounters:   10/02/18 126 lb 12.8 oz (57.5 kg)   07/13/18 127 lb 6.4 oz (57.8 kg)   07/03/18 125 lb 8 oz (56.9 kg) "              We Performed the Following     UA reflex to Microscopic and Culture     Urine Culture Aerobic Bacterial     Urine Microscopic          Today's Medication Changes          These changes are accurate as of 10/2/18  9:12 AM.  If you have any questions, ask your nurse or doctor.               Start taking these medicines.        Dose/Directions    prenatal multivitamin plus iron 27-0.8 MG Tabs per tablet   Used for:  Patient is a currently breast-feeding mother   Started by:  Virginia Trent NP        Dose:  1 tablet   Take 1 tablet by mouth daily   Quantity:  100 tablet   Refills:  3       sulfamethoxazole-trimethoprim 800-160 MG per tablet   Commonly known as:  BACTRIM DS/SEPTRA DS   Used for:  Urinary symptom or sign   Started by:  Virginia Trent NP        Dose:  1 tablet   Take 1 tablet by mouth 2 times daily for 3 days   Quantity:  6 tablet   Refills:  0            Where to get your medicines      These medications were sent to [facility] - [address] [facility]  [address] [facility] [place] [address]     Phone:  [phone]     prenatal multivitamin plus iron 27-0.8 MG Tabs per tablet    sulfamethoxazole-trimethoprim 800-160 MG per tablet                Primary Care Provider Office Phone # Fax #    [facility] [phone] [address] [facility] [address]        Equal Access to Services     KT FOSTER AH: Hadmando ivan hadasho Soomaali, waaxda luqadaha, qaybta kaalmada adeegyada, waxay ghazal prieto. So [facility] [phone].    ATENCIÓN: Si habla español, tiene a ramos disposición servicios gratuitos de asistencia lingüística. Llame al [phone].    We comply with applicable federal civil rights laws and [place] laws. We do not discriminate on the basis of race, color, national origin, age, disability, sex, sexual orientation, or gender identity.            Thank you!     Thank you for choosing [facility]  for your care. Our goal is always to provide you with excellent care. Hearing back from our patients is one way we can continue to improve our services. Please take a few minutes to complete the written survey that you may receive in the mail after your visit with us. Thank you!             Your Updated Medication List - Protect others around you: Learn how to safely use, store and throw away your medicines at www.disposemymeds.org.          This list is accurate as of 10/2/18  9:12 AM.  Always use your most recent med list.                   Brand Name Dispense Instructions for use Diagnosis    prenatal multivitamin plus iron 27-0.8 MG Tabs per tablet     100 tablet    Take 1 tablet by mouth daily    Patient is a currently breast-feeding mother       sulfamethoxazole-trimethoprim 800-160 MG per tablet    BACTRIM DS/SEPTRA DS    6 tablet    Take 1 tablet by mouth 2 times daily for 3 days    Urinary symptom or sign

## 2018-10-02 NOTE — PROGRESS NOTES
SUBJECTIVE:   Robert Anand is a 28 year old female who presents to clinic today for the following health issues:      Urinary SYMPTOMS     Onset: friday    Description:   Painful urination (Dysuria): YES  Blood in urine (Hematuria): no   Frequency/Urgency: YES  Abdominal/Pelvic/Flank Pain : no     Other vaginal symptoms: none    Progression of Symptoms:  improving    History:   History of frequent UTI's: no   History of kidney stones: no   Sexually Active: YES  New Partner: no     Possibility of pregnancy: No     Therapies Tried and outcome: none      Problem list and histories reviewed & adjusted, as indicated.  Additional history: as documented    Patient Active Problem List   Diagnosis     Female infertility     Male infertility     Irregular menses     Supervision of other normal pregnancy, antepartum     IUGR (intrauterine growth restriction) affecting care of mother, third trimester, fetus 1     Past Surgical History:   Procedure Laterality Date     laproscopy  2013    infertility       Social History   Substance Use Topics     Smoking status: Never Smoker     Smokeless tobacco: Never Used     Alcohol use No     History reviewed. No pertinent family history.      Current Outpatient Prescriptions   Medication Sig Dispense Refill     Prenatal Vit-Fe Fumarate-FA (PRENATAL MULTIVITAMIN PLUS IRON) 27-0.8 MG TABS per tablet Take 1 tablet by mouth daily 100 tablet 3     sulfamethoxazole-trimethoprim (BACTRIM DS/SEPTRA DS) 800-160 MG per tablet Take 1 tablet by mouth 2 times daily for 3 days 6 tablet 0     No Known Allergies  Recent Labs   Lab Test  01/26/17   0941   ALT  23   CR  0.74   GFRESTIMATED  >90  Non  GFR Calc     GFRESTBLACK  >90   GFR Calc     POTASSIUM  4.1   TSH  4.14*      BP Readings from Last 3 Encounters:   10/02/18 123/78   07/13/18 122/75   07/03/18 111/75    Wt Readings from Last 3 Encounters:   10/02/18 126 lb 12.8 oz (57.5 kg)   07/13/18 127 lb 6.4 oz (57.8  "kg)   07/03/18 125 lb 8 oz (56.9 kg)                  Labs reviewed in EPIC    Reviewed and updated as needed this visit by clinical staff  Tobacco  Allergies  Meds  Problems  Med Hx  Surg Hx  Fam Hx  Soc Hx        Reviewed and updated as needed this visit by Provider  Allergies  Problems         ROS:  Constitutional, HEENT, cardiovascular, pulmonary, GI, , musculoskeletal, neuro, skin, endocrine and psych systems are negative, except as otherwise noted.    OBJECTIVE:     /78  Pulse 77  Temp 98.3  F (36.8  C) (Oral)  Resp 16  Ht 5' 4.37\" (1.635 m)  Wt 126 lb 12.8 oz (57.5 kg)  LMP 09/17/2018  SpO2 96%  Breastfeeding? No  BMI 21.52 kg/m2  Body mass index is 21.52 kg/(m^2).  GENERAL: healthy, alert and no distress  RESP: lungs clear to auscultation - no rales, rhonchi or wheezes  CV: regular rate and rhythm, normal S1 S2, no S3 or S4, no murmur, click or rub, no peripheral edema and peripheral pulses strong  ABDOMEN: soft, nontender, no hepatosplenomegaly, no masses and bowel sounds normal, no CVA tenderness  SKIN: no suspicious rashes  PSYCH: mentation appears normal, affect normal/bright    Diagnostic Test Results:  See orders    ASSESSMENT/PLAN:         ICD-10-CM    1. Urinary symptom or sign R39.9 UA reflex to Microscopic and Culture     Urine Microscopic     sulfamethoxazole-trimethoprim (BACTRIM DS/SEPTRA DS) 800-160 MG per tablet   2. Nonspecific finding on examination of urine R82.90 Urine Culture Aerobic Bacterial   3. Patient is a currently breast-feeding mother Z39.1 Prenatal Vit-Fe Fumarate-FA (PRENATAL MULTIVITAMIN PLUS IRON) 27-0.8 MG TABS per tablet       See Patient Instructions: due to recent UTI with return of symptoms, will treat with different ABX.  Breastfeeding- discussed watching baby for any reaction to ABX.     Virginia Trent, GENARO  [facility]  "

## 2018-10-02 NOTE — PROGRESS NOTES
Results discussed directly with patient while patient was present. Any further details documented in the note.   Virginia Trent NP

## 2018-10-02 NOTE — PATIENT INSTRUCTIONS
Urethritis, Infection vs. Chemical (Adult Female)    You have urethritis. This means an inflammation in your urethra. The urethra is the tube that drains the urine out of your bladder. Urethritis is most often caused by a bacterial infection. The infection may be from gonorrhea, chlamydia, or another sexually transmitted disease (STD). But other things can also cause it. These things include irritation from soap, lotion, deodorant, or spermicides. Hormone changes that happen after menopause can also cause urethritis. The cause of your urethritis is uncertain.  Women with urethritis often don't have symptoms. When symptoms do happen, they can be the same as a urinary tract infection or bladder infection. Symptoms can include:    Burning or pain when urinating    Feeling like you have to urinate often    Pus coming from your vagina    Pressure or pain in your lower abdomen    Pain when you have sex  Urethritis caused by bacteria is treated with antibiotics. Urethritis may clear up in a few weeks or months, even without treatment. But if you don't get treatment, the bacteria that cause the infection can stay in the urethra. Even if symptoms go away, you can still have the infection. And you can spread it to others.  Your sex partner or partners also need to be treated. This is true even if they have no symptoms. You can get infected again if they aren't treated and you have sex with them. Your partner should call his or her healthcare provider to be looked at and treated.  Home care  Follow these guidelines when caring for yourself at home:    Stop using any soap, lotions, or other chemicals that may cause irritation.    If you were given antibiotics, take them until they are all gone, or until your healthcare provider tells you to stop. It's important to finish the antibiotics even if your symptoms go away. This is to make sure the infection has completely cleared up.    Don't have sex until both you and your  partner have finished all of the antibiotics, and your provider tells you that you cannot pass on the infection.    You can take acetaminophen or ibuprofen for pain, unless you were given a different pain medicine to use. If you have chronic liver or kidney disease, talk with your provider before taking these medicines. Also talk with your provider if you've had a stomach ulcer or GI bleeding or are taking blood thinner medicines.    Learn about  safe sex  practices and use them. The safest sex is with a partner who does not have an STD and only has sex with you. Condoms can keep you from getting some STDs. These include gonorrhea, chlamydia and HIV. But condoms are not a guarantee you will not get these diseases.  Follow-up care  Follow up with your healthcare provider, or as advised. If an STD culture was taken, call as directed for the result. If you are diagnosed with an STD, follow up with your provider or your local health department. You should have a complete STD screening, including HIV testing. For more information, call CDC-INFO at 586-402-9327.  When to seek medical advice  Call your healthcare provider right away if any of these occur:    You don't get better after 3 days    Fever of 100.4 F (38 C) or higher, or as directed by your healthcare provider    New pain in your lower abdomen or back    Pain in your lower abdomen or back that gets worse    Repeated vomiting    Vaginal discharge or unexpected vaginal bleeding    Weakness, dizziness, or fainting    You can't urinate because of the pain    Rash or joint pain    Painful open sores on the outer vaginal area    Enlarged, painful lumps (lymph nodes) in your groin   Date Last Reviewed: 10/1/2016    0200-5962 [employer]. [address] [place] [address], [place] [address]. All rights reserved. This information is not intended as a substitute for professional medical care. Always follow your healthcare professional's instructions.

## 2018-10-04 LAB
BACTERIA SPEC CULT: ABNORMAL
SPECIMEN SOURCE: ABNORMAL

## 2018-12-20 ENCOUNTER — OFFICE VISIT (OUTPATIENT)
Dept: OBGYN | Facility: CLINIC | Age: 28
End: 2018-12-20
Payer: COMMERCIAL

## 2018-12-20 ENCOUNTER — ANCILLARY PROCEDURE (OUTPATIENT)
Dept: ULTRASOUND IMAGING | Facility: CLINIC | Age: 28
End: 2018-12-20
Attending: OBSTETRICS & GYNECOLOGY
Payer: COMMERCIAL

## 2018-12-20 VITALS
WEIGHT: 122.2 LBS | HEIGHT: 65 IN | TEMPERATURE: 96.8 F | SYSTOLIC BLOOD PRESSURE: 132 MMHG | RESPIRATION RATE: 16 BRPM | HEART RATE: 106 BPM | DIASTOLIC BLOOD PRESSURE: 85 MMHG | BODY MASS INDEX: 20.36 KG/M2

## 2018-12-20 DIAGNOSIS — N92.6 MISSED PERIOD: ICD-10-CM

## 2018-12-20 DIAGNOSIS — N92.6 MISSED MENSES: ICD-10-CM

## 2018-12-20 DIAGNOSIS — N92.6 MISSED PERIOD: Primary | ICD-10-CM

## 2018-12-20 LAB — BETA HCG QUAL IFA URINE: POSITIVE

## 2018-12-20 PROCEDURE — 76801 OB US < 14 WKS SINGLE FETUS: CPT

## 2018-12-20 PROCEDURE — 84703 CHORIONIC GONADOTROPIN ASSAY: CPT | Performed by: OBSTETRICS & GYNECOLOGY

## 2018-12-20 PROCEDURE — 99214 OFFICE O/P EST MOD 30 MIN: CPT | Performed by: OBSTETRICS & GYNECOLOGY

## 2018-12-20 ASSESSMENT — MIFFLIN-ST. JEOR: SCORE: 1281.21

## 2018-12-20 NOTE — NURSING NOTE
If you have any questions regarding your visit, Please contact your care team.    [email or web address] [facility]: [phone]      [facility] CLINIC HOURS TELEPHONE NUMBER   Cephas Agbeh, M.D.    MARLYN Har[place],     DAVIAN Lewis, DAVIAN             Monday-[place]    8:00a.m-4:45 p.m    Tuesday--[place]     8:00a.m-4:45 p.m.    Thursday-[place]    8:00a.m-4:45 p.m.    Friday-[place]    8:00a.m-4:45 p.m    [facility]   [address]   [place] [address] [phone]-Ask for [facility]   Fax [phone]   Nffqoat-[phone]     [facility] Labor and Delivery   [address] [place] [address] [address].   [place], [place] [address] [phone]     [facility]   [address] [facility] [id number] [phone]   [phone]    Urgent Care locations:    [facility]  Monday-Friday   5 pm - 9 pm   Saturday and Sunday    9 am - 5 pm      Monday-Friday    11 pm - 9 pm  Saturday and Sunday   9 am - 5 pm    [phone]     If you need a medication refill, please contact your pharmacy. Please allow 3 business days for your refill to be completed.  As always, Thank you for trusting us with your healthcare needs!

## 2018-12-20 NOTE — PROGRESS NOTES
"Robert is a 28 year old  referred here by jorge for consultation regarding missed menses. LMP of 10/18/18. About 9 weeks. RUTHANN of 19. Here with her 7 month baby and spouse. Occasional breastfeeds.    ROS: Ten point review of systems was reviewed and negative except the above.    Gyne: - abn pap (last pap ), - STD's    History reviewed. No pertinent past medical history.  Past Surgical History:   Procedure Laterality Date     laproscopy      infertility     Patient Active Problem List   Diagnosis     Female infertility     Male infertility     Irregular menses     Supervision of other normal pregnancy, antepartum     IUGR (intrauterine growth restriction) affecting care of mother, third trimester, fetus 1       ALL/Meds: Her medication and allergy histories were reviewed and are documented in their appropriate chart areas.    SH: - tob, - EtOH,     FH: Her family history was reviewed and documented in its appropriate chart area.    PE: /85   Pulse 106   Temp 96.8  F (36  C)   Resp 16   Ht 1.645 m (5' 4.75\")   Wt 55.4 kg (122 lb 3.2 oz)   LMP 10/18/2018   BMI 20.49 kg/m    Body mass index is 20.49 kg/m .  General:  WNWD female, NAD  Alert  Oriented x 3  Gait:  Normal  Skin:  Normal skin turgor  HEENT:  NC/AT, EOMI  Abdomen:  Non-tender, non-distended.  Pelvic exam:  Not performed  Extremities:  No clubbing, no cyanosis and no edema.    Results for orders placed or performed in visit on 18   Beta HCG qual IFA urine - [facility] and [place]   Result Value Ref Range    Beta HCG Qual IFA Urine Positive (A) NEG^Negative          A/P      ICD-10-CM    1. Missed period N92.6 Beta HCG qual IFA urine - [facility] and [place]     US OB < 14 Weeks Single   2. Missed menses N92.6 US OB < 14 Weeks Single     Will wean baby off breastfeeding.   FOB exam in 2 weeks.  25 minutes was spent face to face with the patient today discussing her history, diagnosis, and follow-up plan as noted above.  Over 50% " of the visit was spent in counseling and coordination of care.    Total Visit Time: 30 minutes.        CEPHAS AGBEH, MD.

## 2019-01-07 ENCOUNTER — OFFICE VISIT (OUTPATIENT)
Dept: OBGYN | Facility: CLINIC | Age: 29
End: 2019-01-07
Payer: COMMERCIAL

## 2019-01-07 VITALS
WEIGHT: 117 LBS | TEMPERATURE: 98 F | SYSTOLIC BLOOD PRESSURE: 119 MMHG | DIASTOLIC BLOOD PRESSURE: 73 MMHG | BODY MASS INDEX: 19.62 KG/M2 | HEART RATE: 89 BPM

## 2019-01-07 DIAGNOSIS — Z87.59 HISTORY OF PRIOR PREGNANCY WITH IUGR NEWBORN: ICD-10-CM

## 2019-01-07 DIAGNOSIS — O09.899 SUPERVISION OF OTHER HIGH RISK PREGNANCY, ANTEPARTUM: Primary | ICD-10-CM

## 2019-01-07 LAB
ERYTHROCYTE [DISTWIDTH] IN BLOOD BY AUTOMATED COUNT: 12.1 % (ref 10–15)
HCT VFR BLD AUTO: 36.8 % (ref 35–47)
HGB BLD-MCNC: 12.5 G/DL (ref 11.7–15.7)
MCH RBC QN AUTO: 30.6 PG (ref 26.5–33)
MCHC RBC AUTO-ENTMCNC: 34 G/DL (ref 31.5–36.5)
MCV RBC AUTO: 90 FL (ref 78–100)
PLATELET # BLD AUTO: 238 10E9/L (ref 150–450)
RBC # BLD AUTO: 4.08 10E12/L (ref 3.8–5.2)
WBC # BLD AUTO: 3.5 10E9/L (ref 4–11)

## 2019-01-07 PROCEDURE — 87591 N.GONORRHOEAE DNA AMP PROB: CPT | Performed by: OBSTETRICS & GYNECOLOGY

## 2019-01-07 PROCEDURE — 36415 COLL VENOUS BLD VENIPUNCTURE: CPT | Performed by: OBSTETRICS & GYNECOLOGY

## 2019-01-07 PROCEDURE — 87389 HIV-1 AG W/HIV-1&-2 AB AG IA: CPT | Performed by: OBSTETRICS & GYNECOLOGY

## 2019-01-07 PROCEDURE — G0499 HEPB SCREEN HIGH RISK INDIV: HCPCS | Performed by: OBSTETRICS & GYNECOLOGY

## 2019-01-07 PROCEDURE — 86780 TREPONEMA PALLIDUM: CPT | Performed by: OBSTETRICS & GYNECOLOGY

## 2019-01-07 PROCEDURE — 85027 COMPLETE CBC AUTOMATED: CPT | Performed by: OBSTETRICS & GYNECOLOGY

## 2019-01-07 PROCEDURE — 99207 ZZC FIRST OB VISIT: CPT | Performed by: OBSTETRICS & GYNECOLOGY

## 2019-01-07 PROCEDURE — 86762 RUBELLA ANTIBODY: CPT | Performed by: OBSTETRICS & GYNECOLOGY

## 2019-01-07 PROCEDURE — 86901 BLOOD TYPING SEROLOGIC RH(D): CPT | Performed by: OBSTETRICS & GYNECOLOGY

## 2019-01-07 PROCEDURE — 86900 BLOOD TYPING SEROLOGIC ABO: CPT | Performed by: OBSTETRICS & GYNECOLOGY

## 2019-01-07 PROCEDURE — 87086 URINE CULTURE/COLONY COUNT: CPT | Performed by: OBSTETRICS & GYNECOLOGY

## 2019-01-07 PROCEDURE — 87491 CHLMYD TRACH DNA AMP PROBE: CPT | Performed by: OBSTETRICS & GYNECOLOGY

## 2019-01-07 NOTE — PATIENT INSTRUCTIONS
If you have any questions regarding your visit, Please contact your care team.    [employer] [facility]: [phone]      [facility] CLINIC HOURS TELEPHONE NUMBER   Cephas Agbeh, M.D.    Sharri Monte -     Debbie Montez             Monday-[place]    8:00a.m-4:45 p.m    Tuesday--[place]     8:00a.m-4:45 p.m.    Thursday-[place]    8:00a.m-4:45 p.m.    Friday-[place]    8:00a.m-4:45 p.m    [facility]   [address] [place] [address]   [phone]-Ask for [facility]   Fax [phone]   Kkylyow-[phone]     [facility] Labor and Delivery   [address] [place] [address] [place] [address]   [phone]     [facility]   [address] [facility] [address]   [phone]   [phone]   Urgent Care locations:    [facility]  Monday-Friday   5 pm - 9 pm   Saturday and Sunday    9 am - 5 pm      Monday-Friday    11 pm - 9 pm  Saturday and Sunday   9 am - 5 pm    [phone]     If you need a medication refill, please contact your pharmacy. Please allow 3 business days for your refill to be completed.  As always, Thank you for trusting us with your healthcare needs!

## 2019-01-07 NOTE — PROGRESS NOTES
Robert is a 28 year old  @  11w4d weeks here for new ob visit.    Results for orders placed or performed in visit on 18   US OB < 14 Weeks Single    Narrative    ULTRASOUND OBSTETRIC LESS THAN 14 WEEKS SINGLE  2018 3:40 PM    HISTORY: Dating.    TECHNIQUE: Transabdominal imaging only was performed.    COMPARISON: None.    FINDINGS:      Estimated gestational age by current ultrasound measurement: 9 weeks 1  day.  Estimated date of delivery based on this ultrasound: 2019.  Patient reported LMP: 10/18/2018.  Estimated gestational age by reported LMP: 9 weeks.    Crown-rump length: 2.3 cm.   Embryonic cardiac activity: 170 bpm.   Yolk sac: Present.  Subchorionic hemorrhage: Small area of subchorionic hemorrhage  measures 1.2 x 0.8 x 0.7 cm.    Right ovary: Not visualized.  Left ovary: Contains a small probable corpus luteum cyst.  Adnexal mass: None.  Free pelvic fluid: None.      Impression    IMPRESSION:   1. Single live intrauterine pregnancy of estimated gestational age 9  weeks 1 day.  2. Small subchorionic hemorrhage.  3. Nonvisualization of the right ovary.    CANDACE MCDONALD MD       See Ob questionnaire for pertinent components of HPI.  Current Issues include: none    Obstetric History       T2      L2     SAB0   TAB0   Ectopic0   Multiple0   Live Births2       # Outcome Date GA Lbr Nabeel/2nd Weight Sex Delivery Anes PTL Lv   3 Current            2 Term 18 38w4d  2.863 kg (6 lb 5 oz) F   N HENRY      Apgar1:  8                Apgar5: 9   1 Term 14   3.8 kg (8 lb 6 oz) M Vag-Spont   HENRY          Gyne: Pap smears Normal  History reviewed. No pertinent past medical history.  Past Surgical History:   Procedure Laterality Date     laproscopy      infertility     Patient Active Problem List    Diagnosis Date Noted     IUGR (intrauterine growth restriction) affecting care of mother, third trimester, fetus 1 2018     Priority: Medium     Supervision of  other normal pregnancy, antepartum 2017     Priority: Medium     Female infertility 2017     Priority: Medium     Male infertility 2017     Priority: Medium     Irregular menses 2017     Priority: Medium      No Known Allergies  Current Outpatient Medications   Medication Sig Dispense Refill     Prenatal Vit-Fe Fumarate-FA (PRENATAL MULTIVITAMIN PLUS IRON) 27-0.8 MG TABS per tablet Take 1 tablet by mouth daily 100 tablet 3       Past Medical History of Father of Baby:   No significant medical history    Physical Exam: /73 (BP Location: Left arm, Patient Position: Chair, Cuff Size: Adult Regular)   Pulse 89   Temp 98  F (36.7  C) (Oral)   Wt 53.1 kg (117 lb)   LMP 10/18/2018   Breastfeeding? Yes   BMI 19.62 kg/m    General: Well developed, well nourished female  Skin: Normal  HEENT: Normal  Neck: Supple,no adenopathy,thyroid normal  Chest: Clear  Heart: Regular rate, rhythm,No murmur, rub, gallop  Breasts: No masses, skin, nipple or axillary changes   Abdomen: Benign,Soft, flat, non-tender,No masses, organomegaly,No inguinal nodes,Bowel sounds normoactive   Extremities: Normal  Neurological: Normal   Perineum: Intact   Vulva: Normal  Vagina: Normal mucosa, no discharge  Cervix: Parous, closed, mobile, no discharge  Uterus: 11 weeks, Normal shape, position and consistency   Adnexa: Normal  Rectum: deferred, Normal without lesion or mass   Bony Pelvis: Adequate       A/P 28 year old  at  11w4d weeks    ICD-10-CM    1. Supervision of other high risk pregnancy, antepartum O09.899 ABO and Rh     CBC with platelets     OB hemoglobin     Rubella Antibody IgG Quantitative     Treponema Abs w Reflex to RPR and Titer     Hepatitis B surface antigen     HIV Antigen Antibody Combo     Chlamydia trachomatis PCR     Neisseria gonorrhoeae PCR     Urine Culture Aerobic Bacterial   2. History of prior pregnancy with IUGR  Z87.59          - Discussed physician coverage, tertiary  support, diet, exercise, weight gain, schedule of visits, routine and indicated ultrasounds, and childbirth education.    - Options for  testing for chromosomal and birth defects were discussed with the patient.  Diagnostic tests include CVS and Amniocentesis.  We discussed that these tests are definitive but invasive and do carry a risk of fetal loss.    Screening tests include nuchal translucency/blood marker testing in the first trimester and quad screening in the second trimester.  We discussed that these are screening tests and not diagnostic tests and that false positives and negatives are a distinct possibility    return to clinic in 4-6 weeks.    CEPHAS AGBEH, MD.

## 2019-01-08 LAB
ABO + RH BLD: NORMAL
ABO + RH BLD: NORMAL
BACTERIA SPEC CULT: NO GROWTH
C TRACH DNA SPEC QL NAA+PROBE: NEGATIVE
HBV SURFACE AG SERPL QL IA: NONREACTIVE
HIV 1+2 AB+HIV1 P24 AG SERPL QL IA: NONREACTIVE
N GONORRHOEA DNA SPEC QL NAA+PROBE: NEGATIVE
SPECIMEN EXP DATE BLD: NORMAL
SPECIMEN SOURCE: NORMAL

## 2019-01-09 LAB
RUBV IGG SERPL IA-ACNC: 230 IU/ML
T PALLIDUM AB SER QL: NONREACTIVE

## 2019-02-15 ENCOUNTER — PRENATAL OFFICE VISIT (OUTPATIENT)
Dept: OBGYN | Facility: CLINIC | Age: 29
End: 2019-02-15
Payer: COMMERCIAL

## 2019-02-15 VITALS
DIASTOLIC BLOOD PRESSURE: 77 MMHG | SYSTOLIC BLOOD PRESSURE: 124 MMHG | OXYGEN SATURATION: 98 % | WEIGHT: 119 LBS | HEART RATE: 101 BPM | BODY MASS INDEX: 19.96 KG/M2

## 2019-02-15 DIAGNOSIS — Z87.59 HISTORY OF PRIOR PREGNANCY WITH IUGR NEWBORN: ICD-10-CM

## 2019-02-15 DIAGNOSIS — O09.899 SUPERVISION OF OTHER HIGH RISK PREGNANCY, ANTEPARTUM: Primary | ICD-10-CM

## 2019-02-15 PROCEDURE — 99207 ZZC PRENATAL VISIT: CPT | Performed by: OBSTETRICS & GYNECOLOGY

## 2019-02-15 NOTE — PATIENT INSTRUCTIONS
If you have any questions regarding your visit, Please contact your care team.    [employer] [facility]: [phone]      [facility] CLINIC HOURS TELEPHONE NUMBER   Cephas Agbeh, M.D.    Laurita Monte -         Monday-[facility]  8:00 am - 5 pm  Tuesday- [facility]  8:00am- 5 pm  Wednesday- Off  Thursday- [facility]  8:00 am- 5 pm  Friday-[place]  8:00 am 5 pm [facility]  [address]  [place] [address] [phone] ask for [facility]    Imaging [phone]    [facility]  [address]  [place] [address] [phone]  Imaging [phone]     Urgent Care locations:    [facility] Saturday and Sunday   9 am - 5 pm    Monday-Friday   12 pm - 8 pm  Saturday and Sunday   9 am - 5 pm   [phone]       If you need a medication refill, please contact your pharmacy. Please allow 3 business days for your refill to be completed.  As always, Thank you for trusting us with your healthcare needs!

## 2019-02-15 NOTE — PROGRESS NOTES
17w1d Eating well.Discussed genetic screening. RTC @ 20 wk.  Declined quad screen. Plan for 20wk US. Visa letter provided for her mom. return to clinic in 4 weeks    ICD-10-CM    1. Supervision of other high risk pregnancy, antepartum O09.899 US OB > 14 Weeks   2. History of prior pregnancy with IUGR  Z87.59 US OB > 14 Weeks     CEPHAS AGBEH, MD.

## 2019-02-15 NOTE — LETTER
[facility]  [address]  [place] [address]  Phone: [phone]    02/15/19    Robert Anand  [address]   [place] [facility] [place] [address]          February 15, 2019    RE: Robert Anand,  1990    To  Department of State, [place] of Consular Affairs:    Robert Anand is currently under our care for a confirmed pregnancy. Her estimated due date is 2019.     Robert Anand would benefit from the personal and  assistance of her mother, Yolanda Weir ( 1972), in the immediate postpartum period. Her mother desires a visa to travel from [place] to the [place] in early  for this purpose. Please take this information into consideration.    I hope this information is sufficient for your needs.  If you have any additional questions regarding this matter please contact our office.  Thank you.    Sincerely,        CEPHAS AGBEH, MD.

## 2019-03-18 ENCOUNTER — PRENATAL OFFICE VISIT (OUTPATIENT)
Dept: OBGYN | Facility: CLINIC | Age: 29
End: 2019-03-18
Payer: COMMERCIAL

## 2019-03-18 ENCOUNTER — ANCILLARY PROCEDURE (OUTPATIENT)
Dept: ULTRASOUND IMAGING | Facility: CLINIC | Age: 29
End: 2019-03-18
Attending: OBSTETRICS & GYNECOLOGY
Payer: COMMERCIAL

## 2019-03-18 VITALS
OXYGEN SATURATION: 97 % | BODY MASS INDEX: 20.79 KG/M2 | DIASTOLIC BLOOD PRESSURE: 68 MMHG | WEIGHT: 124 LBS | HEART RATE: 92 BPM | SYSTOLIC BLOOD PRESSURE: 116 MMHG

## 2019-03-18 DIAGNOSIS — K59.04 CHRONIC IDIOPATHIC CONSTIPATION: ICD-10-CM

## 2019-03-18 DIAGNOSIS — O09.899 SUPERVISION OF OTHER HIGH RISK PREGNANCY, ANTEPARTUM: ICD-10-CM

## 2019-03-18 DIAGNOSIS — Z34.80 SUPERVISION OF OTHER NORMAL PREGNANCY, ANTEPARTUM: Primary | ICD-10-CM

## 2019-03-18 DIAGNOSIS — Z87.59 HISTORY OF PRIOR PREGNANCY WITH IUGR NEWBORN: ICD-10-CM

## 2019-03-18 PROCEDURE — 76805 OB US >/= 14 WKS SNGL FETUS: CPT

## 2019-03-18 PROCEDURE — 99207 ZZC PRENATAL VISIT: CPT | Performed by: OBSTETRICS & GYNECOLOGY

## 2019-03-18 RX ORDER — DOCUSATE SODIUM 100 MG/1
100 CAPSULE, LIQUID FILLED ORAL 2 TIMES DAILY
Qty: 100 CAPSULE | Refills: 1 | Status: SHIPPED | OUTPATIENT
Start: 2019-03-18 | End: 2019-09-06

## 2019-03-18 NOTE — PROGRESS NOTES
21w4d  Doing well except for constipation.  Routine anticipatory guidance.  US tonight.  RTC 4wk. . Plan for  GCT, Hgb at next visit.    ICD-10-CM    1. Supervision of other normal pregnancy, antepartum Z34.80 Glucose tolerance gest screen 1 hour     OB hemoglobin     docusate sodium (COLACE) 100 MG capsule   2. Chronic idiopathic constipation K59.04      CEPHAS AGBEH, MD.

## 2019-03-18 NOTE — PATIENT INSTRUCTIONS
If you have any questions regarding your visit, Please contact your care team.    [employer] [facility]: [phone]      [facility] CLINIC HOURS TELEPHONE NUMBER   Cephas Agbeh, M.D.    Debbie Monte -         Monday-[facility]  8:00 am - 5 pm  Tuesday- [facility]  8:00am- 5 pm  Wednesday- Off  Thursday- [facility]  8:00 am- 5 pm  Friday-[place]  8:00 am 5 pm [facility]  [address]  [place], [place] [address] [phone] ask for [facility]    Imaging [phone]    [facility]  [address]  [place] [address] [phone]  Imaging [phone]     Urgent Care locations:    [facility] Saturday and Sunday   9 am - 5 pm    Monday-Friday   12 pm - 8 pm  Saturday and Sunday   9 am - 5 pm   [phone]       If you need a medication refill, please contact your pharmacy. Please allow 3 business days for your refill to be completed.  As always, Thank you for trusting us with your healthcare needs!

## 2019-03-19 ENCOUNTER — TELEPHONE (OUTPATIENT)
Dept: OBGYN | Facility: CLINIC | Age: 29
End: 2019-03-19

## 2019-03-19 NOTE — TELEPHONE ENCOUNTER
[facility]-  referral filed out and faxed to [facility] along with patient's prenatal chart.  [facility] will contact patient within 3 business days to schedule.

## 2019-03-21 ENCOUNTER — TRANSFERRED RECORDS (OUTPATIENT)
Dept: HEALTH INFORMATION MANAGEMENT | Facility: CLINIC | Age: 29
End: 2019-03-21

## 2019-04-18 ENCOUNTER — PRENATAL OFFICE VISIT (OUTPATIENT)
Dept: OBGYN | Facility: CLINIC | Age: 29
End: 2019-04-18
Payer: COMMERCIAL

## 2019-04-18 VITALS
OXYGEN SATURATION: 100 % | BODY MASS INDEX: 21.63 KG/M2 | SYSTOLIC BLOOD PRESSURE: 120 MMHG | DIASTOLIC BLOOD PRESSURE: 70 MMHG | WEIGHT: 129 LBS | HEART RATE: 75 BPM

## 2019-04-18 DIAGNOSIS — Z34.80 SUPERVISION OF OTHER NORMAL PREGNANCY, ANTEPARTUM: ICD-10-CM

## 2019-04-18 DIAGNOSIS — O35.EXX0 PYELECTASIS OF FETUS ON PRENATAL ULTRASOUND: Primary | ICD-10-CM

## 2019-04-18 LAB
GLUCOSE 1H P 50 G GLC PO SERPL-MCNC: 85 MG/DL (ref 60–129)
HGB BLD-MCNC: 12.2 G/DL (ref 11.7–15.7)

## 2019-04-18 PROCEDURE — 99207 ZZC PRENATAL VISIT: CPT | Performed by: OBSTETRICS & GYNECOLOGY

## 2019-04-18 PROCEDURE — 00000218 ZZHCL STATISTIC OBHBG - HEMOGLOBIN: Performed by: OBSTETRICS & GYNECOLOGY

## 2019-04-18 PROCEDURE — 82950 GLUCOSE TEST: CPT | Performed by: OBSTETRICS & GYNECOLOGY

## 2019-04-18 PROCEDURE — 36415 COLL VENOUS BLD VENIPUNCTURE: CPT | Performed by: OBSTETRICS & GYNECOLOGY

## 2019-04-18 NOTE — PATIENT INSTRUCTIONS
If you have any questions regarding your visit, Please contact your care team.    [employer] [facility]: [phone]      [facility] CLINIC HOURS TELEPHONE NUMBER   Cephas Agbeh, M.D.    Ryann Monte -         Monday-[facility]  8:00 am - 5 pm  Tuesday- [facility]  8:00am- 5 pm  Wednesday- Off  Thursday- [facility]  8:00 am- 5 pm  Friday-[place]  8:00 am 5 pm [facility]  [address]  [place], [place] [address] [phone] ask for [facility]    Imaging [phone]    [facility]  [address]  [place] [address] [phone]  Imaging [phone]     Urgent Care locations:    [facility] Saturday and Sunday   9 am - 5 pm    Monday-Friday   12 pm - 8 pm  Saturday and Sunday   9 am - 5 pm   [phone]       If you need a medication refill, please contact your pharmacy. Please allow 3 business days for your refill to be completed.  As always, Thank you for trusting us with your healthcare needs!

## 2019-04-18 NOTE — LETTER
[facility]  [address]  [place] [address]  Phone: [phone]    04/19/19    Robert Anand  [address]   [place] [address]            Ms. Anand,     All of your labs were normal for you.       We can discuss further at next prenatal visit.    Please contact the clinic if you have additional questions.  Thank you.     Sincerely,     Cephas Mawuena Agbeh           Component Value Flag Ref Range Units Status Collected Lab   Hemoglobin 12.2   11.7 - 15.7 g/dL Final 04/18/2019  4:25 PM BE       Glu Gest Screen 1hr 50g 85   60 - 129 mg/dL Final 04/18/2019  4:25 PM 59

## 2019-04-18 NOTE — PROGRESS NOTES
26w0d  Doing well without issues/concerns.  Routine anticipatory guidance.  MFM U/S as bellow. RTC 4wk.  Glucola given. Plan for  GCT, Hgb.    MFM Impression  =========    1) Intrauterine pregnancy at 22 0/7 weeks gestational age.  2) Bilateral fetal pyelectasis (UTD A2-3) is seen on today's US. Otherwise, none of the anomalies commonly detected by ultrasound were evident in the detailed fetal  anatomic survey described above.  3) Growth parameters and estimated fetal weight were consistent with an appropriate for gestation age pattern of growth.  4) The amniotic fluid volume appeared normal.    Recommendation  ==============    We discussed the findings on today's ultrasound with the patient.    We reviewed that bilateral fetal pyelectasis was seen on US today. We reviewed the possible etiologies, including benign ureteral folds, mild reflux uropathy and mild  obstructive uropathy. We also reviewed the possible in-utero outcomes, including resolution, stabilization and progression. We discussed that the former two outcomes are  the most frequent, with progression the least likely outcome in-utero. The patient is beyond the gestational age for risk modification for fetal aneuploidy and the couple have  declined all aneuploidy screening and diagnostic testing.    Given bilateral pyelectasis, serial US to monitor fetal  tract are recommended, the next of which has been scheduled here in 5 weeks.    Return to primary provider for continued prenatal care.    Thank you for the opportunity to participate in the care of this patient. If you have questions regarding today's evaluation or if we can be of further service, please contact the  [facility].    **Fetal anomalies may be present but not detected**.    REPORT SIGNED BY: KEMAL GAINES MD    ICD-10-CM    1. Pyelectasis of fetus on prenatal ultrasound O35.8XX0    2. Supervision of other normal pregnancy, antepartum Z34.80 OB hemoglobin      Glucose tolerance gest screen 1 hour     CEPHAS AGBEH, MD.

## 2019-04-25 ENCOUNTER — TRANSFERRED RECORDS (OUTPATIENT)
Dept: HEALTH INFORMATION MANAGEMENT | Facility: CLINIC | Age: 29
End: 2019-04-25

## 2019-05-16 ENCOUNTER — PRENATAL OFFICE VISIT (OUTPATIENT)
Dept: OBGYN | Facility: CLINIC | Age: 29
End: 2019-05-16
Payer: COMMERCIAL

## 2019-05-16 VITALS
SYSTOLIC BLOOD PRESSURE: 107 MMHG | TEMPERATURE: 98.5 F | HEART RATE: 97 BPM | WEIGHT: 133 LBS | BODY MASS INDEX: 22.3 KG/M2 | DIASTOLIC BLOOD PRESSURE: 73 MMHG

## 2019-05-16 DIAGNOSIS — O35.EXX0 PYELECTASIS OF FETUS ON PRENATAL ULTRASOUND: Primary | ICD-10-CM

## 2019-05-16 DIAGNOSIS — Z23 NEED FOR TDAP VACCINATION: ICD-10-CM

## 2019-05-16 PROCEDURE — 99207 ZZC PRENATAL VISIT: CPT | Performed by: OBSTETRICS & GYNECOLOGY

## 2019-05-16 PROCEDURE — 90471 IMMUNIZATION ADMIN: CPT | Performed by: OBSTETRICS & GYNECOLOGY

## 2019-05-16 PROCEDURE — 90715 TDAP VACCINE 7 YRS/> IM: CPT | Performed by: OBSTETRICS & GYNECOLOGY

## 2019-05-16 NOTE — PROGRESS NOTES
30w0d.  Tired.  No HA, visual changes, N/V etc. MFM U/S and F/U as bellow. RTC 2 wk/prn.  Indication  ========    Bilateral ptyalectasis and incomplete fetal survey on outside ultrasound.    Method  ======    Transabdominal ultrasound examination. View: Sufficient.    Pregnancy  =========    Thomas pregnancy. Number of fetuses: 1.    Dating  ======                                             Date                                Details                                                                                      Gest. age                      RUTHANN  LMP                                  10/18/2018                                                                                                                       22 w + 0 d                     7/25/2019  External assessment          12/20/2018                       GA: 9 w + 1 d                                                                            22 w + 1 d                     7/24/2019  U/S                                   3/21/2019                         based upon AC, BPD, Femur, HC                                                22 w + 2 d                     7/23/2019  Assigned dating                  Dating performed on 03/21/2019, based on the LMP                                                            22 w + 0 d                     7/25/2019    General Evaluation  ==============    Cardiac activity: present.  bpm.  Fetal movements: present.  Presentation: breech.  Placenta:  posterior, There is no evidence of placenta previa.  Umbilical cord: 3 vessel cord, placental insertion: normal.  Amniotic fluid: Amount of AF: normal. MVP 4.9 cm.    Fetal Biometry  ============    Main Fetal Biometry:  BPD                                   51.8            mm                                         21w 5d                               Hadlock  OFD                                   73.6            mm                                         22w  5d                               Nicolaides  HC                                      201.8          mm                                        22w 2d                               Hadlock  AC                                      174.5          mm                                        22w 3d                               Hadlock  Femur                                 39.0            mm                                        22w 4d                               Hadlock  Cerebellum tr                       23.8            mm                                        21w 6d                               Nicolaides  CM                                     5.9              mm                                                                                   Humerus                             37.0            mm                                         22w 6d                              Enrique  Fetal Weight Calculation:  EFW                                   501             g                   47%                                                           Rk  EFW (lb,oz)                         1 lb 2          oz  Calculated by                            Hadlock (BPD-HC-AC-FL)  Head / Face / Neck Biometry:                                        4.5              mm                                          Nasal bone                          7.7              mm                                                                                   Urinary Tract Biometry:  Rt Tejas.          7.5  Pelvis                       mm    Lt Tejas.           7.8  Pelvis                       mm    Amniotic Fluid / FHR:  AF MVP                              4.9             cm                                                                                     FHR                                    150             bpm                                           Fetal Anatomy  ===========    The following structures appear  abnormal:  Abdomen                             Right kidney: There is moderate dilation of the renal pelvis with dilation of the calyces. Parenchyma is of normal echogenicity and thickness.                                             (UTD A2-3: Increased Risk). Left kidney: There is moderate dilation of the renal pelvis with dilation of the calyces. Parenchyma is of normal                                             echogenicity and thickness. (UTD A2-3: Increased Risk).    The following structures appear normal:  Head / Neck                         Cranium. Head size. Head shape. Lateral ventricles. Choroid plexus. Midline falx. Cavum septi pellucidi. Cerebellum. Cisterna magna.                                             Parenchyma. Thalami. Vermis.                                             Neck. Nuchal fold.  Face                                   Lips. Profile. Nose. Maxilla. Mandible. Orbits. Lens.  Heart / Thorax                      4-chamber view. RVOT. LVOT. Situs. Aortic arch. Bicaval view. Ductal arch. Superior vena cava. Inferior vena cava. 3-vessel view.                                             3-vessel-trachea view. Cardiac position. Cardiac size. Cardiac rhythm.                                             Right lung. Left lung. Diaphragm.  Abdomen                             Abdominal wall. Cord insertion. Stomach. Bladder. Liver. Bowel. Genitals.  Spine / Skelet.                     Cervical spine. Thoracic spine. Lumbar spine. Sacral spine.  Extremities                          Right hand. Left hand. Right foot. Left foot.    Gender: male.    Maternal Structures  ===============    Cervix                                  Visualized, Appears Closed.                                             Cervical length 34.8 mm.  Right Ovary                          Visualized.  Left Ovary                            Visualized.    Impression  =========    1) Intrauterine pregnancy at 22 0/7 weeks  gestational age.  2) Bilateral fetal pyelectasis (UTD A2-3) is seen on today's US. Otherwise, none of the anomalies commonly detected by ultrasound were evident in the detailed fetal  anatomic survey described above.  3) Growth parameters and estimated fetal weight were consistent with an appropriate for gestation age pattern of growth.  4) The amniotic fluid volume appeared normal.    Recommendation  ==============    We discussed the findings on today's ultrasound with the patient.    We reviewed that bilateral fetal pyelectasis was seen on US today. We reviewed the possible etiologies, including benign ureteral folds, mild reflux uropathy and mild  obstructive uropathy. We also reviewed the possible in-utero outcomes, including resolution, stabilization and progression. We discussed that the former two outcomes are  the most frequent, with progression the least likely outcome in-utero. The patient is beyond the gestational age for risk modification for fetal aneuploidy and the couple have  declined all aneuploidy screening and diagnostic testing.    Given bilateral pyelectasis, serial US to monitor fetal  tract are recommended, the next of which has been scheduled here in 5 weeks.    Return to primary provider for continued prenatal care.    Thank you for the opportunity to participate in the care of this patient. If you have questions regarding today's evaluation or if we can be of further service, please contact the  [facility].    **Fetal anomalies may be present but not detected**.    REPORT SIGNED BY: KEMAL GAINES MD   Other Result Information   Interface, Nmhcradordrslt In - 03/21/2019  5:26 PM CDT  IMPRESSION  Indication  ========    Bilateral ptyalectasis and incomplete fetal survey on outside ultrasound.    Method  ======    Transabdominal ultrasound examination. View: Sufficient.    Pregnancy  =========    Thomas pregnancy. Number of fetuses: 1.    Dating  ======                                              Date                                Details                                                                                      Gest. age                      RUTHANN  LMP                                  10/18/2018                                            22 w + 0 d                     7/25/2019  External assessment          12/20/2018                       GA: 9 w + 1 d                                                                            22 w + 1 d                     7/24/2019  U/S                                   3/21/2019                         based upon AC, BPD, Femur, HC                                                22 w + 2 d                     7/23/2019  Assigned dating                  Dating performed on 03/21/2019, based on the LMP                                                            22 w + 0 d                     7/25/2019    General Evaluation  ==============    Cardiac activity: present.  bpm.  Fetal movements: present.  Presentation: breech.  Placenta:  posterior, There is no evidence of placenta previa.  Umbilical cord: 3 vessel cord, placental insertion: normal.  Amniotic fluid: Amount of AF: normal. MVP 4.9 cm.    Fetal Biometry  ============    Main Fetal Biometry:  BPD                                   51.8            mm                                         21w 5d                               Hadlock  OFD                                   73.6            mm                                         22w 5d                               Nicolaides  HC                                      201.8          mm                                        22w 2d                               Hadlock  AC                                      174.5          mm                                        22w 3d                               Hadlock  Femur                                 39.0            mm                                        22w 4d                                Melina  Cerebellum tr                       23.8            mm                                        21w 6d                               Nicolaides  CM                                     5.9              mm                   Humerus                             37.0            mm                                         22w 6d                              Enrique  Fetal Weight Calculation:  EFW                                   501             g                   47%                                                           Rk  EFW (lb,oz)                         1 lb 2          oz  Calculated by                            Melina (BPD-HC-AC-FL)  Head / Face / Neck Biometry:                                        4.5              mm                  Nasal bone                          7.7              mm                      Urinary Tract Biometry:  Rt Tejas.          7.5  Pelvis                       mm    Lt Tejas.           7.8  Pelvis                       mm    Amniotic Fluid / FHR:  AF MVP                              4.9             cm                       FHR                                    150             bpm                     Fetal Anatomy  ===========    The following structures appear abnormal:  Abdomen                             Right kidney: There is moderate dilation of the renal pelvis with dilation of the calyces. Parenchyma is of normal echogenicity and thickness.                                             (UTD A2-3: Increased Risk). Left kidney: There is moderate dilation of the renal pelvis with dilation of the calyces. Parenchyma is of normal                                             echogenicity and thickness. (UTD A2-3: Increased Risk).    The following structures appear normal:  Head / Neck                         Cranium. Head size. Head shape. Lateral ventricles. Choroid plexus. Midline falx. Cavum septi pellucidi. Cerebellum. Cisterna magna.                                              Parenchyma. Thalami. Vermis.                                             Neck. Nuchal fold.  Face                                   Lips. Profile. Nose. Maxilla. Mandible. Orbits. Lens.  Heart / Thorax                      4-chamber view. RVOT. LVOT. Situs. Aortic arch. Bicaval view. Ductal arch. Superior vena cava. Inferior vena cava. 3-vessel view.                                             3-vessel-trachea view. Cardiac position. Cardiac size. Cardiac rhythm.                                             Right lung. Left lung. Diaphragm.  Abdomen                             Abdominal wall. Cord insertion. Stomach. Bladder. Liver. Bowel. Genitals.  Spine / Skelet.                     Cervical spine. Thoracic spine. Lumbar spine. Sacral spine.  Extremities                          Right hand. Left hand. Right foot. Left foot.    Gender: male.    Maternal Structures  ===============    Cervix                                  Visualized, Appears Closed.                                             Cervical length 34.8 mm.  Right Ovary                          Visualized.  Left Ovary                            Visualized.    Impression  =========    1) Intrauterine pregnancy at 22 0/7 weeks gestational age.  2) Bilateral fetal pyelectasis (UTD A2-3) is seen on today's US. Otherwise, none of the anomalies commonly detected by ultrasound were evident in the detailed fetal  anatomic survey described above.  3) Growth parameters and estimated fetal weight were consistent with an appropriate for gestation age pattern of growth.  4) The amniotic fluid volume appeared normal.    Recommendation  ==============    We discussed the findings on today's ultrasound with the patient.    We reviewed that bilateral fetal pyelectasis was seen on US today. We reviewed the possible etiologies, including benign ureteral folds, mild reflux uropathy and mild  obstructive uropathy. We also reviewed the possible  in-utero outcomes, including resolution, stabilization and progression. We discussed that the former two outcomes are  the most frequent, with progression the least likely outcome in-utero. The patient is beyond the gestational age for risk modification for fetal aneuploidy and the couple have  declined all aneuploidy screening and diagnostic testing.    Given bilateral pyelectasis, serial US to monitor fetal  tract are recommended, the next of which has been scheduled here in 5 weeks.    Return to primary provider for continued prenatal care.    Thank you for the opportunity to participate in the care of this patient. If you have questions regarding today's evaluation or if we can be of further service, please contact the  [facility].    **Fetal anomalies may be present but not detected**.    REPORT SIGNED BY: KEMAL GAINES MD       ICD-10-CM    1. Pyelectasis of fetus on prenatal ultrasound O35.8XX0      CEPHAS AGBEH, MD.

## 2019-05-16 NOTE — NURSING NOTE
Screening Questionnaire for Adult Immunization    Are you sick today?   No   Do you have allergies to medications, food, a vaccine component or latex?   No   Have you ever had a serious reaction after receiving a vaccination?   No   Do you have a long-term health problem with heart disease, lung disease, asthma, kidney disease, metabolic disease (e.g. diabetes), anemia, or other blood disorder?   No   Do you have cancer, leukemia, HIV/AIDS, or any other immune system problem?   No   In the past 3 months, have you taken medications that affect  your immune system, such as prednisone, other steroids, or anticancer drugs; drugs for the treatment of rheumatoid arthritis, Crohn s disease, or psoriasis; or have you had radiation treatments?   No   Have you had a seizure, or a brain or other nervous system problem?   No   During the past year, have you received a transfusion of blood or blood     products, or been given immune (gamma) globulin or antiviral drug?   No   For women: Are you pregnant or is there a chance you could become        pregnant during the next month?   Yes   Have you received any vaccinations in the past 4 weeks?   No     Immunization questionnaire Established pregnancy patient.        Per orders of Dr. Agbeh, injection of Tdap given by Anna Marie Lund. Patient instructed to remain in clinic for 15 minutes afterwards, and to report any adverse reaction to me immediately.       Screening performed by Anna Marie Lund on 5/16/2019 at 5:03 PM.

## 2019-05-16 NOTE — PATIENT INSTRUCTIONS
If you have any questions regarding your visit, Please contact your care team.  [facility]: [phone]  [facility] CLINIC HOURS TELEPHONE NUMBER   Cephas Agbeh, M.D. Lisa -       Radha Deluna         Monday-Sam    8:00a.m-4:45 p.m    Tuesday--[place]     8:00a.m-4:45 p.m.    Thursday-Sam    8:00a.m-4:45 p.m.    Friday-Sam    8:00a.m-4:45 p.m    [facility]   [address] [place] [address]   [phone]-Ask for [facility]   Fax [phone]   Mtaaqim-[phone]     [facility] Labor and Delivery   [address] [place] [address] [place] [address]   [phone]    [facility]  [address] [facility] [address]  [phone]  [phone]   Urgent Care locations:    [facility] Monday-Friday  5 pm - 9 pm  Saturday and Sunday   9 am - 5 pm   Monday-Friday   5 pm - 9 pm  Saturday and Sunday  9 am - 5 pm    [phone]   If you need a medication refill, please contact your pharmacy. Please allow 3 business days for your refill to be completed.  As always, Thank you for trusting us with your healthcare needs!

## 2019-05-30 ENCOUNTER — TRANSFERRED RECORDS (OUTPATIENT)
Dept: HEALTH INFORMATION MANAGEMENT | Facility: CLINIC | Age: 29
End: 2019-05-30

## 2019-05-31 ENCOUNTER — PRENATAL OFFICE VISIT (OUTPATIENT)
Dept: OBGYN | Facility: CLINIC | Age: 29
End: 2019-05-31
Payer: COMMERCIAL

## 2019-05-31 VITALS
OXYGEN SATURATION: 98 % | SYSTOLIC BLOOD PRESSURE: 113 MMHG | HEART RATE: 90 BPM | BODY MASS INDEX: 22.34 KG/M2 | WEIGHT: 133.2 LBS | DIASTOLIC BLOOD PRESSURE: 65 MMHG

## 2019-05-31 DIAGNOSIS — O36.5931 IUGR (INTRAUTERINE GROWTH RESTRICTION) AFFECTING CARE OF MOTHER, THIRD TRIMESTER, FETUS 1: ICD-10-CM

## 2019-05-31 DIAGNOSIS — O35.EXX0 PYELECTASIS OF FETUS ON PRENATAL ULTRASOUND: Primary | ICD-10-CM

## 2019-05-31 PROCEDURE — 99207 ZZC PRENATAL VISIT: CPT | Performed by: OBSTETRICS & GYNECOLOGY

## 2019-05-31 PROCEDURE — 59425 ANTEPARTUM CARE ONLY: CPT | Performed by: OBSTETRICS & GYNECOLOGY

## 2019-05-31 NOTE — PATIENT INSTRUCTIONS
If you have any questions regarding your visit, Please contact your care team.  [facility]: [phone]  [facility] CLINIC HOURS TELEPHONE NUMBER   Cephas Agbeh, M.D. Lisa -       Radha Deluna         Monday-Sam    8:00a.m-4:45 p.m    Tuesday--[place]     8:00a.m-4:45 p.m.    Thursday-Sam    8:00a.m-4:45 p.m.    Friday-Sam    8:00a.m-4:45 p.m    [facility]   [address] [place] [address]   [phone]-Ask for [facility]   Fax [phone]   Eiataqe-[phone]     [facility] Labor and Delivery   [address] [place] [address] [place] [address]   [phone]    [facility]  [address] [facility] [address]  [phone]  [id number]-[phone]   Urgent Care locations:    [facility] Monday-Friday  5 pm - 9 pm  Saturday and Sunday   9 am - 5 pm   Monday-Friday   5 pm - 9 pm  Saturday and Sunday  9 am - 5 pm    [phone]   If you need a medication refill, please contact your pharmacy. Please allow 3 business days for your refill to be completed.  As always, Thank you for trusting us with your healthcare needs!

## 2019-05-31 NOTE — PROGRESS NOTES
32w1d.  Tired.  No HA, visual changes, N/V etc. PNE classes planned/done. RTC 2 wk/prn.     MFM Impression  =========    1) Intrauterine pregnancy at 32+0 weeks gestational age.  2) Bilateral urinary tract dilation (UTD A2-3). None of the other anomalies commonly detected by ultrasound were evident in the limited fetal anatomic survey described  above.  3) Growth parameters and estimated fetal weight were consistent with an appropriate for gestation age pattern of growth.  4) The amniotic fluid volume appeared normal.    Recommendation  ==============    We discussed the findings on today's ultrasound with the patient.    A referral to pediatric urology was made given persistent urinary tract dilation.    Further ultrasound studies as clinically indicated.    Return to primary provider for continued prenatal care. Thank-you for the opportunity to participate in the care of this patient. If you have questions regarding today's  evaluation or if we can be of further service, please contact the [facility].    **Fetal anomalies may be present but not detected**.    REPORT SIGNED BY: ALKA HOLLOWAY DO    ICD-10-CM    1. Pyelectasis of fetus on prenatal ultrasound O35.8XX0    2. IUGR (intrauterine growth restriction) affecting care of mother, third trimester, fetus 1 O36.5931      CEPHAS AGBEH, MD.

## 2019-06-14 ENCOUNTER — PRENATAL OFFICE VISIT (OUTPATIENT)
Dept: OBGYN | Facility: CLINIC | Age: 29
End: 2019-06-14
Payer: COMMERCIAL

## 2019-06-14 VITALS
HEART RATE: 105 BPM | WEIGHT: 137 LBS | DIASTOLIC BLOOD PRESSURE: 70 MMHG | BODY MASS INDEX: 22.97 KG/M2 | OXYGEN SATURATION: 98 % | SYSTOLIC BLOOD PRESSURE: 117 MMHG

## 2019-06-14 DIAGNOSIS — O35.EXX0 PYELECTASIS OF FETUS ON PRENATAL ULTRASOUND: Primary | ICD-10-CM

## 2019-06-14 DIAGNOSIS — O36.5931 IUGR (INTRAUTERINE GROWTH RESTRICTION) AFFECTING CARE OF MOTHER, THIRD TRIMESTER, FETUS 1: ICD-10-CM

## 2019-06-14 PROCEDURE — 99212 OFFICE O/P EST SF 10 MIN: CPT | Performed by: OBSTETRICS & GYNECOLOGY

## 2019-06-14 NOTE — PATIENT INSTRUCTIONS
If you have any questions regarding your visit, Please contact your care team.  [facility]: [phone]  [facility] CLINIC HOURS TELEPHONE NUMBER   Cephas Agbeh, M.D. Lisa -       Radha Deluna         Monday-Sam    8:00a.m-4:45 p.m    Tuesday--[place]     8:00a.m-4:45 p.m.    Thursday-Sam    8:00a.m-4:45 p.m.    Friday-Sam    8:00a.m-4:45 p.m    [facility]   [address] [place] [address]   [phone]-Ask for [facility]   Fax [phone]   Gcyuzio-[phone]     [facility] Labor and Delivery   [address] [place] [address] [place] [address]   [phone]    [facility]  [address] [facility] [address]  [phone]  [phone]   Urgent Care locations:    [facility] Monday-Friday  5 pm - 9 pm  Saturday and Sunday   9 am - 5 pm   Monday-Friday   5 pm - 9 pm  Saturday and Sunday  9 am - 5 pm    [phone]   If you need a medication refill, please contact your pharmacy. Please allow 3 business days for your refill to be completed.  As always, Thank you for trusting us with your healthcare needs!

## 2019-06-14 NOTE — PROGRESS NOTES
34w1d  Tired.  No HA, visual changes, N/V etc. She has peds Urology appointment on 6/25/19. RTC 2 wk/prn.    ICD-10-CM    1. Pyelectasis of fetus on prenatal ultrasound O35.8XX0    2. IUGR (intrauterine growth restriction) affecting care of mother, third trimester, fetus 1 O36.5931      CEPHAS AGBEH, MD.

## 2019-06-25 ENCOUNTER — OFFICE VISIT (OUTPATIENT)
Dept: UROLOGY | Facility: CLINIC | Age: 29
End: 2019-06-25
Attending: NURSE PRACTITIONER
Payer: COMMERCIAL

## 2019-06-25 DIAGNOSIS — O35.EXX0 PYELECTASIS OF FETUS ON PRENATAL ULTRASOUND: Primary | ICD-10-CM

## 2019-06-25 PROCEDURE — G0463 HOSPITAL OUTPT CLINIC VISIT: HCPCS | Mod: ZF

## 2019-06-25 ASSESSMENT — PAIN SCALES - GENERAL: PAINLEVEL: NO PAIN (0)

## 2019-06-25 NOTE — LETTER
2019      RE: Robert Anand  [address]  [place] [address]       [facility] [place]  [address] [facility]  [place] [address]    RE:  Robert Anand  :  1990  [place] MRN:  [id number]  Date of visit:  2019        Dear Colleagues:    I had the pleasure of seeing your patient, Robert, today through the [facility] Pediatric Urology office in consultation for the question of bilateral fetal pyelectasis.  Please see below the details of this visit and my impression and plans discussed with the family.        CC:  Consult (consult for bilateral pyelectasis)       HPI:  Robert is a 29 year old woman whom I was asked to see in consultation for the above. Our visit today was assisted by a Canadian .  This is Robert's third pregnancy.  The sex of the fetus is male.  At the 22 week ultrasound it was noted that both renal pelvises were dilated with dilation of calyces (UTD A2-3).  The dilation persisted at the 32 week ultrasound and the parenchyma was noted to be of normal thickness and echogenicity.  So far the pregnancy has been complicated only by IUGR and fetal pyelectasis.  Robert is planning to deliver at [facility].  Parents are planning on having him circumcised.  There is no family history of genitourinary disorders in childhood.    PMH:  History reviewed. No pertinent past medical history.    PSH:     Past Surgical History:   Procedure Laterality Date     laproscopy      infertility       Meds, allergies, family history, social history reviewed per intake form and confirmed in our EMR.    ROS:  Negative on a 12-point scale, except for pertinent positives mentioned in the HPI.    PE:  Last menstrual period 10/18/2018, not currently breastfeeding.  There is no height or weight on file to calculate BMI.  General:  Well-appearing woman, in no apparent distress.  HEENT:  Normocephalic, normal facies  Resp:  Symmetric chest  wall movement, no audible respirations  Abd:  gravid  Skin:  Warm, well-perfused      Impression:  Fetus with bilateral moderate pyelectasis with dilation of calyces and normal parenchymal echogenicity and thickness (UTD A2-3).        Diagnoses       Codes Comments    Pyelectasis of fetus on prenatal ultrasound    -  Primary O35.8XX0            Plan:    We discussed the likely prenatal causes for this, including prenatal obstructive issues that have already resolved versus those that may need surgical help with resolution in childhood, as well as the possibility of vesicoureteral reflux.  We discussed the risks for urinary tract infection, and the pros and cons of starting the baby on daily, low-dose Amoxicillin, dosed at 10 mg/kg/d, which in this case we will likely not do. We also made our plans for follow-up with myself after the baby is born with renal/bladder ultrasound sometime between 2-8 weeks of age.  Parents have discussed that they would likely want to proceed with a VCUG at some point if the  ultrasound demonstrates persistence of renal dilation.  I addressed all questions and encouraged a phone call from their MFM if there are any future concerns during the pregnancy.      Thank you very much for allowing me the opportunity to participate in this nice family's care with you.    Sincerely,    MICHELL Prieto, CPNP  Pediatric Urology, [facility]    MICHELL Yeager CNP

## 2019-06-25 NOTE — PROGRESS NOTES
Clinic, [place]  [address] [address]  [place] [address]    RE:  Robert Anand  :  1990  [place] MRN:  [id number]  Date of visit:  2019        Dear Colleagues:    I had the pleasure of seeing your patient, Robert, today through the [facility] Pediatric Urology office in consultation for the question of bilateral fetal pyelectasis.  Please see below the details of this visit and my impression and plans discussed with the family.        CC:  Consult (consult for bilateral pyelectasis)       HPI:  Robert is a 29 year old woman whom I was asked to see in consultation for the above. Our visit today was assisted by a [place]uran .  This is Robert's third pregnancy.  The sex of the fetus is male.  At the 22 week ultrasound it was noted that both renal pelvises were dilated with dilation of calyces (UTD A2-3).  The dilation persisted at the 32 week ultrasound and the parenchyma was noted to be of normal thickness and echogenicity.  So far the pregnancy has been complicated only by IUGR and fetal pyelectasis.  Robert is planning to deliver at [facility].  Parents are planning on having him circumcised.  There is no family history of genitourinary disorders in childhood.    PMH:  History reviewed. No pertinent past medical history.    PSH:     Past Surgical History:   Procedure Laterality Date     laproscopy      infertility       Meds, allergies, family history, social history reviewed per intake form and confirmed in our EMR.    ROS:  Negative on a 12-point scale, except for pertinent positives mentioned in the HPI.    PE:  Last menstrual period 10/18/2018, not currently breastfeeding.  There is no height or weight on file to calculate BMI.  General:  Well-appearing woman, in no apparent distress.  HEENT:  Normocephalic, normal facies  Resp:  Symmetric chest wall movement, no audible respirations  Abd:  gravid  Skin:  Warm,  well-perfused      Impression:  Fetus with bilateral moderate pyelectasis with dilation of calyces and normal parenchymal echogenicity and thickness (UTD A2-3).        Diagnoses       Codes Comments    Pyelectasis of fetus on prenatal ultrasound    -  Primary O35.8XX0            Plan:    We discussed the likely prenatal causes for this, including prenatal obstructive issues that have already resolved versus those that may need surgical help with resolution in childhood, as well as the possibility of vesicoureteral reflux.  We discussed the risks for urinary tract infection, and the pros and cons of starting the baby on daily, low-dose Amoxicillin, dosed at 10 mg/kg/d, which in this case we will likely not do. We also made our plans for follow-up with myself after the baby is born with renal/bladder ultrasound sometime between 2-8 weeks of age.  Parents have discussed that they would likely want to proceed with a VCUG at some point if the  ultrasound demonstrates persistence of renal dilation.  I addressed all questions and encouraged a phone call from their MFM if there are any future concerns during the pregnancy.      Thank you very much for allowing me the opportunity to participate in this nice family's care with you.    Sincerely,    MICHELL Prieto, CPNP  Pediatric Urology, [facility]

## 2019-06-25 NOTE — PATIENT INSTRUCTIONS
[facility]   Department of Pediatric Urology  MD Kyle Loredo, MONICA Amezcua NP    [facility] [phone] - Nurse Practitioner appointments   [phone] - Dr. Moses appointments     Urology Office:    Mell Collier RN Care Coordinator    [phone] - fax     [place] [phone]    [place] [phone]    [place] scheduling    [phone]     Surgery [phone]

## 2019-07-05 ENCOUNTER — PRENATAL OFFICE VISIT (OUTPATIENT)
Dept: OBGYN | Facility: CLINIC | Age: 29
End: 2019-07-05
Payer: COMMERCIAL

## 2019-07-05 VITALS
OXYGEN SATURATION: 98 % | HEART RATE: 100 BPM | BODY MASS INDEX: 23.58 KG/M2 | WEIGHT: 140.6 LBS | DIASTOLIC BLOOD PRESSURE: 74 MMHG | SYSTOLIC BLOOD PRESSURE: 116 MMHG

## 2019-07-05 DIAGNOSIS — O35.EXX0 PYELECTASIS OF FETUS ON PRENATAL ULTRASOUND: Primary | ICD-10-CM

## 2019-07-05 DIAGNOSIS — O36.5931 IUGR (INTRAUTERINE GROWTH RESTRICTION) AFFECTING CARE OF MOTHER, THIRD TRIMESTER, FETUS 1: ICD-10-CM

## 2019-07-05 PROCEDURE — 87653 STREP B DNA AMP PROBE: CPT | Performed by: OBSTETRICS & GYNECOLOGY

## 2019-07-05 PROCEDURE — 99212 OFFICE O/P EST SF 10 MIN: CPT | Performed by: OBSTETRICS & GYNECOLOGY

## 2019-07-05 NOTE — PATIENT INSTRUCTIONS
If you have any questions regarding your visit, Please contact your care team.  [place] [facility]: [phone]  [facility] CLINIC HOURS TELEPHONE NUMBER   Cephas Agbeh, M.D. Lisa -       Radha Deluna         Monday-[place]    8:00a.m-4:45 p.m    Tuesday--[place]     8:00a.m-4:45 p.m.    Thursday-[place]    8:00a.m-4:45 p.m.    Friday-[place]    8:00a.m-4:45 p.m    [facility]   [address] [place] [address]   [phone]-Ask for [facility]   Fax [phone]   Oryrhcq-[phone]     [facility] Delivery   [address] [place] [address] [place] [address]   [phone]    [facility]  [address] [facility] [address]  [phone]  Bhlhadg-[phone]   Urgent Care locations:    [facility] Monday-Friday  5 pm - 9 pm  Saturday and Sunday   9 am - 5 pm   Monday-Friday   5 pm - 9 pm  Saturday and Sunday  9 am - 5 pm    [phone]   If you need a medication refill, please contact your pharmacy. Please allow 3 business days for your refill to be completed.  As always, Thank you for trusting us with your healthcare needs!

## 2019-07-05 NOTE — PROGRESS NOTES
37w1d  Tired.  No HA, visual changes, N/V etc.   renal ultrasound indicated by peds urology.  Impression:  Fetus with bilateral moderate pyelectasis with dilation of calyces and normal parenchymal echogenicity and thickness (UTD A2-3).                 Diagnoses        Codes Comments     Pyelectasis of fetus on prenatal ultrasound    -  Primary O35.8XX0               Plan:    We discussed the likely prenatal causes for this, including prenatal obstructive issues that have already resolved versus those that may need surgical help with resolution in childhood, as well as the possibility of vesicoureteral reflux.  We discussed the risks for urinary tract infection, and the pros and cons of starting the baby on daily, low-dose Amoxicillin, dosed at 10 mg/kg/d, which in this case we will likely not do. We also made our plans for follow-up with myself after the baby is born with renal/bladder ultrasound sometime between 2-8 weeks of age.  Parents have discussed that they would likely want to proceed with a VCUG at some point if the  ultrasound demonstrates persistence of renal dilation.  I addressed all questions and encouraged a phone call from their MFM if there are any future concerns during the pregnancy.        Thank you very much for allowing me the opportunity to participate in this nice family's care with you.     Sincerely,     MICHELL Prieto, CPNP  Pediatric Urology, [facility]     Labor plan and warning s/s discussed. RTC 1 wk/prn.  GBS done.    ICD-10-CM    1. Pyelectasis of fetus on prenatal ultrasound O35.8XX0 Strep, Group B by PCR   2. IUGR (intrauterine growth restriction) affecting care of mother, third trimester, fetus 1 O36.5931 Strep, Group B by PCR     CEPHAS AGBEH, MD.

## 2019-07-06 LAB
GP B STREP DNA SPEC QL NAA+PROBE: NEGATIVE
SPECIMEN SOURCE: NORMAL

## 2019-09-06 ENCOUNTER — PRENATAL OFFICE VISIT (OUTPATIENT)
Dept: OBGYN | Facility: CLINIC | Age: 29
End: 2019-09-06
Payer: COMMERCIAL

## 2019-09-06 VITALS — WEIGHT: 133.2 LBS | BODY MASS INDEX: 22.34 KG/M2 | SYSTOLIC BLOOD PRESSURE: 136 MMHG | DIASTOLIC BLOOD PRESSURE: 87 MMHG

## 2019-09-06 PROCEDURE — 96372 THER/PROPH/DIAG INJ SC/IM: CPT | Performed by: OBSTETRICS & GYNECOLOGY

## 2019-09-06 RX ORDER — MEDROXYPROGESTERONE ACETATE 150 MG/ML
150 INJECTION, SUSPENSION INTRAMUSCULAR
Status: DISCONTINUED | OUTPATIENT
Start: 2019-09-06 | End: 2022-02-08

## 2019-09-06 RX ADMIN — MEDROXYPROGESTERONE ACETATE 150 MG: 150 INJECTION, SUSPENSION INTRAMUSCULAR at 16:58

## 2019-09-06 ASSESSMENT — ANXIETY QUESTIONNAIRES
2. NOT BEING ABLE TO STOP OR CONTROL WORRYING: NOT AT ALL
5. BEING SO RESTLESS THAT IT IS HARD TO SIT STILL: NOT AT ALL
3. WORRYING TOO MUCH ABOUT DIFFERENT THINGS: NOT AT ALL
GAD7 TOTAL SCORE: 0
7. FEELING AFRAID AS IF SOMETHING AWFUL MIGHT HAPPEN: NOT AT ALL
1. FEELING NERVOUS, ANXIOUS, OR ON EDGE: NOT AT ALL
6. BECOMING EASILY ANNOYED OR IRRITABLE: NOT AT ALL

## 2019-09-06 ASSESSMENT — PATIENT HEALTH QUESTIONNAIRE - PHQ9
5. POOR APPETITE OR OVEREATING: NOT AT ALL
SUM OF ALL RESPONSES TO PHQ QUESTIONS 1-9: 0

## 2019-09-06 NOTE — PATIENT INSTRUCTIONS
If you have any questions regarding your visit, Please contact your care team.  [facility] Access [facility]: [phone]  [facility] CLINIC HOURS TELEPHONE NUMBER   Cephas Agbeh, M.D. Lisa -       Radha Deluna         Monday-[place]    8:00a.m-4:45 p.m    Tuesday--[place]     8:00a.m-4:45 p.m.    Thursday-[place]    8:00a.m-4:45 p.m.    Friday-[place]    8:00a.m-4:45 p.m    [facility]   [address] [place] [address]   [phone]-Ask for [facility]   Fax [phone]   Xgyiuty-[phone]     [facility] Labor and Delivery   [address] [place] [address] [place] [address]   [phone]    [facility]  [address] [facility] [address]  [phone]  Ykyxzbm-[phone]   Urgent Care locations:    [facility] Monday-Friday  5 pm - 9 pm  Saturday and Sunday   9 am - 5 pm   Monday-Friday   5 pm - 9 pm  Saturday and Sunday  9 am - 5 pm    [phone]   If you need a medication refill, please contact your pharmacy. Please allow 3 business days for your refill to be completed.  As always, Thank you for trusting us with your healthcare needs!

## 2019-09-06 NOTE — PROGRESS NOTES
Robert is here for a 6-week postpartum checkup.    She had a  at home of a liveborn baby boy, weight 3 kg.  The delivery was uncomplicated.  Since delivery, she has been breast feeding.  She has not had a normal menses.  She has not had intercourse.  Patient screened for postpartum depression and complaints are NEGATIVE. Screening has also been completed for intimate partner violence.    Her last pap was  and was Normal    EXAM: /87   Wt 60.4 kg (133 lb 3.2 oz)   LMP 10/18/2018   Breastfeeding? Yes   BMI 22.34 kg/m      HEENT: grossly normal.  NECK: no lymphadenopathy or thyromegaly.  ABDOMEN: soft, non tender, good bowel sounds, without masses, rebound, guarding or tenderness.  INC: well healed   EXTREMITIES: Warm to touch, no ankle edema or calf tenderness.    PELVIC:    External genitalia: normal without lesion,  perineum well healed   Vagina: normal mucosa and rugae, normal discharge.  Cervix: normal without lesion.  Uterus: small, mobile, nontender.  Adnexa: No masses, No tenderness  Rectal: deferred, external hemorrhoids absent    A/P  Routine Postpartum    ICD-10-CM    1. Contraception Z78.9 medroxyPROGESTERone (DEPO-PROVERA) injection 150 mg   2. Routine postpartum follow-up Z39.2        1. Contraception: depoprovera  2. Annual due in  every 12 months    CEPHAS AGBEH, MD.

## 2019-09-07 ASSESSMENT — ANXIETY QUESTIONNAIRES: GAD7 TOTAL SCORE: 0

## 2019-12-06 ENCOUNTER — ALLIED HEALTH/NURSE VISIT (OUTPATIENT)
Dept: NURSING | Facility: CLINIC | Age: 29
End: 2019-12-06
Payer: COMMERCIAL

## 2019-12-06 VITALS — HEART RATE: 82 BPM | TEMPERATURE: 98 F | DIASTOLIC BLOOD PRESSURE: 78 MMHG | SYSTOLIC BLOOD PRESSURE: 124 MMHG

## 2019-12-06 DIAGNOSIS — Z30.9 CONTRACEPTIVE MANAGEMENT: Primary | ICD-10-CM

## 2019-12-06 PROCEDURE — 96372 THER/PROPH/DIAG INJ SC/IM: CPT

## 2019-12-06 RX ADMIN — MEDROXYPROGESTERONE ACETATE 150 MG: 150 INJECTION, SUSPENSION INTRAMUSCULAR at 08:51

## 2019-12-06 NOTE — PROGRESS NOTES
BP: 124/78    LAST PAP/EXAM:   Lab Results   Component Value Date    PAP NIL 01/26/2017     URINE HCG:not indicated    The following medication was given:     MEDICATION: Depo Provera 150mg  ROUTE: IM  SITE: [facility]  : [employer]  LOT #: [id number]  EXP:03/2021  NEXT INJECTION DUE: 2/21/20 - 3/6/20   Provider: Agbeh, C Angela Paskar, CMA ([facility])

## 2020-01-17 ENCOUNTER — OFFICE VISIT (OUTPATIENT)
Dept: OBGYN | Facility: CLINIC | Age: 30
End: 2020-01-17
Payer: COMMERCIAL

## 2020-01-17 VITALS
WEIGHT: 134.2 LBS | HEART RATE: 65 BPM | SYSTOLIC BLOOD PRESSURE: 130 MMHG | BODY MASS INDEX: 22.5 KG/M2 | DIASTOLIC BLOOD PRESSURE: 89 MMHG

## 2020-01-17 DIAGNOSIS — R30.0 DYSURIA: Primary | ICD-10-CM

## 2020-01-17 LAB
ALBUMIN UR-MCNC: 100 MG/DL
APPEARANCE UR: ABNORMAL
BACTERIA #/AREA URNS HPF: ABNORMAL /HPF
BILIRUB UR QL STRIP: NEGATIVE
COLOR UR AUTO: YELLOW
GLUCOSE UR STRIP-MCNC: NEGATIVE MG/DL
HGB UR QL STRIP: ABNORMAL
KETONES UR STRIP-MCNC: NEGATIVE MG/DL
LEUKOCYTE ESTERASE UR QL STRIP: NEGATIVE
NITRATE UR QL: NEGATIVE
NON-SQ EPI CELLS #/AREA URNS LPF: ABNORMAL /LPF
PH UR STRIP: 5 PH (ref 5–7)
RBC #/AREA URNS AUTO: ABNORMAL /HPF
SOURCE: ABNORMAL
SP GR UR STRIP: 1.02 (ref 1–1.03)
UROBILINOGEN UR STRIP-ACNC: 0.2 EU/DL (ref 0.2–1)
WBC #/AREA URNS AUTO: ABNORMAL /HPF

## 2020-01-17 PROCEDURE — 99214 OFFICE O/P EST MOD 30 MIN: CPT | Performed by: OBSTETRICS & GYNECOLOGY

## 2020-01-17 PROCEDURE — 81001 URINALYSIS AUTO W/SCOPE: CPT | Performed by: OBSTETRICS & GYNECOLOGY

## 2020-01-17 RX ORDER — NITROFURANTOIN 25; 75 MG/1; MG/1
100 CAPSULE ORAL 2 TIMES DAILY
Qty: 14 CAPSULE | Refills: 0 | Status: SHIPPED | OUTPATIENT
Start: 2020-01-17 | End: 2020-03-09

## 2020-01-17 NOTE — PATIENT INSTRUCTIONS
If you have any questions regarding your visit, Please contact your care team.  [facility]: [phone]  [facility] CLINIC HOURS TELEPHONE NUMBER   Cephas Agbeh, M.D.      Radha Ross-  Charity-         Monday-[place]    8:00a.m-4:45 p.m    Tuesday--[place]     8:00a.m-4:45 p.m.    Thursday-[place]    8:00a.m-4:45 p.m.    Friday-[place]    8:00a.m-4:45 p.m    [facility]   [address] [place] [address]   [phone]-Ask for [facility]   Fax [phone]   Tlijoaw-[phone]     [facility] Labor and Delivery   [address] [place] [address] [place] [address]   [phone]    [facility]  [address] [facility] [address]  [phone]  Wpdjfra-[phone]   Urgent Care locations:    [facility] Monday-Friday  5 pm - 9 pm  Saturday and Sunday   9 am - 5 pm   Monday-Friday   5 pm - 9 pm  Saturday and Sunday  9 am - 5 pm    [phone]   If you need a medication refill, please contact your pharmacy. Please allow 3 business days for your refill to be completed.  As always, Thank you for trusting us with your healthcare needs!

## 2020-01-17 NOTE — PROGRESS NOTES
Robert is a 30 year old  referred here by self for consultation regarding painful urination with hematuria x 4 days. Denies fever or chills or back pain.  Here with Romansh  Andrew.    ROS: Ten point review of systems was reviewed and negative except the above.    Gyne: - abn pap (last pap ), - STD's    History reviewed. No pertinent past medical history.  Past Surgical History:   Procedure Laterality Date     laproscopy  2013    infertility     Patient Active Problem List   Diagnosis     Female infertility     Male infertility     Irregular menses     Supervision of other normal pregnancy, antepartum     IUGR (intrauterine growth restriction) affecting care of mother, third trimester, fetus 1     Pyelectasis of fetus on prenatal ultrasound       ALL/Meds: Her medication and allergy histories were reviewed and are documented in their appropriate chart areas.    SH: - tob, - EtOH,     FH: Her family history was reviewed and documented in its appropriate chart area.    PE: /89   Pulse 65   Wt 60.9 kg (134 lb 3.2 oz)   Breastfeeding No   BMI 22.50 kg/m    Body mass index is 22.5 kg/m .    General Appearance:  healthy, alert, active, no distress  HEENT: NCAT  Abdomen: Soft, nontender.  Normal bowel sounds.  No masses  Pelvic:      Deferred.  Results for orders placed or performed in visit on 20   *UA reflex to Microscopic     Status: Abnormal   Result Value Ref Range    Color Urine Yellow     Appearance Urine Slightly Cloudy     Glucose Urine Negative NEG^Negative mg/dL    Bilirubin Urine Negative NEG^Negative    Ketones Urine Negative NEG^Negative mg/dL    Specific Gravity Urine 1.020 1.003 - 1.035    Blood Urine Moderate (A) NEG^Negative    pH Urine 5.0 5.0 - 7.0 pH    Protein Albumin Urine 100 (A) NEG^Negative mg/dL    Urobilinogen Urine 0.2 0.2 - 1.0 EU/dL    Nitrite Urine Negative NEG^Negative    Leukocyte Esterase Urine Negative NEG^Negative    Source Midstream Urine    Urine  Microscopic     Status: Abnormal   Result Value Ref Range    WBC Urine 0 - 5 OTO5^0 - 5 /HPF    RBC Urine 25-50 (A) OTO2^O - 2 /HPF    Squamous Epithelial /LPF Urine Few FEW^Few /LPF    Bacteria Urine Few (A) NEG^Negative /HPF       A/P    ICD-10-CM    1. Dysuria R30.0 *UA reflex to Microscopic     Urine Microscopic     nitroFURantoin macrocrystal-monohydrate (MACROBID) 100 MG capsule     Treat with abx.    25 minutes was spent face to face with the patient today discussing her history, diagnosis, and follow-up plan as noted above.  Over 50% of the visit was spent in counseling and coordination of care.    Total Visit Time: 30 minutes.        CEPHAS AGBEH, MD.

## 2020-03-06 ENCOUNTER — ALLIED HEALTH/NURSE VISIT (OUTPATIENT)
Dept: NURSING | Facility: CLINIC | Age: 30
End: 2020-03-06
Payer: COMMERCIAL

## 2020-03-06 VITALS — BODY MASS INDEX: 22.14 KG/M2 | SYSTOLIC BLOOD PRESSURE: 122 MMHG | DIASTOLIC BLOOD PRESSURE: 70 MMHG | WEIGHT: 132 LBS

## 2020-03-06 DIAGNOSIS — Z30.42 ENCOUNTER FOR MANAGEMENT AND INJECTION OF DEPO-PROVERA: Primary | ICD-10-CM

## 2020-03-06 PROCEDURE — 99207 ZZC NO CHARGE NURSE ONLY: CPT

## 2020-03-06 PROCEDURE — 96372 THER/PROPH/DIAG INJ SC/IM: CPT

## 2020-03-06 RX ADMIN — MEDROXYPROGESTERONE ACETATE 150 MG: 150 INJECTION, SUSPENSION INTRAMUSCULAR at 15:56

## 2020-03-06 NOTE — PROGRESS NOTES
Clinic Administered Medication Documentation      Depo Provera Documentation    URINE HCG: not indicated    Depo-Provera Standing Order inclusion/exclusion criteria reviewed.   Patient meets: inclusion criteria     BP: 122/70  LAST PAP/EXAM:   Lab Results   Component Value Date    PAP NIL 01/26/2017       Prior to injection, verified patient identity using patient's name and date of birth. Medication was administered. Please see MAR and medication order for additional information.     Was entire vial of medication used? Yes  Vial/Syringe: Single dose vial  Expiration Date:  Sept 2020    Patient instructed to remain in clinic for 15 minutes and report any adverse reaction to staff immediately .  NEXT INJECTION DUE: 5/22/20 - 6/5/20    The following medication was given:     MEDICATION: Depo Provera 150mg  ROUTE: IM  SITE: LUQ - Gluteus  : [employer]  LOT #: [id number]  EXP:Sep 2020  NEXT INJECTION DUE: 5/22/20 - 6/5/20   Provider: Dr. Agbeh Malia Vang, MA

## 2020-03-09 ENCOUNTER — OFFICE VISIT (OUTPATIENT)
Dept: FAMILY MEDICINE | Facility: CLINIC | Age: 30
End: 2020-03-09
Payer: COMMERCIAL

## 2020-03-09 VITALS
SYSTOLIC BLOOD PRESSURE: 124 MMHG | HEART RATE: 76 BPM | OXYGEN SATURATION: 99 % | WEIGHT: 132 LBS | TEMPERATURE: 99 F | DIASTOLIC BLOOD PRESSURE: 82 MMHG | BODY MASS INDEX: 22.14 KG/M2

## 2020-03-09 DIAGNOSIS — R10.2 PELVIC PAIN IN FEMALE: ICD-10-CM

## 2020-03-09 DIAGNOSIS — R30.0 DYSURIA: Primary | ICD-10-CM

## 2020-03-09 DIAGNOSIS — Z12.4 SCREENING FOR MALIGNANT NEOPLASM OF CERVIX: ICD-10-CM

## 2020-03-09 LAB
ALBUMIN UR-MCNC: NEGATIVE MG/DL
APPEARANCE UR: CLEAR
BILIRUB UR QL STRIP: NEGATIVE
COLOR UR AUTO: YELLOW
GLUCOSE UR STRIP-MCNC: NEGATIVE MG/DL
HGB UR QL STRIP: NEGATIVE
KETONES UR STRIP-MCNC: NEGATIVE MG/DL
LEUKOCYTE ESTERASE UR QL STRIP: NEGATIVE
NITRATE UR QL: NEGATIVE
PH UR STRIP: 5.5 PH (ref 5–7)
SOURCE: NORMAL
SP GR UR STRIP: 1.03 (ref 1–1.03)
SPECIMEN SOURCE: NORMAL
UROBILINOGEN UR STRIP-ACNC: 0.2 EU/DL (ref 0.2–1)
WET PREP SPEC: NORMAL

## 2020-03-09 PROCEDURE — 87624 HPV HI-RISK TYP POOLED RSLT: CPT | Performed by: NURSE PRACTITIONER

## 2020-03-09 PROCEDURE — G0145 SCR C/V CYTO,THINLAYER,RESCR: HCPCS | Performed by: NURSE PRACTITIONER

## 2020-03-09 PROCEDURE — 99213 OFFICE O/P EST LOW 20 MIN: CPT | Performed by: NURSE PRACTITIONER

## 2020-03-09 PROCEDURE — 81003 URINALYSIS AUTO W/O SCOPE: CPT | Performed by: NURSE PRACTITIONER

## 2020-03-09 PROCEDURE — 87210 SMEAR WET MOUNT SALINE/INK: CPT | Performed by: NURSE PRACTITIONER

## 2020-03-09 PROCEDURE — 87491 CHLMYD TRACH DNA AMP PROBE: CPT | Performed by: NURSE PRACTITIONER

## 2020-03-09 PROCEDURE — G0476 HPV COMBO ASSAY CA SCREEN: HCPCS | Performed by: NURSE PRACTITIONER

## 2020-03-09 PROCEDURE — 87591 N.GONORRHOEAE DNA AMP PROB: CPT | Performed by: NURSE PRACTITIONER

## 2020-03-09 NOTE — LETTER
March 16, 2020    Robert Anand  [address]   [facility] [address]    Dear ,  This letter is regarding your recent Pap smear (cervical cancer screening) and Human Papillomavirus (HPV) test.  We are happy to inform you that your Pap smear result is normal. Cervical cancer is closely linked with certain types of HPV. Your results showed no evidence of high-risk HPV.  We recommend you have your next PAP smear and HPV test in 5 years.  You will still need to return to the clinic every year for an annual exam and other preventive tests.  If you have additional questions regarding this result, please call our registered nurse, Josefina at [phone].  Sincerely,    Brandy Irene NP/sachin

## 2020-03-09 NOTE — PROGRESS NOTES
Subjective     Robert Anand is a 30 year old female who presents to clinic today for the following health issues:    HPI   URINARY TRACT SYMPTOMS  Onset: 6 days ago    Description:   Painful urination (Dysuria): YES           Frequency: YES  Blood in urine (Hematuria): YES  Delay in urine (Hesitency): YES    Intensity: 9-10/10    Progression of Symptoms:  worsening    Accompanying Signs & Symptoms:  Fever/chills: no   Flank pain YES  Nausea and vomiting: no   Any vaginal symptoms: none  Abdominal/Pelvic Pain: no     History:   History of frequent UTI's: YES, this past January patient had UTI   History of kidney stones: no   Sexually Active: YES  Possibility of pregnancy: No    Precipitating factors:     Patient last depo provera was 3 days ago.      Therapies Tried and outcome: Patient taken antibiotics in the past and it helped.     She denies history of kidney stones. She reports dysuria. She denies back injuries recently. She is not currently taking  Medications for pain. She denies concerns for STDs. She reports diarrhea the past 2 days but none today. She states that she does not get a period while on the depo provera. She is  with children. She denies concerns for being pregnant.     No current outpatient medications on file.     No Known Allergies    Reviewed and updated as needed this visit by Provider         Review of Systems   ROS COMP: CONSTITUTIONAL: NEGATIVE for fever, chills, change in weight  ENT/MOUTH: NEGATIVE for ear, mouth and throat problems  RESP: NEGATIVE for significant cough or SOB  CV: NEGATIVE for chest pain, palpitations or peripheral edema  GI: abdominal pain  : dysuria      Objective    /82 (BP Location: Right arm, Patient Position: Chair, Cuff Size: Adult Regular)   Pulse 76   Temp 99  F (37.2  C) (Oral)   Wt 59.9 kg (132 lb)   SpO2 99%   BMI 22.14 kg/m    Body mass index is 22.14 kg/m .  Physical Exam   GENERAL: healthy, alert and no distress  RESP: lungs  clear to auscultation - no rales, rhonchi or wheezes  CV: regular rate and rhythm, normal S1 S2, no S3 or S4, no murmur, click or rub, no peripheral edema and peripheral pulses strong  ABDOMEN: soft, nontender, no hepatosplenomegaly, no masses and bowel sounds normal, no CVA tenderness   (female): small amount of yellow vaginal discharge noted, pt reports tenderness with pelvic exam, no significant erythema noted to vaginal wall/cervix   MS: no gross musculoskeletal defects noted, no edema  SKIN: no suspicious lesions or rashes  NEURO: speech normal  PSYCH: mentation appears normal, affect normal/bright    Assessment & Plan     1. Dysuria  Normal UA, reviewed results with pt   - UA reflex to Microscopic and Culture    2. Pelvic pain in female  Will check wetprep and STD screening. If all results are normal, we discussed pelvic ultrasound   - Wet prep  - Chlamydia trachomatis PCR  - Neisseria gonorrhoeae PCR    3. Screening for malignant neoplasm of cervix  - Pap imaged thin layer screen with HPV - recommended age 30 - 65 years (select HPV order below)  - HPV High Risk Types DNA Cervical       Return in about 1 week (around 3/16/2020) for If symptoms worsen or fail to improve.    Brandy Irene NP  [facility]

## 2020-03-09 NOTE — PATIENT INSTRUCTIONS
We will inform you of the test results when they are available. If results are normal, an ultrasound will be ordered. Urine was normal today.

## 2020-03-11 LAB
COPATH REPORT: NORMAL
PAP: NORMAL

## 2020-03-12 ENCOUNTER — TELEPHONE (OUTPATIENT)
Dept: FAMILY MEDICINE | Facility: CLINIC | Age: 30
End: 2020-03-12

## 2020-03-12 DIAGNOSIS — R10.2 PELVIC PAIN IN FEMALE: Primary | ICD-10-CM

## 2020-03-12 NOTE — TELEPHONE ENCOUNTER
Please contact pt to let her know that her UA, wet prep and STD screening all came back normal. At this time i'm recommending a pelvic ultrasound to further assess her symptoms if still present. Order was placed.

## 2020-03-13 LAB
FINAL DIAGNOSIS: NORMAL
HPV HR 12 DNA CVX QL NAA+PROBE: NEGATIVE
HPV16 DNA SPEC QL NAA+PROBE: NEGATIVE
HPV18 DNA SPEC QL NAA+PROBE: NEGATIVE
SPECIMEN DESCRIPTION: NORMAL
SPECIMEN SOURCE CVX/VAG CYTO: NORMAL

## 2020-03-17 NOTE — TELEPHONE ENCOUNTER
Reached out to patient with [place] .  Patient/family was instructed to return call to [facility], directly on the RN Call back line at [phone].    Lynn [place] RN

## 2020-05-26 ENCOUNTER — OFFICE VISIT (OUTPATIENT)
Dept: OBGYN | Facility: CLINIC | Age: 30
End: 2020-05-26
Payer: COMMERCIAL

## 2020-05-26 VITALS — DIASTOLIC BLOOD PRESSURE: 90 MMHG | SYSTOLIC BLOOD PRESSURE: 136 MMHG | HEART RATE: 87 BPM

## 2020-05-26 DIAGNOSIS — R30.0 DYSURIA: Primary | ICD-10-CM

## 2020-05-26 DIAGNOSIS — R10.2 PELVIC PAIN IN FEMALE: ICD-10-CM

## 2020-05-26 LAB
ALBUMIN UR-MCNC: 10 MG/DL
APPEARANCE UR: CLEAR
BILIRUB UR QL STRIP: NEGATIVE
COLOR UR AUTO: YELLOW
GLUCOSE UR STRIP-MCNC: NEGATIVE MG/DL
HGB UR QL STRIP: NEGATIVE
KETONES UR STRIP-MCNC: NEGATIVE MG/DL
LEUKOCYTE ESTERASE UR QL STRIP: NEGATIVE
MUCOUS THREADS #/AREA URNS LPF: PRESENT /LPF
NITRATE UR QL: NEGATIVE
NON-SQ EPI CELLS #/AREA URNS LPF: ABNORMAL /LPF
PH UR STRIP: 7 PH (ref 5–7)
RBC #/AREA URNS AUTO: ABNORMAL /HPF
SOURCE: ABNORMAL
SP GR UR STRIP: 1.03 (ref 1–1.03)
UROBILINOGEN UR STRIP-MCNC: NORMAL MG/DL (ref 0–2)
WBC #/AREA URNS AUTO: ABNORMAL /HPF

## 2020-05-26 PROCEDURE — 96372 THER/PROPH/DIAG INJ SC/IM: CPT | Performed by: OBSTETRICS & GYNECOLOGY

## 2020-05-26 PROCEDURE — 81001 URINALYSIS AUTO W/SCOPE: CPT | Performed by: OBSTETRICS & GYNECOLOGY

## 2020-05-26 PROCEDURE — 99214 OFFICE O/P EST MOD 30 MIN: CPT | Mod: 25 | Performed by: OBSTETRICS & GYNECOLOGY

## 2020-05-26 RX ORDER — CEPHALEXIN 500 MG/1
500 CAPSULE ORAL 4 TIMES DAILY
Qty: 40 CAPSULE | Refills: 1 | Status: SHIPPED | OUTPATIENT
Start: 2020-05-26 | End: 2020-11-19

## 2020-05-26 RX ADMIN — MEDROXYPROGESTERONE ACETATE 150 MG: 150 INJECTION, SUSPENSION INTRAMUSCULAR at 15:10

## 2020-05-26 NOTE — PROGRESS NOTES
Robert is a 30 year old  referred here by self for consultation regarding painful and frequent urination 6x daily..She says this has been going on for two years. She had a normal vaginal delivery a year ago    Review of records show 3-4 UTI treated in the last couple of years.  On depo provera for contraception. Injection today.  Sinhala interprater on the call Nurse also present for history and exam/    ROS: Ten point review of systems was reviewed and negative except the above.    Gyne: - abn pap (last pap ), - STD's    History reviewed. No pertinent past medical history.  Past Surgical History:   Procedure Laterality Date     laproscopy  2013    infertility     Patient Active Problem List   Diagnosis     Female infertility     Male infertility     Irregular menses     Supervision of other normal pregnancy, antepartum     IUGR (intrauterine growth restriction) affecting care of mother, third trimester, fetus 1     Pyelectasis of fetus on prenatal ultrasound       ALL/Meds: Her medication and allergy histories were reviewed and are documented in their appropriate chart areas.    SH: - tob, - EtOH,     FH: Her family history was reviewed and documented in its appropriate chart area.    PE: BP (!) 136/90   Pulse 87   Breastfeeding No   There is no height or weight on file to calculate BMI.    General Appearance:  healthy, alert, active, no distress  HEENT: NCAT  Abdomen: Soft, nontender.  Normal bowel sounds.  No masses  Pelvic:       - Ext: NEFG,        - Urethra: no lesions, no masses, no hypermobility       - Urethral Meatus: normal appearance,        - Bladder: min tenderness, no masses       - Vagina: pink, moist, normal rugae, no lesions, no discharge       - Cervix: no lesions, parous       - Uterus: normal sized but tender, Midplane, mobile, nor contour       - Adnexa: no masses, no tenderness       - Rectal: deferred,        - Pelvic support: no cystocele, no rectocele, no uterine  prolapse    A/P    ICD-10-CM    1. Dysuria  R30.0 US OB < 14 Weeks Single     cephALEXin (KEFLEX) 500 MG capsule     UA with Microscopic reflex to Culture     CANCELED: UA with Microscopic reflex to Culture   2. Pelvic pain in female  R10.2 US OB < 14 Weeks Single     cephALEXin (KEFLEX) 500 MG capsule     CANCELED: UA with Microscopic reflex to Culture         Repeat UA and abx..  Pelvic ultrasound.    If above results are normal, will send to UROLOGY. For recurrent UTI.    25 minutes was spent face to face with the patient today discussing her history, diagnosis, and follow-up plan as noted above.  Over 50% of the visit was spent in counseling and coordination of care.    Total Visit Time: 30 minutes.    CEPHAS AGBEH, MD.   Depo-provera injection administered.  Anna Marie Lund MA

## 2020-05-26 NOTE — PATIENT INSTRUCTIONS
If you have any questions regarding your visit, Please contact your care team.  [facility]: [phone]  [facility] CL[facility] HOURS TELEPHONE NUMBER   Cephas Agbeh, M.D.      Radha Ross-  Charity-         Monday-Sam    8:00a.m-4:45 p.m    Tuesday--[place]     8:00a.m-4:45 p.m.    Thursday-Sam    8:00a.m-4:45 p.m.    Friday-Sam    8:00a.m-4:45 p.m    [facility]   [address] [place] [address]   [phone]-Ask for [facility]   Fax [phone]   [phone]     [facility]   [address] [place] [address] [place] [address]   [phone]    [facility]  [address] [facility] [address]  [phone]  [phone]   Urgent Care locations:    [facility] Monday-Friday  5 pm - 9 pm  Saturday and Sunday   9 am - 5 pm   Monday-Friday   5 pm - 9 pm  Saturday and Sunday  9 am - 5 pm    [phone]   If you need a medication refill, please contact your pharmacy. Please allow 3 business days for your refill to be completed.  As always, Thank you for trusting us with your healthcare needs!

## 2020-08-17 ENCOUNTER — ALLIED HEALTH/NURSE VISIT (OUTPATIENT)
Dept: NURSING | Facility: CLINIC | Age: 30
End: 2020-08-17
Payer: COMMERCIAL

## 2020-08-17 VITALS
BODY MASS INDEX: 21.87 KG/M2 | DIASTOLIC BLOOD PRESSURE: 78 MMHG | WEIGHT: 130.4 LBS | SYSTOLIC BLOOD PRESSURE: 130 MMHG | HEART RATE: 81 BPM

## 2020-08-17 DIAGNOSIS — Z30.9 CONTRACEPTIVE MANAGEMENT: Primary | ICD-10-CM

## 2020-08-17 PROCEDURE — 96372 THER/PROPH/DIAG INJ SC/IM: CPT

## 2020-08-17 RX ADMIN — MEDROXYPROGESTERONE ACETATE 150 MG: 150 INJECTION, SUSPENSION INTRAMUSCULAR at 15:38

## 2020-08-17 NOTE — PROGRESS NOTES
Clinic Administered Medication Documentation      Depo Provera Documentation    URINE HCG: not indicated    Depo-Provera Standing Order inclusion/exclusion criteria reviewed.   Patient meets: inclusion criteria     BP: 130/78  LAST PAP/EXAM:   Lab Results   Component Value Date    PAP NIL 03/09/2020       Prior to injection, verified patient identity using patient's name and date of birth. Medication was administered. Please see MAR and medication order for additional information.     Was entire vial of medication used? Yes  Vial/Syringe: Single dose vial  Expiration Date:  10/31/2020    Patient instructed to remain in clinic for 15 minutes and report any adverse reaction to staff immediately .  NEXT INJECTION DUE: 11/2/20 - 11/16/20

## 2020-11-17 ENCOUNTER — NURSE TRIAGE (OUTPATIENT)
Dept: NURSING | Facility: CLINIC | Age: 30
End: 2020-11-17

## 2020-11-17 DIAGNOSIS — Z30.09 ENCOUNTER FOR OTHER GENERAL COUNSELING OR ADVICE ON CONTRACEPTION: Primary | ICD-10-CM

## 2020-11-17 NOTE — TELEPHONE ENCOUNTER
RN routing to provider for advisement on depo provera injection orders.    Bhavya Ballesteros RN on 11/17/2020 at 3:00 PM

## 2020-11-17 NOTE — TELEPHONE ENCOUNTER
The patient wants to get her depo shot and needs a current order placed in her chart states that someone from the [facility] called her, however there is not message in the chart.    Was advised that she probably needs an up to date physical?    Transferred to scheduling to reschedule Depo shot and to schedule a physical.    Would clinic be able to place an order for the patient depo shot?    Ifeoma Lopez RN  [facility] Nurse Advisor  2:41 PM  11/17/2020        Additional Information    Caller has NON-URGENT medication question about med that PCP prescribed and triager unable to answer question    Protocols used: MEDICATION QUESTION CALL-A-OH

## 2020-11-18 RX ORDER — MEDROXYPROGESTERONE ACETATE 150 MG/ML
150 INJECTION, SUSPENSION INTRAMUSCULAR
Status: SHIPPED | OUTPATIENT
Start: 2020-11-18 | End: 2021-11-13

## 2020-11-18 NOTE — TELEPHONE ENCOUNTER
Agbeh, Cephas Mawuena, MD  You 10 minutes ago (10:49 AM)     Depo orders placed.   CEPHAS AGBEH, MD.      Routing comment      Bhavya Ballesteros RN on 11/18/2020 at 11:02 AM

## 2020-11-19 ENCOUNTER — OFFICE VISIT (OUTPATIENT)
Dept: OBGYN | Facility: CLINIC | Age: 30
End: 2020-11-19
Payer: COMMERCIAL

## 2020-11-19 VITALS
SYSTOLIC BLOOD PRESSURE: 129 MMHG | HEART RATE: 66 BPM | WEIGHT: 131.8 LBS | HEIGHT: 65 IN | BODY MASS INDEX: 21.96 KG/M2 | DIASTOLIC BLOOD PRESSURE: 87 MMHG

## 2020-11-19 DIAGNOSIS — Z32.00 PREGNANCY EXAMINATION OR TEST, PREGNANCY UNCONFIRMED: Primary | ICD-10-CM

## 2020-11-19 DIAGNOSIS — R30.0 DYSURIA: ICD-10-CM

## 2020-11-19 LAB — HCG UR QL: NEGATIVE

## 2020-11-19 PROCEDURE — 81025 URINE PREGNANCY TEST: CPT | Performed by: OBSTETRICS & GYNECOLOGY

## 2020-11-19 PROCEDURE — 96372 THER/PROPH/DIAG INJ SC/IM: CPT | Performed by: OBSTETRICS & GYNECOLOGY

## 2020-11-19 PROCEDURE — 99214 OFFICE O/P EST MOD 30 MIN: CPT | Mod: 25 | Performed by: OBSTETRICS & GYNECOLOGY

## 2020-11-19 RX ORDER — NITROFURANTOIN 25; 75 MG/1; MG/1
100 CAPSULE ORAL 2 TIMES DAILY
Qty: 14 CAPSULE | Refills: 0 | Status: SHIPPED | OUTPATIENT
Start: 2020-11-19 | End: 2021-07-13

## 2020-11-19 RX ADMIN — MEDROXYPROGESTERONE ACETATE 150 MG: 150 INJECTION, SUSPENSION INTRAMUSCULAR at 10:30

## 2020-11-19 ASSESSMENT — MIFFLIN-ST. JEOR: SCORE: 1314.97

## 2020-11-19 NOTE — PROGRESS NOTES
"Robert is a 30 year old  referred here by self for consultation regarding painful urination since yesterday. Denies fever or chills. She is also due to renew depo provera..    ROS: Ten point review of systems was reviewed and negative except the above.    Gyne: - abn pap (last pap ), - STD's    History reviewed. No pertinent past medical history.  Past Surgical History:   Procedure Laterality Date     laproscopy  2013    infertility     Patient Active Problem List   Diagnosis     Female infertility     Male infertility     Irregular menses     Supervision of other normal pregnancy, antepartum     IUGR (intrauterine growth restriction) affecting care of mother, third trimester, fetus 1     Pyelectasis of fetus on prenatal ultrasound       ALL/Meds: Her medication and allergy histories were reviewed and are documented in their appropriate chart areas.    SH: - tob, - EtOH,     FH: Her family history was reviewed and documented in its appropriate chart area.    PE: /87   Pulse 66   Ht 1.645 m (5' 4.76\")   Wt 59.8 kg (131 lb 12.8 oz)   Breastfeeding No   BMI 22.09 kg/m    Body mass index is 22.09 kg/m .    General Appearance:  healthy, alert, active, no distress  HEENT: NCAT  Abdomen:Mild suprapubic tenderness.  Normal bowel sounds.  No masses    The following medication was given:     MEDICATION: Depo Provera 150mg  ROUTE: IM  SITE: Ventrogluteal - Right  DOSE: 150mg  LOT #: [id number]  :  AMPHASTAR  EXPIRATION DATE:  2022  NDC#: 3562-0011-74         A/P      ICD-10-CM    1. Pregnancy examination or test, pregnancy unconfirmed  Z32.00 HCG Qual, Urine ([id number])     nitroFURantoin macrocrystal-monohydrate (MACROBID) 100 MG capsule   2. Dysuria  R30.0 nitroFURantoin macrocrystal-monohydrate (MACROBID) 100 MG capsule     Urine obtained was not a clean catch. Will treat UTI presumptively.    25 minutes was spent face to face with the patient today discussing her history, diagnosis, and " follow-up plan as noted above.  Over 50% of the visit was spent in counseling and coordination of care.    Total Visit Time: 30 minutes.      CEPHAS AGBEH, MD.

## 2021-02-05 ENCOUNTER — ALLIED HEALTH/NURSE VISIT (OUTPATIENT)
Dept: FAMILY MEDICINE | Facility: OTHER | Age: 31
End: 2021-02-05
Payer: COMMERCIAL

## 2021-02-05 DIAGNOSIS — N92.6 IRREGULAR MENSES: Primary | ICD-10-CM

## 2021-02-05 PROCEDURE — 96372 THER/PROPH/DIAG INJ SC/IM: CPT

## 2021-02-05 PROCEDURE — 99207 PR NO CHARGE NURSE ONLY: CPT

## 2021-02-05 RX ADMIN — MEDROXYPROGESTERONE ACETATE 150 MG: 150 INJECTION, SUSPENSION INTRAMUSCULAR at 15:17

## 2021-02-05 NOTE — NURSING NOTE
Clinic Administered Medication Documentation      Depo Provera Documentation    URINE HCG: not indicated    Depo-Provera Standing Order inclusion/exclusion criteria reviewed.   Patient meets: inclusion criteria     BP: Data Unavailable  LAST PAP/EXAM:   Lab Results   Component Value Date    PAP NIL 03/09/2020       Prior to injection, verified patient identity using patient's name and date of birth. Medication was administered. Please see MAR and medication order for additional information.     Was entire vial of medication used? Yes  Vial/Syringe: Single dose vial  Expiration Date:  06/2022    Patient instructed to remain in clinic for 15 minutes and report any adverse reaction to staff immediately .  NEXT INJECTION DUE: 4/23/21 - 5/7/21    Josefina Rodriguez [facility]

## 2021-04-23 ENCOUNTER — ALLIED HEALTH/NURSE VISIT (OUTPATIENT)
Dept: FAMILY MEDICINE | Facility: OTHER | Age: 31
End: 2021-04-23
Payer: COMMERCIAL

## 2021-04-23 VITALS — SYSTOLIC BLOOD PRESSURE: 124 MMHG | DIASTOLIC BLOOD PRESSURE: 74 MMHG

## 2021-04-23 DIAGNOSIS — N92.6 IRREGULAR MENSES: Primary | ICD-10-CM

## 2021-04-23 PROCEDURE — 96372 THER/PROPH/DIAG INJ SC/IM: CPT

## 2021-04-23 RX ADMIN — MEDROXYPROGESTERONE ACETATE 150 MG: 150 INJECTION, SUSPENSION INTRAMUSCULAR at 15:47

## 2021-04-23 NOTE — PROGRESS NOTES
Clinic Administered Medication Documentation      Depo Provera Documentation    URINE HCG: not indicated    Depo-Provera Standing Order inclusion/exclusion criteria reviewed.   Patient meets: inclusion criteria     BP: 124/74  LAST PAP/EXAM:   Lab Results   Component Value Date    PAP NIL 03/09/2020       Prior to injection, verified patient identity using patient's name and date of birth. Medication was administered. Please see MAR and medication order for additional information.     Was entire vial of medication used? Yes  Vial/Syringe: Single dose vial  Expiration Date:  8/2022    Patient instructed to remain in clinic for 15 minutes and report any adverse reaction to staff immediately .  NEXT INJECTION DUE: 7/9/21 - 7/23/21  Left glute per patient request   Shannan Presley CMA

## 2021-07-09 ENCOUNTER — ALLIED HEALTH/NURSE VISIT (OUTPATIENT)
Dept: FAMILY MEDICINE | Facility: OTHER | Age: 31
End: 2021-07-09
Payer: COMMERCIAL

## 2021-07-09 DIAGNOSIS — N92.6 IRREGULAR MENSES: Primary | ICD-10-CM

## 2021-07-09 PROCEDURE — 96372 THER/PROPH/DIAG INJ SC/IM: CPT | Performed by: OBSTETRICS & GYNECOLOGY

## 2021-07-09 PROCEDURE — 99207 PR NO CHARGE NURSE ONLY: CPT

## 2021-07-09 RX ADMIN — MEDROXYPROGESTERONE ACETATE 150 MG: 150 INJECTION, SUSPENSION INTRAMUSCULAR at 15:45

## 2021-07-09 NOTE — NURSING NOTE
Clinic Administered Medication Documentation    Administrations This Visit     medroxyPROGESTERone (DEPO-PROVERA) injection 150 mg     Admin Date  07/09/2021 Action  Given Dose  150 mg Route  Intramuscular Site  Left Gluteus Raghu Administered By  Josefina Rodriguez MA    Ordering Provider: Agbeh, Cephas Mawuena, MD    NDC: 0000-2276-76    Lot#: [id number]    : AMPHASTAR-SiVerion    Patient Supplied?: No                  Depo Provera Documentation    URINE HCG: not indicated    Depo-Provera Standing Order inclusion/exclusion criteria reviewed.   Patient meets: inclusion criteria     BP: Data Unavailable  LAST PAP/EXAM:   Lab Results   Component Value Date    PAP NIL 03/09/2020       Prior to injection, verified patient identity using patient's name and date of birth. Medication was administered. Please see MAR and medication order for additional information.     Was entire vial of medication used? Yes  Vial/Syringe: Single dose vial  Expiration Date:  2/23    Patient instructed to stay in clinic after the injection but patient declined.  NEXT INJECTION DUE: 9/24/21 - 10/8/21  Josefina Rodriguez [facility]

## 2021-07-13 ENCOUNTER — OFFICE VISIT (OUTPATIENT)
Dept: FAMILY MEDICINE | Facility: OTHER | Age: 31
End: 2021-07-13
Payer: COMMERCIAL

## 2021-07-13 VITALS
DIASTOLIC BLOOD PRESSURE: 70 MMHG | BODY MASS INDEX: 21.89 KG/M2 | RESPIRATION RATE: 20 BRPM | OXYGEN SATURATION: 98 % | SYSTOLIC BLOOD PRESSURE: 120 MMHG | HEART RATE: 82 BPM | TEMPERATURE: 97.8 F | WEIGHT: 131.4 LBS | HEIGHT: 65 IN

## 2021-07-13 DIAGNOSIS — M54.50 ACUTE LEFT-SIDED LOW BACK PAIN WITHOUT SCIATICA: Primary | ICD-10-CM

## 2021-07-13 PROCEDURE — 99203 OFFICE O/P NEW LOW 30 MIN: CPT | Performed by: PHYSICIAN ASSISTANT

## 2021-07-13 RX ORDER — CYCLOBENZAPRINE HCL 5 MG
5 TABLET ORAL 3 TIMES DAILY PRN
Qty: 60 TABLET | Refills: 1 | Status: SHIPPED | OUTPATIENT
Start: 2021-07-13 | End: 2022-02-08

## 2021-07-13 RX ORDER — NAPROXEN 500 MG/1
500 TABLET ORAL 2 TIMES DAILY WITH MEALS
Qty: 60 TABLET | Refills: 1 | Status: SHIPPED | OUTPATIENT
Start: 2021-07-13 | End: 2022-02-08

## 2021-07-13 ASSESSMENT — MIFFLIN-ST. JEOR: SCORE: 1308.16

## 2021-07-13 ASSESSMENT — PAIN SCALES - GENERAL: PAINLEVEL: EXTREME PAIN (8)

## 2021-07-13 NOTE — PROGRESS NOTES
Assessment & Plan       ICD-10-CM    1. Acute left-sided low back pain without sciatica  M54.5 cyclobenzaprine (FLEXERIL) 5 MG tablet     naproxen (NAPROSYN) 500 MG tablet       - Reports bilateral low back pain with occasional radicular symptoms down the lateral aspect of the left thigh  - Pain worse with lifting and bending.   - On exam, notable muscle tightness along the left lumbar paraspinal muscles. No midline tenderness, strength and sensation intact bilateral lower extremities, no concerning neurological findings, seems to be msk   - History and exam suggestive of acute episode of low back pain, likely muscle spasm aggravated by repetitive lifting at work.  - Start Naproxen 500 MG twice daily x 7 days, then as needed   - Start Flexeril 5 MG up to three times daily as needed  - Discussed proper use of medications and potential side effects, including drowsiness from the muscle relaxer and no driving on this medication   - Educated patient on stretches and exercises she can do daily at home to help with low back pain and muscle spasm  - Educated patient on proper lifting technique while at work to avoid future pain flares  - Hand outs given   - Discussed warning signs that would warrant return to clinic/ED       Review of the result(s) of each unique test - None   Diagnosis or treatment significantly limited by social determinants of health - None     30 minutes spent on the date of the encounter doing chart review, history and exam, documentation and further activities as noted above    The patient indicates understanding of these issues and agrees with the plan.    Return in about 1 month (around 8/13/2021) for if not improving.    IBakari PA-C,  was present with the PA student who participated in the service and in the documentation of the note.  I have verified the history and personally performed the physical exam and medical decision making.  I agree with the assessment and plan of  care as documented in the note.     JORGE Carmona-S2  [employer]     ZORA JacksonC  M [facility] RAKESH Jones is a 31 year old who presents for the following health issues  accompanied by her spouse:    History of Present Illness       Back Pain:  She presents for follow up of back pain. Patient's back pain is a new problem.    Original cause of back pain: lifting  First noticed back pain: more than 1 month ago  Patient feels back pain: comes and goesLocation of back pain:  Left lower back  Description of back pain: cramping  Back pain spreads: left buttocks and left thigh    Since patient first noticed back pain, pain is: always present, but gets better and worse  Does back pain interfere with her job:  Yes  On a scale of 1-10 (10 being the worst), patient describes pain as:  8  What makes back pain worse: certain positions and standing  Acupuncture: not tried  Acetaminophen: not tried  Activity or exercise: not tried  Heat: not tried  Massage: not tried  Physical Therapy: not tried  Rest: helpful  TENS unit: not tried  Topical pain relievers: not tried  Other healthcare providers patient is seeing for back pain: None    She eats 0-1 servings of fruits and vegetables daily.She consumes 1 sweetened beverage(s) daily.She exercises with enough effort to increase her heart rate 9 or less minutes per day.  She exercises with enough effort to increase her heart rate 3 or less days per week.   She is taking medications regularly.       Back Pain  Onset/Duration: 1 month   Description:   Location of pain: low back left  Character of pain: cramping and intermittent  Pain radiation: radiates into the left buttocks, radiates into the left leg and radiates into the left foot  New numbness or weakness in legs, not attributed to pain: no   Intensity: Currently 8/10  Progression of Symptoms: worsening  History:   Specific cause:  "work-related  Pain interferes with job: YES  History of back problems: no prior back problems  Any previous MRI or X-rays: None  Sees a specialist for back pain: No  Alleviating factors:   Improved by: acetaminophen (Tylenol), rest and sitting    Precipitating factors:  Worsened by: Lifting, Bending and working  Therapies tried and outcome: acetaminophen (Tylenol) and rest    - Bilateral low back pain with occasional radicular symptoms into lateral left thigh  - Patient works part-time at [employer] and is often lifting boxes repetitively during shifts  - Notes that pain is worse on the days that she works and better on days when she can rest  - Has tried one single dose of Tylenol and found it beneficial for her pain    Accompanying Signs & Symptoms:  Risk of Fracture: None  Risk of Cauda Equina: None  Risk of Infection: None  Risk of Cancer: None  Risk of Ankylosing Spondylitis: Onset at age <35, male, AND morning back stiffness  no         Review of Systems   Constitutional, HEENT, cardiovascular, pulmonary, gi and gu systems are negative, except as otherwise noted.      Objective    /70   Pulse 82   Temp 97.8  F (36.6  C) (Temporal)   Resp 20   Ht 1.645 m (5' 4.76\")   Wt 59.6 kg (131 lb 6.4 oz)   SpO2 98%   BMI 22.03 kg/m    Body mass index is 22.03 kg/m .  Physical Exam   GENERAL APPEARANCE: healthy, alert and no distress  EYES: Eyes grossly normal to inspection, PERRLA, conjunctivae and sclerae without injection or discharge, EOM intact    RESP: Lungs clear to auscultation - no rales, rhonchi or wheezes   CV: Regular rates and rhythm, normal S1 S2, no S3 or S4, no murmur, click or rub, no peripheral edema and peripheral pulses strong and symmetric bilaterally   MS: No musculoskeletal defects are noted and gait is age appropriate without ataxia   Low back: No lumbar midline tenderness, left lumbar paraspinal tenderness and muscle tightness to palpation, full ROM without pain  Left lower extremity: " strength intact, sensation intact  Right lower extremity: strength intact, sensation intact   SKIN: No suspicious lesions or rashes, hydration status appears adeuqate with normal skin turgor   BACK: No CVA tenderness  PSYCH: Alert and oriented x3; speech- coherent , normal rate and volume; able to articulate logical thoughts, able to abstract reason, no tangential thoughts, no hallucinations or delusions, mentation appears normal, Mood is euthymic. Affect is appropriate for this mood state and bright. Thought content is free of suicidal ideation, hallucinations, and delusions. Dress is adequate and upkept. Eye contact is good during conversation.       Diagnostics: None

## 2021-07-13 NOTE — PATIENT INSTRUCTIONS
Acute Back/Neck Pain: Self Care at Home Instructions  Conservative Treatment    Remaining active supports a quicker recovery, keep moving. (ex. Walking, increase time & speed as tolerated).    Bed rest is not recommended. Minimize sitting. Do not remain in one position for extended periods of time.    Maintain routine activity with attention to correct posture; stop aggravating activities.    Over-the-counter pain medications for short term symptom control. (See below)    Muscle relaxants are sometimes helpful for few days, but may cause drowsiness. (See below)    Cold packs or heat based upon your preference.    Most people improve within 2 weeks; most have significant improvement within 4 weeks.    If symptoms worsen or you experience numbness, contact your provider immediately.    Medications for Pain Relief  Recommended over-the-counter non-steroidal anti-inflammatories:       Naproxen Sodium (Aleve) 500 mg. two times per day as needed for pain - Take every day for 7 days, then as needed     If you have been prescribed a muscle relaxant (*cyclobenzapine 5 mg.)  Take    - 1 tablet at bedtime  *May cause drowsiness. Do not drive when taking this medication.      Back Pain Exercises   Exercises that stretch and strengthen the muscles of your abdomen and spine can help prevent back problems. If your back and abdominal muscles are strong, you can maintain good posture and keep your spine in its correct position.     If your muscles are tight, take a warm shower or bath before doing the exercises. Exercise on a rug or mat. Stop doing any exercise that causes pain until you have talked with your provider.     These exercises are intended only as suggestions. Ask your provider or physical therapist to help you develop an exercise program. Check with your provider before starting the exercises. Ask your provider how many times a week you need to do the exercises.      Back Pain Exercises                                             Cat and camel: Get down on your hands and knees. Let your stomach sag, allowing your back to curve downward. Hold this position for 5 seconds. Then arch your back and hold for 5 seconds. Do 3 sets of 10.       Pelvic tilt: Lie on your back with your knees bent and your feet flat on the floor. Tighten your abdominal muscles and push your lower back into the floor. Hold this position for 5 seconds, then relax. Do 3 sets of 10.       Extension exercise: Lie face down on the floor for 5 minutes. If this hurts too much, lie face down with a pillow under your stomach. This should relieve your leg or back pain. When you can lie on your stomach for 5 minutes without a pillow, then you can continue with the rest of this exercise.     After lying on your stomach for 5 minutes, prop yourself up on your elbows for another 5 minutes. Lie flat again for 1 minute, then press down on your hands and extend your elbows while keeping your hips flat on the floor. Hold for 1 second and lower yourself to the floor. Repeat 10 times. Do 4 sets. Rest for 2 minutes between sets. You should have no pain in your legs when you do this, but it is normal to feel pain in your lower back. Do this several times a day.          Developed by [employer]   Published by [employer].   Last modified: 2009-02-08   Last reviewed: 2008-07-07   This content is reviewed periodically and is subject to change as new health information becomes available. The information is intended to inform and educate and is not a replacement for medical evaluation, advice, diagnosis or treatment by a healthcare professional.   Adult Health Advisor 2009.1 Index  Adult Health Advisor 2009.1 Credits     2009 [employer] and/or its affiliates. All Rights Reserved.         Patient Education     Back Exercises: Hip Rotator Stretch    To start, lie on your back with your knees bent and feet flat on the floor. Don t press your neck or lower back to the  floor. Breathe deeply. You should feel comfortable and relaxed in this position.    Rest your right ankle on your left knee.    Place a towel behind your left thigh, and use it to pull the knee toward your chest. Feel the stretch in your buttocks.    Hold for 30 to 60 seconds. Release.    Repeat 2 times.    Switch legs.     If there is any pain other than stretch in the knee or buttock, stop and contact your healthcare provider.  For your safety, check with your healthcare provider before starting an exercise program.    [employer] last reviewed this educational content on 3/1/2018    5069-8755 [employer]. All rights reserved. This information is not intended as a substitute for professional medical care. Always follow your healthcare professional's instructions.           Patient Education     Back Exercises: Lower Back Stretch    To start, sit in a chair with your feet flat on the floor. Shift your weight slightly forward. Relax, and keep your ears, shoulders, and hips aligned while you do the following:    Sit with your feet well apart.    Bend forward and touch the floor with the backs of your hands. Relax and let your body drop.    Hold for  20 seconds. Return to starting position.    Repeat  2 times.   Note: If you've had back or hip surgery, talk with your healthcare provider before doing this stretch.  [employer] last reviewed this educational content on 4/1/2020 2000-2021 [employer]. All rights reserved. This information is not intended as a substitute for professional medical care. Always follow your healthcare professional's instructions.           Patient Education     Back Exercises: Side Stretch    To start, sit in a chair with your feet flat on the floor. Shift your weight slightly forward to avoid rounding your back. Relax. Keep your ears, shoulders, and hips aligned:    Stretch your right arm overhead.    Slowly bend to the left. Don t twist your torso. Stay within your  pain limits.    Hold for 20 seconds. Return to starting position.    Repeat 2 to 5 times. Then, switch to the other side.  [employer] last reviewed this educational content on 3/1/2018    3118-3351 [employer]. All rights reserved. This information is not intended as a substitute for professional medical care. Always follow your healthcare professional's instructions.           Patient Education     Lumbar Stretch (Flexibility)    1. Lie on your back on the floor, with your knees bent and your feet flat on the floor. Don t press your neck or lower back to the floor.  2. Pull one knee up toward your chest. Clasp your hands under your thigh to help pull.  3. Hold for 30 to 60 seconds. Lower your leg back down to the floor.  4. Repeat 2 times, or as instructed.  5. Switch legs and repeat.  [employer] last reviewed this educational content on 3/1/2020    8878-9513 [employer]. All rights reserved. This information is not intended as a substitute for professional medical care. Always follow your healthcare professional's instructions.           Patient Education     Back Care Tips     Caring for your back  These are things you can do to prevent a recurrence of acute back pain and to reduce symptoms from chronic back pain:    Stay at a healthy weight. If you are overweight, losing weight will help most types of back pain.    Exercise is an important part of recovery from most types of back pain. The muscles behind and in front of the spine support the back. This means strengthening both the back muscles and the abdominal muscles will provide better support for your spine.     Swimming and brisk walking are good overall exercises to improve your fitness level.    Practice safe lifting methods (see below).    Practice good posture when sitting, standing, and walking. Don't sit for a long time. This puts more stress on the lower back than standing or walking.    Wear quality shoes with good arch  support. Foot and ankle alignment can affect back symptoms. Don't wear high heels.    Therapeutic massage can help relax the back muscles without stretching them.    During the first 24 to 72 hours after an acute injury or flare-up of chronic back pain, put an ice pack on the painful area for 20 minutes and then remove it for 20 minutes. Do thisover a period of 60 to 90 minutes, or several times a day. As a safety precaution, don't use a heating pad at bedtime. Sleeping on a heating pad can lead to skin burns or tissue damage.    You can alternate using ice and heat.  Medicines  Talk with your healthcare provider before using medicines, especially if you have other health problems or are taking other medicines.    You may use over-the-counter medicines, such as acetaminophen, ibuprofen, or naprosyn to control pain, unless your healthcare provider prescribed other pain medicine. Talk with your healthcare provider before taking any medicines if you have a chronic condition such as diabetes, liver or kidney disease, stomach ulcers, or digestive bleeding, or are taking blood thinners.    Be careful if you are given prescription pain medicines, opioids, or medicine for muscle spasm. They can cause drowsiness, and affect your coordination, reflexes, and judgment. Don't drive or operate heavy machinery while taking these types of medicines. Take prescription pain medicine only as prescribed by your healthcare provider.  Lumbar stretch  This simple stretch will help relax muscle spasm and keep your back more limber. If exercise makes your back pain worse, don t do it.    Lie on your back with your knees bent and both feet on the ground.    Slowly raise your left knee to your chest as you flatten your lower back against the floor. Hold for 5 seconds.    Relax and repeat the exercise with your right knee.    Do 10 of these exercises for each leg.  Safe lifting method    Don t bend over at the waist to lift an object off the  floor.  Instead, bend your knees and hips in a squat.     Keep your back and head upright    Hold the object close to your body, directly in front of you.    Straighten your legs to lift the object.     Lower the object to the floor in the reverse fashion.    If you must slide something across the floor, push it.    Posture tips  Sitting  Sit in chairs with straight backs or low-back support. Keep your knees lower than your hips, with your feet flat on the floor.  When driving, sit up straight. Adjust the seat forward so you are not leaning toward the steering wheel.  A small pillow or rolled towel behind your lower back may help if you are driving long distances.   Standing  When standing for long periods, shift most of your weight to one leg at a time. Switch legs every few minutes.   Sleeping  The best way to sleep is on your side with your knees bent. Put a low pillow under your head to support your neck in a neutral spine position. Don't use thick pillows that bend your neck to one side. Put a pillow between your legs to further relax your lower back. If you sleep on your back, put pillows under your knees to support your legs in a slightly flexed position. Use a firm mattress. If your mattress sags, replace it, or use a 1/2-inch plywood board under the mattress to add support.  Follow-up care  Follow up with your healthcare provider, or as advised.  If X-rays, a CT scan or an MRI scan were taken, they may be reviewed by a radiologist. You will be told of any new findings that may affect your care.  Call 911  Call 911 if any of the following occur:    Trouble breathing    Confusion    Very drowsy    Fainting or loss of consciousness    Rapid or very slow heart rate    Loss of  bowel or bladder control  When to seek medical advice  Call your healthcare provider right away if any of the following occur:    Pain becomes worse or spreads to your arms or legs    Weakness or numbness in one or both arms or  legs    Numbness in the groin area  [employer] last reviewed this educational content on 11/1/2019 2000-2021 [employer]. All rights reserved. This information is not intended as a substitute for professional medical care. Always follow your healthcare professional's instructions.

## 2021-07-16 ENCOUNTER — TELEPHONE (OUTPATIENT)
Dept: FAMILY MEDICINE | Facility: OTHER | Age: 31
End: 2021-07-16
Payer: COMMERCIAL

## 2021-07-16 NOTE — TELEPHONE ENCOUNTER
Please fax to [employer], fax [phone] listed on bottom of form.  Thank you

## 2021-07-16 NOTE — TELEPHONE ENCOUNTER
Reason for Call:  Form, our goal is to have forms completed with 72 hours, however, some forms may require a visit or additional information.    Type of letter, form or note:  medical statement    Who is the form from?: [place] [employer] (if other please explain)    Where did the form come from: Patient or family brought in       What clinic location was the form placed at?: [facility] [phone]    Where the form was placed: Team C Box/Folder    What number is listed as a contact on the form?:  [phone]       Additional comments:  Spouse dropped off form.  States they need this ASAP.  Bakari Patel out of clinic next week, they request someone else fill it out.  Thank you.    Call taken on 7/16/2021 at 4:23 PM by Anna Marie Sanz

## 2021-07-19 NOTE — TELEPHONE ENCOUNTER
Patient needs to come in and to sign the top of the form in the highlighted areas.    After review of the providers note stated 13 July 2021 I do not see any indication about the patient having time off of work.      I am not certain exactly how to fill this out for the patient without an office visit.  I will place it back in the out of office been in the pod.  I am uncertain as to what exactly the patient wants us to do with this form given my current review of the notes.    Please contact the patient to find out what the desire is.  She does need to sign this form to give us permission to send it to her employer.    Electronically signed:    Dariusz Virgen PA-C

## 2021-07-20 NOTE — TELEPHONE ENCOUNTER
Spoke to patient / spouse via  services. They were very upset about making a visit to discuss. I tried to stress to them that CD[employer] can review when she returns if need for visit. Offered to make appt in [place] but declined. 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 They scheduled for 8/2 for a follow up.     Debo Hendrickson MA

## 2021-07-20 NOTE — TELEPHONE ENCOUNTER
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

## 2021-07-26 NOTE — TELEPHONE ENCOUNTER
Attempted to call patient via interpretor services but went to voicemail.     Same for 's cell. Will leave message for him to return my call.     Bakari Murray-KILO Patel  [facility]

## 2021-07-26 NOTE — TELEPHONE ENCOUNTER
Spoke with patient via interpretor services.     This is all about her back. So missed a day of work, had to take medications, I gave her. Had lots of pain including into feet.     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 Been home for 2 weeks but needs form to go back. But needs medication. When sitting and getting up has pain. Medication doesn't make tired. Stopped work 7/9/21. Would like light duty.     Call was accidentally (?) disconnected by patient.     Team   I have completed her form. However she needs to fill out the highlighted part before we can fax. Please call her back. Also let her know I will need to see her every 2 weeks until she can go back without restrictions. So her 8/2/21 appointment should be moved to ~8/10/21.     Form placed in MA task.    Bakari Jiménez PA-C  [facility]

## 2021-07-28 NOTE — TELEPHONE ENCOUNTER
Patient came to clinic to sign the form.  A copy is placed in Team C form bin/box.   Also her appointment was changed from 08-02 to 08-10.  Thank you.

## 2021-07-28 NOTE — TELEPHONE ENCOUNTER
Spoke with patient via . Patient is going to come to clinic to sign form. Form is to be copied for pt and placed in team c bin for faxing.  Form and note for  placed at . Jessica ZULUAGA CMA

## 2021-08-10 ENCOUNTER — OFFICE VISIT (OUTPATIENT)
Dept: FAMILY MEDICINE | Facility: OTHER | Age: 31
End: 2021-08-10
Payer: COMMERCIAL

## 2021-08-10 VITALS
OXYGEN SATURATION: 100 % | TEMPERATURE: 97.3 F | HEART RATE: 108 BPM | BODY MASS INDEX: 23.33 KG/M2 | WEIGHT: 139.2 LBS | SYSTOLIC BLOOD PRESSURE: 132 MMHG | DIASTOLIC BLOOD PRESSURE: 80 MMHG | RESPIRATION RATE: 18 BRPM

## 2021-08-10 DIAGNOSIS — M54.50 ACUTE LEFT-SIDED LOW BACK PAIN WITHOUT SCIATICA: ICD-10-CM

## 2021-08-10 DIAGNOSIS — M25.511 ACUTE PAIN OF RIGHT SHOULDER: Primary | ICD-10-CM

## 2021-08-10 PROCEDURE — 99214 OFFICE O/P EST MOD 30 MIN: CPT | Performed by: PHYSICIAN ASSISTANT

## 2021-08-10 ASSESSMENT — PAIN SCALES - GENERAL: PAINLEVEL: MODERATE PAIN (5)

## 2021-08-10 NOTE — PATIENT INSTRUCTIONS
- Try to lift with other hand for 2 weeks     - Stretching 2-3x/day     - Ice in 15-20 min intervals 3-4x/day       Patient Education     Faire shahzad étirements vers le haut pour rétablir la souplesse de votre épaule    Cet étirement vers le haut peut vous aider à rétablir le souplesse de votre épaule et soulager la douleur au daphnie du temps. Quand vous vous étirez, assurez-vous de respirer profondément. Et suivez toute instruction spéciale de votre médecin ou physiothérapeute.  1. Levez la main alfonzo le côté que vous voulez étirer aussi haut que possible. Puis, saisissez une surface stable, par exemple une bibliothèque ou un cadre de porte, avec la même main.  2. En gardant votre bras droit, baissez votre corps en fléchissant les genoux. Arrêtez quand vous sentez l'étirement dans l'épaule. Essayez de maintenir l'étirement pendant5 secondes.  3. Entraînez vous pour atteindre3 séries d'étirements.3 fois par jour. Entraînez-vous jusqu'à maintenir l'étirement [id number] secondes.  Remarque : Votre dos devrait rester droit. Pour améliorer l'étirement au daphnie du temps, essayez de fléchir sadia genoux et de descendre plus bas. Ou darcie, élevez votre bras plus haut au début de l'étirement.     Épaule bloquée  L'épaule bloquée, ou gelée, est l'autre nom de la capsulite rétractile (adhesive capsulitis) de l'épaule, orly entraîne la restriction shahzad mouvements de l'épaule. Si vous avez une épaule bloquée, cet étirement peut causer un inconfort, en particulier quand vous commencez les exercices. Quelques mois peuvent passer sheree que vous atteigniez les résultats voulus. Mais, une fois votre épaule guérie, esperanza ne devient presque plus nahomy laura. Donc, respectez scrupuleusement votre programme d'étirement. Si vous avez shahzad questions, assurez-vous d'en parler à votre médecin.     9979-7472 [employer]. Tous droits [employer]. Cette information ne vise pas à remplacer shahzad soins médicaux professionnels. Suivez toujours  les instructions de votre [facility] de la santé.           Patient Education     Douleur à l'épaule de cause incertaine  Les douleurs à l épaule peuvent avoir de nombreuses causes. La douleur provient souvent sanya structures orly entourent l articulation de l épaule. Il s agit de la capsule articulaire, sanya ligaments, les tendons, les muscles, et la bourse. La douleur peut aussi provenant du cartilage contenu dans l articulation. Le cartilage peut devenu usé ou être endommagé. Il est important de savoir ce orly cause votre douleur de sorte que le fournisseur de soins de santé puisse utiliser le traitement orly convient. Mais il est parfois difficile de trouver la cause exacte de la douleur à l épaule. Vous devrez peut-être consulter un spécialiste (orthopédiste). Sanya examens spéciaux peuvent également s avérer nécessaires, comme un tomodensitogramme ou une MRI. Le fournisseur peut avoir besoin d utiliser sanya outils spéciaux pour regarder à l intérieur de l articulation (arthroscopie).  La douleur à l épaule peut être traitée à l aide d une écharpe ou d un dispositif empêchant votre épaule de bouger. Vous pouvez prendre sanya anti-inflammatoires, comme de l ibuprofène, pour soulager la douleur. Vous devrez peut-être faire sanya exercices spéciaux pour soigner votre épaule. Effectuer un suivi auprès d un spécialiste si la douleur est sévère ou si esperanza ne s en va pas au bout de quelques semaines.  Soins à domicile  Suivez fernando conseils quand vous vous soignez chez vous :    Si on vous a fourni une écharpe, laissez-la en place pendant la durée conseillée par votre fournisseur de soins de santé. Si vous n'êtes pas sûr de savoir combien de temps vous devez la foster, demandez conseil. Si l écharpe devient lâche, ajustez-la de sorte que votre sheree-bras soit parallèle avec le sol. Vous devriez sentir que votre épaule est darcie soutenue.    Appliquez un bloc réfrigérant alfonzo la région blessée pendant 20 minutes toutes les 1 à 2  heures pendant la première journée. Vous pouvez faire votre propre bloc réfrigérant en mettant shahzad glaçons dans un sac en plastique. Enveloppez le sac dans une serviette fine. Continuez d'utiliser shahzad blocs réfrigérants 3 à 4 fois par jour pendant les 2 prochains jours. Puis, utilisez le bloc selon sadia besoins pour soulager la douleur et le gonflement.    Vous pouvez prendre de l'acétaminophène ou de l'ibuprofène pour contrôler la douleur, à moins qu'un autre analgésique vous ait été prescrit. Si vous avez une maladie chronique du foie ou du rein, parlez avec votre fournisseur de soins de santé sheree de prendre fernando médicaments. Discutez aussi avec votre fournisseur de soins de santé si vous avez déjà eu un ulcère à l'estomac ou un saignement gastro-intestinal.    Il se peut que la douleur à l épaule semble s aggraver pendant la nuit, quand moins de choses sont susceptibles de vous faire oublier la douleur. Si vous dormez alfonzo le côté, essayez de ne pas exercer de poids alfonzo l épaule orly vous fait mal. En mettant shahzad oreilles derrière vous, vous pourrez vous empêcher de rouler alfonzo cette épaule jocelyn votre sommeil.     Les articulations de l épaule et du coude peuvent devenir raides si elles restent trop longtemps dans l'écharpe. Vous devriez commencer à faire shahzad exercices d'amplitude articulaire environ 7 à 10 jours après la blessure. Consultez votre fournisseur pour savoir quel type d'exercice vous devriez faire et quand vous devriez commencer.    Vous pouvez retirer l écharpe pour vous doucher ou vous baigner.  Soins de suivi  Effectuez le suivi avec votre fournisseur de soins de santé si votre état ne s'améliore pas dans un délai de 5 jours.  Quand evan-mony chercher à obtenir un charmaine médical  Appelez votre fournisseur de soins de santé immédiatement si ce orly suit se produit :    La douleur ou le gonflement s'aggravent ou continuent pendant plus de deux ou trois jours    Votre main ou sadia doigts deviennent  froids, bleus, engourdis ou picotent    Nombreuses ecchymoses alfonzo l'épaule ou l'sheree-bras    Difficulté à bouger votre main ou sadia doigts    Faiblesse dans la main ou les doigts    Votre épaule devient courbaturée    Sensation que votre épaule se déboîte    Capacité réduite à faire sadia activités quotidiennes    [id number] [employer]. Tous droits [employer]. Cette information ne vise pas à remplacer shahzad soins médicaux professionnels. Suivez toujours les instructions de votre professionnel de la santé.           Patient Education     Exercices pour rétablir la souplesse de votre épaule : rotation interne    Cet étirement peut vous aider à rétablir le souplesse de votre épaule et soulager la douleur au daphnie du temps. Quand vous vous étirez, assurez-vous de respirer profondément. Et suivez toute instruction spéciale de votre médecin ou physiothérapeute.  1. En position assise, mettez le bras du côté que vous voulez étirer vers le milieu de votre dos. La paume de votre main doit être ouverte vers l'extérieur.  2. Mettez votre autre main en coupe sous la main que vous tenez dans votre dos. Poussez doucement la main en coupe jusqu'à ce que vous sentiez l'étirement dans votre épaule. Essayez de maintenir l'étirement pendant5 secondes.  3. Entraînez vous pour atteindre3 séries d'étirements.3 fois par jour. Entraînez-vous jusqu'à maintenir l'étirement [id number] secondes.  Remarque : Maintenez votre dos en position droite. Si votre main ne peut pas atteindre le milieu de votre dos, ibeth n'est pas important. Au lieu de ibeth, commencez l'étirement en plaçant votre main aussi près que possible du milieu du dos.     Épaule bloquée  L'épaule bloquée, ou gelée, est l'autre nom de la capsulite rétractile (adhesive capsulitis) de l'épaule, orly entraîne la restriction shahzad mouvements de l'épaule. Si vous avez une épaule bloquée, cet étirement peut causer un inconfort, en particulier quand vous commencez les exercices.  Quelques mois peuvent passer sheree que vous atteigniez les résultats voulus. Mais, une fois votre épaule guérie, esperanza ne devient presque plus heikeis bloquée. Donc, respectez scrupuleusement votre programme d'étirement. Si vous avez shahzad questions, assurez-vous d'en parler à votre médecin.     [id number] [employer]. Tous droits réserv[employer]. Cette information ne vise pas à remplacer shahzad soins médicaux professionnels. Suivez toujours les instructions de votre professionnel de la santé.           Patient Education     Exercises for Shoulder Flexibility: Adduction (Reaching Across)    This stretch can help restore shoulder flexibility and relieve pain over time. When stretching, be sure to breathe deeply. And follow any special instructions from your doctor or physical therapist:  1. Put the hand from the side you want to stretch on your opposite shoulder. Your elbow should point away from your body. Try to raise your elbow as close to shoulder height as you can.  2. With your other hand, push the raised elbow toward the opposite shoulder. Avoid turning your head. Stop when you feel the stretch. Try to hold the stretch for 5 seconds.  3. Work up to doing 3 sets of this stretch, 3 times a day. Work up to holding the stretch for 30 to 60 seconds.  Note: Be sure to push your elbow across your chest, not up toward your chin. Over time, try to push your elbow farther across your chest to enhance the stretch.  Frozen shoulder is another name for adhesive capsulitis, which causes restricted movement in the shoulder. If you have frozen shoulder, this stretch may cause discomfort, especially when you first get started. A few months may pass before you achieve the results you want. Once your shoulder heals, it rarely becomes frozen again. So stick to your stretching program. If you have any questions, be sure to ask your doctor.  [employer] last reviewed this educational content on 3/1/2018    9542-7878 [employer]. All rights reserved. This information is not intended as a substitute for professional medical care. Always follow your healthcare professional's instructions.           Patient Education     Shoulder, Upper Back, and Arm Exercise: Overhead Arm Stretch   To start, stand tall with your ears, shoulders, and hips in line. Your feet should be slightly apart, positioned just under your hips. Focus your eyes directly in front of you. Stay in this position for a few seconds before starting the exercise. This helps increase your awareness of proper posture. You can also do this exercise sitting up in a sturdy chair. Before doing this exercise, talk with your healthcare provider to make sure it's safe for you to do.        Reach overhead and slightly back with both arms. Keep your shoulders and neck aligned and your elbows behind your shoulders:     With your palms facing the ceiling, turn your fingers inward. You can also do this exercise holding weights if directed by your healthcare provider or physical therapist.    Take a deep breath. Breathe out and lower your elbows toward your buttocks. Hold for up to 5 seconds, then return to starting position.    Repeat 3 times, or as directed by your healthcare provider or physical therapist.  [employer] last reviewed this educational content on 7/1/2020 2000-2021 [employer]. All rights reserved. This information is not intended as a substitute for professional medical care. Always follow your healthcare professional's instructions.           Patient Education     Shoulder Clock Exercise    To start, stand tall with your ears, shoulders, and hips in line. Your feet should be slightly apart, positioned just under your hips. Focus your eyes directly in front of you.  this position for a few seconds before starting your exercise. This helps increase your awareness of proper posture.    Imagine that your right shoulder is the center of a clock.  With the outer point of your shoulder, roll it around to slowly trace the outer edge of the clock.    Move clockwise first, then counterclockwise.    Repeat 3 to 5 times. Switch shoulders.  [employer] last reviewed this educational content on 3/1/2018    [id number] [employer]. All rights reserved. This information is not intended as a substitute for professional medical care. Always follow your healthcare professional's instructions.           Patient Education     Shoulder Shrug Exercise    To start, sit in a chair with your feet flat on the floor. Shift your weight slightly forward so you don't round your back. Relax. Keep your ears, shoulders, and hips aligned:    Raise both of your shoulders as high as you can, as if you were trying to touch them to your ears. Keep your head and neck still and relaxed.    Hold for a count of 10. Release.    Repeat 5 times.  For your safety, check with your healthcare provider before starting an exercise program.  [employer] last reviewed this educational content on 7/1/2020 2000-2021 [employer]. All rights reserved. This information is not intended as a substitute for professional medical care. Always follow your healthcare professional's instructions.           Patient Education     Shoulder Squeeze Exercise    To start, sit in a chair with your feet flat on the floor. Shift your weight slightly forward so you don't round your back. Relax. Keep your ears, shoulders, and hips aligned:    Raise your arms to shoulder height, elbows bent and palms forward.    Move your arms back, squeezing your shoulder blades together.    Hold for 10 seconds. Return to starting position.     Repeat 5 times.   For your safety, check with your healthcare provider before starting an exercise program.  [employer] last reviewed this educational content on 7/1/2020 2000-2021 [employer]. All rights reserved. This information is not intended as a substitute for  professional medical care. Always follow your healthcare professional's instructions.           Patient Education     Shoulder Abduction (Flexibility)    14. Stand up straight or lie on your back on the floor with legs straight. Hold a broomstick horizontally. Cup the top of the broomstick with your right hand. Hold the broomstick just above the broom with your left hand, palm facing down.  15. Push the broomstick to the right with your left hand, raising your right arm up. Raise it only as high as feels comfortable.  16. Hold for a few seconds. Return to the starting position.  17. Repeat 3 to 5 times, or as instructed. Build up to holding each stretch for 10 to 30 seconds.  18. Switch sides and repeat if instructed.     Tip: If you don t have a broom, you can also do this exercise with a cane, mop, or yardstick.   [employer] last reviewed this educational content on 2/1/2020 2000-2021 [employer]. All rights reserved. This information is not intended as a substitute for professional medical care. Always follow your healthcare professional's instructions.

## 2021-08-10 NOTE — PROGRESS NOTES
Assessment & Plan     ICD-10-CM    1. Acute pain of right shoulder  M25.511    2. Acute left-sided low back pain without sciatica  M54.5      1. Shoulder pain   - Seems patient is just out of shape and recently returned to workforce in a physical job, needs to all around work on stretching   - Reports 1 week of right shoulder pain since returning to work after back issues, was moved from lifting heavy things to small light packages   - Pain seems muscular in nature near supraspinatus   - Recommend RICE protocol with ice, rest, and stretching      Discussed all these at length      Hand out given      Can continue Naproxen, discussed will help with inflammation in her shoulder, reviewed use and side effects, still has some at home and there is a refill on file   - Should try to lift with other hand for a few weeks as well (her kids and at work)   - Recheck 2 weeks if not improving     2. Back pain   - Resolved, returned to work   - Continues with back stretches and proper lifting         Review of the result(s) of each unique test - None   Diagnosis or treatment significantly limited by social determinants of health - None     30 minutes spent on the date of the encounter doing chart review, history and exam, documentation and further activities as noted above    The patient indicates understanding of these issues and agrees with the plan.    Due to language barrier, an  was present via [employer] during the history-taking and subsequent discussion with this patient.    Return in about 2 weeks (around 8/24/2021) for if not improving.    KLIO [facility] [place]    Subjective   Go-doll-vee is a 31 year old who presents for the following health issues     HPI     Back Pain  Onset/Duration: 1.5 months   Description:   Location of pain: low back right  Character of pain: cramping  Pain radiation: radiates into the left buttocks  New numbness or weakness in legs, not  attributed to pain: no   Intensity:  moderate  Progression of Symptoms: same  History:   Specific cause: work-related  Pain interferes with job: YES  History of back problems: no prior back problems  Any previous MRI or X-rays: None  Sees a specialist for back pain: No  Alleviating factors:   Improved by: rest    Precipitating factors:  Worsened by: Bending, Standing and Sitting  Therapies tried and outcome: naproxen and flexeril helping    - Improved   - Now in right shoulder  - When laying down and trying to get up or when standing too long   - Return to work last week   - Right after returning to work   - Right handed   - No stretching before work   - Not lifting heavy stuff anymore   - Stayed home for 2 weeks   - Talked to HR, won't be able to lift heavy so HR changed and lifting only light objects   - Work or home same pain   - Triggered by movement or lifting       Plan from last visit 7/13/21  - Reports bilateral low back pain with occasional radicular symptoms down the lateral aspect of the left thigh  - Pain worse with lifting and bending.   - On exam, notable muscle tightness along the left lumbar paraspinal muscles. No midline tenderness, strength and sensation intact bilateral lower extremities, no concerning neurological findings, seems to be msk   - History and exam suggestive of acute episode of low back pain, likely muscle spasm aggravated by repetitive lifting at work.  - Start Naproxen 500 MG twice daily x 7 days, then as needed   - Start Flexeril 5 MG up to three times daily as needed  - Discussed proper use of medications and potential side effects, including drowsiness from the muscle relaxer and no driving on this medication   - Educated patient on stretches and exercises she can do daily at home to help with low back pain and muscle spasm  - Educated patient on proper lifting technique while at work to avoid future pain flares  - Hand outs given   - Discussed warning signs that would warrant  return to clinic/ED         Review of Systems   Constitutional, HEENT, cardiovascular, pulmonary, gi and gu systems are negative, except as otherwise noted.      Objective    /80   Pulse 108   Temp 97.3  F (36.3  C) (Temporal)   Resp 18   Wt 63.1 kg (139 lb 3.2 oz)   SpO2 100%   BMI 23.33 kg/m    Body mass index is 23.33 kg/m .  Physical Exam   GENERAL APPEARANCE: healthy, alert and no distress  EYES: Eyes grossly normal to inspection, PERRLA, conjunctivae and sclerae without injection or discharge, EOM intact   MS:   Right shoulder: Normal ROM but painful with external rotation, tender along area of supraspinatus, no edema, CMS intact, normal sensory exam, normal strength, negative Neer's, negative belly press, negative lift-off, positive hawking's   Right elbow: Normal ROM, non-tender, no edema, CMS intact, normal sensory exam, normal strength  No musculoskeletal defects are noted and gait is age appropriate without ataxia   SKIN: No suspicious lesions or rashes, hydration status appears adeuqate with normal skin turgor   NEURO: Strength 5+ bilateral lower extremities, sensation intact in distal bilateral lower extremities, mentation- intact, speech- normal,   BACK: No CVA tenderness, no paralumbar tenderness, no midline tenderness, normal ROM   PSYCH: Alert and oriented x3; speech- coherent , normal rate and volume; able to articulate logical thoughts, able to abstract reason, no tangential thoughts, no hallucinations or delusions, mentation appears normal, Mood is euthymic. Affect is appropriate for this mood state and bright. Thought content is free of suicidal ideation, hallucinations, and delusions. Dress is adequate and upkept. Eye contact is good during conversation.       Diagnostics: None

## 2021-09-24 ENCOUNTER — ALLIED HEALTH/NURSE VISIT (OUTPATIENT)
Dept: FAMILY MEDICINE | Facility: OTHER | Age: 31
End: 2021-09-24
Payer: COMMERCIAL

## 2021-09-24 DIAGNOSIS — Z30.9 CONTRACEPTIVE MANAGEMENT: Primary | ICD-10-CM

## 2021-09-24 PROCEDURE — 99207 PR NO CHARGE NURSE ONLY: CPT

## 2021-09-24 PROCEDURE — 96372 THER/PROPH/DIAG INJ SC/IM: CPT | Performed by: FAMILY MEDICINE

## 2021-09-24 RX ADMIN — MEDROXYPROGESTERONE ACETATE 150 MG: 150 INJECTION, SUSPENSION INTRAMUSCULAR at 16:12

## 2021-09-24 NOTE — PROGRESS NOTES
Clinic Administered Medication Documentation    Administrations This Visit     medroxyPROGESTERone (DEPO-PROVERA) injection 150 mg     Admin Date  09/24/2021 Action  Given Dose  150 mg Route  Intramuscular Site  Right Gluteus Raghu Administered By  Sydnie Senior MA    Ordering Provider: Agbeh, Cephas Mawuena, MD    Patient Supplied?: No                  Depo Provera Documentation    URINE HCG: not indicated    Depo-Provera Standing Order inclusion/exclusion criteria reviewed.   Patient meets: inclusion criteria     BP: Data Unavailable  LAST PAP/EXAM:   Lab Results   Component Value Date    PAP NIL 03/09/2020       Prior to injection, verified patient identity using patient's name and date of birth. Medication was administered. Please see MAR and medication order for additional information.     Was entire vial of medication used? Yes, RIGHT GLUT  Vial/Syringe: Single dose vial  Expiration Date:  4/23    Patient instructed to stay in clinic after the injection but patient declined.  NEXT INJECTION DUE: 12/10/21 - 12/24/21

## 2021-12-15 ENCOUNTER — APPOINTMENT (OUTPATIENT)
Dept: INTERPRETER SERVICES | Facility: CLINIC | Age: 31
End: 2021-12-15
Payer: COMMERCIAL

## 2021-12-15 ENCOUNTER — TELEPHONE (OUTPATIENT)
Dept: FAMILY MEDICINE | Facility: OTHER | Age: 31
End: 2021-12-15
Payer: COMMERCIAL

## 2021-12-15 NOTE — TELEPHONE ENCOUNTER
Attempted to reach the patient with the following information.  Left message for patient to return call to clinic.     Patient is scheduled for Depo injection on Friday and the order is . Patient needs an appointment for updated order.    Jessica ZULUAGA, [facility]       Would provider give one time order for patient or does she need to schedule appointment.

## 2021-12-17 ENCOUNTER — ALLIED HEALTH/NURSE VISIT (OUTPATIENT)
Dept: FAMILY MEDICINE | Facility: OTHER | Age: 31
End: 2021-12-17
Payer: COMMERCIAL

## 2021-12-17 DIAGNOSIS — Z30.42 ENCOUNTER FOR SURVEILLANCE OF INJECTABLE CONTRACEPTIVE: Primary | ICD-10-CM

## 2021-12-17 PROCEDURE — 96372 THER/PROPH/DIAG INJ SC/IM: CPT | Performed by: FAMILY MEDICINE

## 2021-12-17 PROCEDURE — 99207 PR NO CHARGE NURSE ONLY: CPT

## 2021-12-17 RX ORDER — MEDROXYPROGESTERONE ACETATE 150 MG/ML
150 INJECTION, SUSPENSION INTRAMUSCULAR
Status: DISCONTINUED | OUTPATIENT
Start: 2021-12-17 | End: 2022-02-08

## 2021-12-17 RX ADMIN — MEDROXYPROGESTERONE ACETATE 150 MG: 150 INJECTION, SUSPENSION INTRAMUSCULAR at 16:46

## 2021-12-17 NOTE — PROGRESS NOTES
Clinic Administered Medication Documentation    Administrations This Visit     medroxyPROGESTERone (DEPO-PROVERA) injection 150 mg     Admin Date  12/17/2021 Action  Given Dose  150 mg Route  Intramuscular Site  Left Ventrogluteal Administered By  Trista Rm CMA    Ordering Provider: Dayami Ramirez MD    NDC: 38914-998-74    Lot#: [id number]    : [facility]    Patient Supplied?: No                Trista Rm CMA

## 2021-12-17 NOTE — TELEPHONE ENCOUNTER
Attempted to reach the patient with the following information.  Left message for patient to return call to clinic.     Patient is scheduled for Depo injection on Friday and the order is . Patient needs an appointment for updated order.    Jessica ZULUAGA CMA

## 2022-02-04 ENCOUNTER — TELEPHONE (OUTPATIENT)
Dept: FAMILY MEDICINE | Facility: OTHER | Age: 32
End: 2022-02-04
Payer: COMMERCIAL

## 2022-02-04 NOTE — TELEPHONE ENCOUNTER
Reason for Call:  Other appointment    Detailed comments: PT HAD TO MISS APPT ON 2/4 AND WOULD LIKE TO RESCHEDULE FOR THIS MONTH WITH DR PHELPS, NOTHING AVAIL UNTIL MARCH. PT WANTS TO GET BACK ON DEPO SCHEDULE     Phone Number Patient can be reached at: Home number on file [phone] (home)    Best Time: ANYTIME    Can we leave a detailed message on this number? YES    Call taken on 2/4/2022 at 3:35 PM by Marcelle Vazquez

## 2022-02-08 ENCOUNTER — OFFICE VISIT (OUTPATIENT)
Dept: FAMILY MEDICINE | Facility: OTHER | Age: 32
End: 2022-02-08
Payer: COMMERCIAL

## 2022-02-08 VITALS
HEART RATE: 85 BPM | DIASTOLIC BLOOD PRESSURE: 80 MMHG | HEIGHT: 66 IN | BODY MASS INDEX: 21.69 KG/M2 | OXYGEN SATURATION: 99 % | RESPIRATION RATE: 16 BRPM | TEMPERATURE: 97.5 F | SYSTOLIC BLOOD PRESSURE: 120 MMHG | WEIGHT: 135 LBS

## 2022-02-08 DIAGNOSIS — Z13.1 SCREENING FOR DIABETES MELLITUS: ICD-10-CM

## 2022-02-08 DIAGNOSIS — Z30.42 ENCOUNTER FOR SURVEILLANCE OF INJECTABLE CONTRACEPTIVE: ICD-10-CM

## 2022-02-08 DIAGNOSIS — Z00.00 ENCOUNTER FOR ROUTINE ADULT HEALTH EXAMINATION WITHOUT ABNORMAL FINDINGS: Primary | ICD-10-CM

## 2022-02-08 DIAGNOSIS — Z13.220 SCREENING FOR LIPOID DISORDERS: ICD-10-CM

## 2022-02-08 DIAGNOSIS — Z11.59 NEED FOR HEPATITIS C SCREENING TEST: ICD-10-CM

## 2022-02-08 PROBLEM — N97.9 FEMALE INFERTILITY: Status: RESOLVED | Noted: 2017-02-20 | Resolved: 2022-02-08

## 2022-02-08 PROBLEM — O35.EXX0 PYELECTASIS OF FETUS ON PRENATAL ULTRASOUND: Status: RESOLVED | Noted: 2019-05-16 | Resolved: 2022-02-08

## 2022-02-08 PROBLEM — N46.9 MALE INFERTILITY: Status: RESOLVED | Noted: 2017-02-20 | Resolved: 2022-02-08

## 2022-02-08 PROBLEM — Z34.80 SUPERVISION OF OTHER NORMAL PREGNANCY, ANTEPARTUM: Status: RESOLVED | Noted: 2017-11-17 | Resolved: 2022-02-08

## 2022-02-08 PROBLEM — N92.6 IRREGULAR MENSES: Status: RESOLVED | Noted: 2017-02-20 | Resolved: 2022-02-08

## 2022-02-08 PROBLEM — O36.5931 IUGR (INTRAUTERINE GROWTH RESTRICTION) AFFECTING CARE OF MOTHER, THIRD TRIMESTER, FETUS 1: Status: RESOLVED | Noted: 2018-05-18 | Resolved: 2022-02-08

## 2022-02-08 LAB
CHOLEST SERPL-MCNC: 195 MG/DL
FASTING STATUS PATIENT QL REPORTED: YES
FASTING STATUS PATIENT QL REPORTED: YES
GLUCOSE BLD-MCNC: 90 MG/DL (ref 70–99)
HDLC SERPL-MCNC: 40 MG/DL
LDLC SERPL CALC-MCNC: 138 MG/DL
NONHDLC SERPL-MCNC: 155 MG/DL
TRIGL SERPL-MCNC: 84 MG/DL

## 2022-02-08 PROCEDURE — 96372 THER/PROPH/DIAG INJ SC/IM: CPT | Performed by: FAMILY MEDICINE

## 2022-02-08 PROCEDURE — 86803 HEPATITIS C AB TEST: CPT | Performed by: FAMILY MEDICINE

## 2022-02-08 PROCEDURE — 99395 PREV VISIT EST AGE 18-39: CPT | Mod: 25 | Performed by: FAMILY MEDICINE

## 2022-02-08 PROCEDURE — 80061 LIPID PANEL: CPT | Performed by: FAMILY MEDICINE

## 2022-02-08 PROCEDURE — 36415 COLL VENOUS BLD VENIPUNCTURE: CPT | Performed by: FAMILY MEDICINE

## 2022-02-08 PROCEDURE — 82947 ASSAY GLUCOSE BLOOD QUANT: CPT | Performed by: FAMILY MEDICINE

## 2022-02-08 RX ORDER — MEDROXYPROGESTERONE ACETATE 150 MG/ML
150 INJECTION, SUSPENSION INTRAMUSCULAR
Status: ACTIVE | OUTPATIENT
Start: 2022-02-08 | End: 2023-02-03

## 2022-02-08 RX ADMIN — MEDROXYPROGESTERONE ACETATE 150 MG: 150 INJECTION, SUSPENSION INTRAMUSCULAR at 11:16

## 2022-02-08 ASSESSMENT — ENCOUNTER SYMPTOMS
COUGH: 0
SORE THROAT: 0
WEAKNESS: 0
ARTHRALGIAS: 0
HEMATOCHEZIA: 0
FEVER: 0
CHILLS: 0
ABDOMINAL PAIN: 0
DYSURIA: 0
NAUSEA: 0
HEARTBURN: 0
CONSTIPATION: 0
NERVOUS/ANXIOUS: 0
HEMATURIA: 0
JOINT SWELLING: 0
DIARRHEA: 0
BREAST MASS: 0
EYE PAIN: 0
DIZZINESS: 0
FREQUENCY: 0
SHORTNESS OF BREATH: 0
MYALGIAS: 0
PALPITATIONS: 0
HEADACHES: 0
PARESTHESIAS: 0

## 2022-02-08 ASSESSMENT — MIFFLIN-ST. JEOR: SCORE: 1331.98

## 2022-02-08 ASSESSMENT — PAIN SCALES - GENERAL: PAINLEVEL: NO PAIN (0)

## 2022-02-08 NOTE — PROGRESS NOTES
SUBJECTIVE:   CC: Robert Anand is an 32 year old woman who presents for preventive health visit.       Patient has been advised of split billing requirements and indicates understanding: Yes  Healthy Habits:     Getting at least 3 servings of Calcium per day:  Yes    Bi-annual eye exam:  NO    Dental care twice a year:  NO    Sleep apnea or symptoms of sleep apnea:  None    Diet:  Other    Frequency of exercise:  None    Taking medications regularly:  Yes    Medication side effects:  None    PHQ-2 Total Score: 0    Additional concerns today:  No  Contraception  Pertinent negatives include no abdominal pain, arthralgias, chest pain, chills, congestion, coughing, fever, headaches, joint swelling, myalgias, nausea, rash, sore throat or weakness.       Today's PHQ-2 Score:   PHQ-2 ( 1999 [employer]) 2/8/2022   Q1: Little interest or pleasure in doing things 0   Q2: Feeling down, depressed or hopeless 0   PHQ-2 Score 0   PHQ-2 Total Score (12-17 Years)- Positive if 3 or more points; Administer PHQ-A if positive -   Q1: Little interest or pleasure in doing things Not at all   Q2: Feeling down, depressed or hopeless Not at all   PHQ-2 Score 0       Abuse: Current or Past (Physical, Sexual or Emotional) - No  Do you feel safe in your environment? Yes    Have you ever done Advance Care Planning? (For example, a Health Directive, POLST, or a discussion with a medical provider or your loved ones about your wishes): No, advance care planning information given to patient to review.  Patient plans to discuss their wishes with loved ones or provider.      Social History     Tobacco Use     Smoking status: Never Smoker     Smokeless tobacco: Never Used   Substance Use Topics     Alcohol use: No     Alcohol/week: 0.0 standard drinks         Alcohol Use 2/8/2022   Prescreen: >3 drinks/day or >7 drinks/week? Not Applicable       Reviewed orders with patient.  Reviewed health maintenance and updated orders accordingly - Yes  Labs  reviewed in EPIC  BP Readings from Last 3 Encounters:   22 120/80   08/10/21 132/80   21 120/70    Wt Readings from Last 3 Encounters:   22 61.2 kg (135 lb)   08/10/21 63.1 kg (139 lb 3.2 oz)   21 59.6 kg (131 lb 6.4 oz)                  Patient Active Problem List   Diagnosis     Encounter for surveillance of injectable contraceptive     Past Surgical History:   Procedure Laterality Date     laproscopy      infertility       Social History     Tobacco Use     Smoking status: Never Smoker     Smokeless tobacco: Never Used   Substance Use Topics     Alcohol use: No     Alcohol/week: 0.0 standard drinks     History reviewed. No pertinent family history.      No current outpatient medications on file.     No Known Allergies  Recent Labs   Lab Test 17  0941   ALT 23   CR 0.74   GFRESTIMATED >90  Non  GFR Calc     GFRESTBLACK >90   GFR Calc     POTASSIUM 4.1   TSH 4.14*        Breast Cancer Screening:    Breast CA Risk Assessment (FHS-7) 2022   Do you have a family history of breast, colon, or ovarian cancer? No / Unknown       click delete button to remove this line now  Patient under 40 years of age: Routine Mammogram Screening not recommended.   Pertinent mammograms are reviewed under the imaging tab.    History of abnormal Pap smear: NO - age 30- 65 PAP every 3 years recommended  PAP / HPV Latest Ref Rng & Units 3/9/2020 2017   PAP (Historical) - NIL NIL   HPV16 NEG:Negative Negative -   HPV18 NEG:Negative Negative -   HRHPV NEG:Negative Negative -     Reviewed and updated as needed this visit by clinical staff  Tobacco  Allergies  Meds  Problems    Fam Hx  Soc Hx       Reviewed and updated as needed this visit by Provider    Meds  Problems             No past medical history on file.   Past Surgical History:   Procedure Laterality Date     laproscopy      infertility     OB History    Para Term  AB Living   3 3 3 0 0  "3   SAB IAB Ectopic Multiple Live Births   0 0 0 0 3      # Outcome Date GA Lbr Nabeel/2nd Weight Sex Delivery Anes PTL Lv   3 Term 19 38w3d  3.147 kg (6 lb 15 oz) M  None N HENRY      Complications: Precipitous delivery   2 Term 18 38w4d  2.863 kg (6 lb 5 oz) F   N HENRY      Apgar1: 8  Apgar5: 9   1 Term 14   3.8 kg (8 lb 6 oz) M Vag-Spont   HENRY       Review of Systems   Constitutional: Negative for chills and fever.   HENT: Negative for congestion, ear pain, hearing loss and sore throat.    Eyes: Negative for pain and visual disturbance.   Respiratory: Negative for cough and shortness of breath.    Cardiovascular: Negative for chest pain, palpitations and peripheral edema.   Gastrointestinal: Negative for abdominal pain, constipation, diarrhea, heartburn, hematochezia and nausea.   Breasts:  Negative for tenderness, breast mass and discharge.   Genitourinary: Negative for dysuria, frequency, genital sores, hematuria, pelvic pain, urgency, vaginal bleeding and vaginal discharge.   Musculoskeletal: Negative for arthralgias, joint swelling and myalgias.   Skin: Negative for rash.   Neurological: Negative for dizziness, weakness, headaches and paresthesias.   Psychiatric/Behavioral: Negative for mood changes. The patient is not nervous/anxious.         OBJECTIVE:   /80   Pulse 85   Temp 97.5  F (36.4  C) (Temporal)   Resp 16   Ht 1.665 m (5' 5.55\")   Wt 61.2 kg (135 lb)   LMP  (LMP Unknown)   SpO2 99%   BMI 22.09 kg/m    Physical Exam  GENERAL: healthy, alert and no distress  EYES: Eyes grossly normal to inspection, PERRL and conjunctivae and sclerae normal  HENT: ear canals and TM's normal, nose and mouth without ulcers or lesions  NECK: no adenopathy, no asymmetry, masses, or scars and thyroid normal to palpation  RESP: lungs clear to auscultation - no rales, rhonchi or wheezes  BREAST: normal without masses, tenderness or nipple discharge and no palpable axillary masses or " "adenopathy  CV: regular rate and rhythm, normal S1 S2, no S3 or S4, no murmur, click or rub, no peripheral edema and peripheral pulses strong  ABDOMEN: soft, nontender, no hepatosplenomegaly, no masses and bowel sounds normal  MS: no gross musculoskeletal defects noted, no edema  SKIN: no suspicious lesions or rashes  NEURO: Normal strength and tone, mentation intact and speech normal  PSYCH: mentation appears normal, affect normal/bright    Diagnostic Test Results:  Labs reviewed in Epic    ASSESSMENT/PLAN:       ICD-10-CM    1. Encounter for routine adult health examination without abnormal findings  Z00.00    2. Encounter for surveillance of injectable contraceptive  Z30.42 medroxyPROGESTERone (DEPO-PROVERA) injection 150 mg   3. Screening for diabetes mellitus  Z13.1 Glucose   4. Screening for lipoid disorders  Z13.220 Lipid panel reflex to direct LDL Fasting   5. Need for hepatitis C screening test  Z11.59 Hepatitis C Screen Reflex to HCV RNA Quant and Genotype       Patient has been advised of split billing requirements and indicates understanding: Yes    COUNSELING:  Reviewed preventive health counseling, as reflected in patient instructions       Regular exercise       Healthy diet/nutrition    Estimated body mass index is 22.09 kg/m  as calculated from the following:    Height as of this encounter: 1.665 m (5' 5.55\").    Weight as of this encounter: 61.2 kg (135 lb).        She reports that she has never smoked. She has never used smokeless tobacco.      Counseling Resources:  ATP IV Guidelines  Pooled Cohorts Equation Calculator  Breast Cancer Risk Calculator  BRCA-Related Cancer Risk Assessment: FHS-7 Tool  FRAX Risk Assessment  ICSI Preventive Guidelines  Dietary Guidelines for Americans, 2010  USDA's MyPlate  ASA Prophylaxis  Lung CA Screening    Dayami Ramirez MD  [facility]  "

## 2022-02-09 LAB — HCV AB SERPL QL IA: NONREACTIVE

## 2022-02-10 NOTE — RESULT ENCOUNTER NOTE
Normal blood glucose levels.  Marginally elevated cholesterol levels.  Encourage low-fat diet, regular exercise to improve this.  Needs recheck in 5 years for follow-up.  Negative hepatitis C test.

## 2022-04-29 ENCOUNTER — ALLIED HEALTH/NURSE VISIT (OUTPATIENT)
Dept: FAMILY MEDICINE | Facility: OTHER | Age: 32
End: 2022-04-29
Payer: COMMERCIAL

## 2022-04-29 DIAGNOSIS — Z30.42 ENCOUNTER FOR SURVEILLANCE OF INJECTABLE CONTRACEPTIVE: Primary | ICD-10-CM

## 2022-04-29 PROCEDURE — 99207 PR NO CHARGE NURSE ONLY: CPT

## 2022-04-29 NOTE — PROGRESS NOTES
Clinic Administered Medication Documentation          Depo Provera Documentation    URINE HCG: not indicated    Depo-Provera Standing Order inclusion/exclusion criteria reviewed.   Patient meets: inclusion criteria     BP: Data Unavailable  LAST PAP/EXAM:   Lab Results   Component Value Date    PAP NIL 03/09/2020       Prior to injection, verified patient identity using patient's name and date of birth. Medication was administered. Please see MAR and medication order for additional information.     Was entire vial of medication used? Yes  Vial/Syringe: Single dose vial  Expiration Date:  09/30/23    Patient instructed to remain in clinic for 15 minutes.  NEXT INJECTION DUE: 7/15/22 - 7/29/22

## 2022-07-01 ENCOUNTER — OFFICE VISIT (OUTPATIENT)
Dept: FAMILY MEDICINE | Facility: OTHER | Age: 32
End: 2022-07-01
Payer: COMMERCIAL

## 2022-07-01 VITALS
TEMPERATURE: 98.4 F | WEIGHT: 134.5 LBS | OXYGEN SATURATION: 100 % | BODY MASS INDEX: 22.96 KG/M2 | HEART RATE: 82 BPM | DIASTOLIC BLOOD PRESSURE: 78 MMHG | SYSTOLIC BLOOD PRESSURE: 132 MMHG | HEIGHT: 64 IN

## 2022-07-01 DIAGNOSIS — M54.50 ACUTE MIDLINE LOW BACK PAIN WITHOUT SCIATICA: ICD-10-CM

## 2022-07-01 DIAGNOSIS — M75.21 BICEPS TENDONITIS, RIGHT: Primary | ICD-10-CM

## 2022-07-01 PROCEDURE — 99213 OFFICE O/P EST LOW 20 MIN: CPT | Performed by: FAMILY MEDICINE

## 2022-07-01 RX ORDER — CYCLOBENZAPRINE HCL 5 MG
5 TABLET ORAL
Qty: 30 TABLET | Refills: 0 | Status: SHIPPED | OUTPATIENT
Start: 2022-07-01 | End: 2022-09-09

## 2022-07-01 RX ORDER — NAPROXEN 500 MG/1
500 TABLET ORAL 2 TIMES DAILY WITH MEALS
Qty: 60 TABLET | Refills: 0 | Status: SHIPPED | OUTPATIENT
Start: 2022-07-01 | End: 2022-08-04

## 2022-07-01 ASSESSMENT — PAIN SCALES - GENERAL: PAINLEVEL: MODERATE PAIN (5)

## 2022-07-01 NOTE — LETTER
[facility]  [address]  [place] [address]  Phone: [phone]    July 1, 2022        Robert Anand  [address] [address]          To whom it may concern:    RE: Robert Anand    Patient was seen and treated today at our clinic.  Patient may return to work  with the following restrictions applicable to the right upper extremity for the next 6 weeks  Reach Above Shoulders: Occasionally (1-3 hours)  Lift, carry, push, and pull no more than: 0-10 lbs.     Please contact me for questions or concerns.      Sincerely,        Dayami Ramirez MD

## 2022-07-01 NOTE — PROGRESS NOTES
Assessment & Plan     Biceps tendonitis, right  Exam consistent with biceps tendonitis  Advised a trial of therapy  Work restrictions given  - Physical Therapy Referral; Future    Acute midline low back pain without sciatica  No signs of radiculopathy  Discussed home care  Reportable signs and symptoms discussed  RTC if symptoms persist or fail to improve    - Physical Therapy Referral; Future  - naproxen (NAPROSYN) 500 MG tablet; Take 1 tablet (500 mg) by mouth 2 times daily (with meals)  - cyclobenzaprine (FLEXERIL) 5 MG tablet; Take 1 tablet (5 mg) by mouth nightly as needed for muscle spasms      Return in about 6 weeks (around 8/12/2022).    Dayami Ramirez MD  [facility] RAKESH Jones is a 32 year old, presenting for the following health issues:  Shoulder Pain (Onset May 31, 2022, also lower back pain, happened at work moving/lifting packages for [employer], started on right but now is on left and right feels better)      Shoulder Pain    History of Present Illness       Back Pain:  She presents for follow up of back pain. Patient's back pain is a new problem.    Original cause of back pain: lifting  First noticed back pain: in the last week  Patient feels back pain: comes and goesLocation of back pain:  Right shoulder  Description of back pain: cramping  Back pain spreads: right shoulder    Since patient first noticed back pain, pain is: rapidly improving  Does back pain interfere with her job:  Yes  On a scale of 1-10 (10 being the worst), patient describes pain as:  5  What makes back pain worse: standing  Acupuncture: helpful  Acetaminophen: not tried  Activity or exercise: not tried  Chiropractor:  Not tried  Cold: not tried  Heat: not tried  Massage: not tried  Muscle relaxants: not tried  NSAIDS: not helpful  Opioids: not tried  Physical Therapy: not tried  Rest: not tried  Steroid Injection: not tried  Stretching: not tried  Surgery: not tried  TENS unit: not  "tried  Topical pain relievers: not tried  Other healthcare providers patient is seeing for back pain: None    She eats 0-1 servings of fruits and vegetables daily.She consumes 1 sweetened beverage(s) daily.She exercises with enough effort to increase her heart rate 9 or less minutes per day.  She exercises with enough effort to increase her heart rate 3 or less days per week. She is missing 1 dose(s) of medications per week.   pain in the right shoulder. Pain since May 31st  as she works at [employer]. (worker comp exam)  Has been using left shoulder to lift packages instead. Could not come earlier as no available appointment. I was lifting some packages and putting them back. Having repetitive motion made the pain worse and pain got severely worse  Until On May 31st when the pain got significantly worse.    Review of Systems   Constitutional, HEENT, cardiovascular, pulmonary, gi and gu systems are negative, except as otherwise noted.      Objective    /78 (BP Location: Right arm, Patient Position: Sitting, Cuff Size: Adult Regular)   Pulse 82   Temp 98.4  F (36.9  C) (Oral)   Ht 1.634 m (5' 4.33\")   Wt 61 kg (134 lb 8 oz)   SpO2 100%   BMI 22.85 kg/m    Body mass index is 22.85 kg/m .  Physical Exam   GENERAL: healthy, alert and no distress  NECK: no adenopathy, no asymmetry, masses, or scars and thyroid normal to palpation  RESP: lungs clear to auscultation - no rales, rhonchi or wheezes  CV: regular rate and rhythm, normal S1 S2, no S3 or S4, no murmur, click or rub, no peripheral edema and peripheral pulses strong  ABDOMEN: soft, nontender, no hepatosplenomegaly, no masses and bowel sounds normal  MS: no gross musculoskeletal defects noted, no edema, TTP along the long head of biceps. Normal ROM along the right shoulder.      "

## 2022-07-18 ENCOUNTER — THERAPY VISIT (OUTPATIENT)
Dept: PHYSICAL THERAPY | Facility: CLINIC | Age: 32
End: 2022-07-18
Attending: FAMILY MEDICINE
Payer: OTHER MISCELLANEOUS

## 2022-07-18 DIAGNOSIS — M54.50 ACUTE MIDLINE LOW BACK PAIN WITHOUT SCIATICA: ICD-10-CM

## 2022-07-18 DIAGNOSIS — M75.21 BICEPS TENDONITIS, RIGHT: ICD-10-CM

## 2022-07-18 PROCEDURE — 97162 PT EVAL MOD COMPLEX 30 MIN: CPT | Mod: GP | Performed by: PHYSICAL THERAPIST

## 2022-07-18 PROCEDURE — 97110 THERAPEUTIC EXERCISES: CPT | Mod: GP | Performed by: PHYSICAL THERAPIST

## 2022-07-18 PROCEDURE — 97112 NEUROMUSCULAR REEDUCATION: CPT | Mod: GP | Performed by: PHYSICAL THERAPIST

## 2022-07-18 NOTE — PROGRESS NOTES
Physical Therapy Initial Evaluation  Subjective:  The history is provided by the patient and medical records. The history is limited by a language barrier. A  was used (Hungarian  via telephone).   Patient Health History  Robert Anand being seen for Low Back Pain.     Problem began: 5/31/2022.   Problem occurred: Lifting packages at work, started having R shoulder pain and Low back pain started shortly after this due to how she was lifting   Pain is reported as 8/10 (8/10 w/o pain medication; 2-3/10 w/ medication) on pain scale.  General health as reported by patient is excellent.  Pertinent medical history includes: none.   Red flags:  None as reported by patient.  Medical allergies: none.   Surgeries include:  None.    Current medications:  Anti-inflammatory and muscle relaxants.    Current occupation is  w/ [employer];  and 3 kids (7 yo son, 3 yo daughter, 3 yo son).   Primary job tasks include:  Lifting/carrying and driving.                  Therapist Generated HPI Evaluation  Problem details: Was lifting packages at work and started having R Shoulder pain. This developed into low back pain around 5/31/22..         Type of problem:  Lumbar.    This is a new condition.  Condition occurred with:  Lifting and repetition/overuse.  Where condition occurred: at work.  Patient reports pain:  Mid lumbar spine.  Pain is described as aching and is intermittent.  Pain radiates to:  No radiation. Pain is worse in the P.M. (Activity dependent).  Since onset symptoms are gradually improving.  Associated symptoms:  Loss of motion/stiffness. Symptoms are exacerbated by bending, lifting, standing, sitting, walking and carrying  and relieved by rest, NSAID's, muscle relaxants, heat and bracing/immobilizing.    Past treatment: none yet.   Restrictions due to condition include:  Working in normal job with restrictions (10# lifting restriction).  Barriers include:  Other (difficulty  lifting ypung children).                        Objective:  Standing Alignment:        Lumbar:  Sway back                           Lumbar/SI Evaluation  ROM:    AROM Lumbar:   Flexion:            To toes  Ext:                    75%   Side Bend:        Left:  WNL    Right:  WNL  Rotation:           Left:  WNL    Right:  WNL   Side Glide:        Left:     Right:         Strength: Lower Abdominal MMT 3/5; Abdominal tenting when lifting head and shoulders during diastasis recti check (see below); able to correct tenting w/ cuing to activate TA; Difficulty activating TA w/o also lifting head and shoulders  Lumbar Myotomes:  Lumbar myotomes: all levels WNL/EQ B when tested in sitting.                  Neural Tension/Mobility:      Left side:SLR or Slump  negative.     Right side:   Slump or SLR  negative.   Lumbar Palpation:  Palpation (lumbar): (+) Diastasis Recti: 1 finger width above and below umbilicus.    Tenderness not present at Left:    Erector Spinae    Tenderness not present at Right:  Erector Spinae    Lumbar Provocation:  Lumbar provocation: (+) Prone instability test.      Spinal Segmental Conclusions:     Level: Hyper noted at L1      SI joint/Sacrum:    (-) Active SLR B  (-) JEMMA B  No pelvic malalignment noted                                                       General     ROS    Assessment/Plan:    Patient is a 32 year old female with lumbar complaints.    Patient has the following significant findings with corresponding treatment plan.                Diagnosis 1:  Low back pain following lifting injury w/ abdominal weakness  Pain -  hot/cold therapy, electric stimulation, manual therapy, splint/taping/bracing/orthotics, self management, education and home program  Decreased ROM/flexibility - manual therapy, therapeutic exercise, therapeutic activity and home program  Decreased strength - therapeutic exercise, therapeutic activities and home program  Impaired muscle performance - neuro re-education  and home program  Decreased function - therapeutic activities and home program  Impaired posture - neuro re-education, therapeutic activities and home program  Instability -  Therapeutic Activity  Therapeutic Exercise  Neuromuscular Re-education  Splinting/Taping/Bracing/Orthotic  home program    Therapy Evaluation Codes:   1) History comprised of:   Personal factors that impact the plan of care:      Gender, Language, Past/current experiences, Profession and Time since onset of symptoms.    Comorbidity factors that impact the plan of care are:      None.     Medications impacting care: Anti-inflammatory and Muscle relaxant.  2) Examination of Body Systems comprised of:   Body structures and functions that impact the plan of care:      Lumbar spine.   Activity limitations that impact the plan of care are:      Bending, Lifting, Sitting, Standing, Walking and Working.  3) Clinical presentation characteristics are:   Evolving/Changing.  4) Decision-Making    Moderate complexity using standardized patient assessment instrument and/or measureable assessment of functional outcome.  Cumulative Therapy Evaluation is: Moderate complexity.    Previous and current functional limitations:  (See Goal Flow Sheet for this information)    Short term and Long term goals: (See Goal Flow Sheet for this information)     Communication ability:  Patient has an  for communication clarity.  Treatment Explanation - The following has been discussed with the patient:   RX ordered/plan of care  Anticipated outcomes  Possible risks and side effects  This patient would benefit from PT intervention to resume normal activities.   Rehab potential is good.    Frequency:  1 X week, once daily  Duration:  for 8 weeks  Discharge Plan:  Achieve all LTG.  Independent in home treatment program.  Reach maximal therapeutic benefit.    Please refer to the daily flowsheet for treatment today, total treatment time and time spent performing 1:1  timed codes.

## 2022-07-19 PROBLEM — M54.50 ACUTE MIDLINE LOW BACK PAIN WITHOUT SCIATICA: Status: ACTIVE | Noted: 2022-07-19

## 2022-07-25 ENCOUNTER — THERAPY VISIT (OUTPATIENT)
Dept: PHYSICAL THERAPY | Facility: CLINIC | Age: 32
End: 2022-07-25
Attending: FAMILY MEDICINE
Payer: OTHER MISCELLANEOUS

## 2022-07-25 DIAGNOSIS — M25.511 CHRONIC RIGHT SHOULDER PAIN: ICD-10-CM

## 2022-07-25 DIAGNOSIS — G89.29 CHRONIC RIGHT SHOULDER PAIN: ICD-10-CM

## 2022-07-25 DIAGNOSIS — M54.50 ACUTE MIDLINE LOW BACK PAIN WITHOUT SCIATICA: ICD-10-CM

## 2022-07-25 PROCEDURE — 97161 PT EVAL LOW COMPLEX 20 MIN: CPT | Mod: GP | Performed by: PHYSICAL THERAPIST

## 2022-07-25 PROCEDURE — 97140 MANUAL THERAPY 1/> REGIONS: CPT | Mod: GP | Performed by: PHYSICAL THERAPIST

## 2022-07-25 PROCEDURE — 97110 THERAPEUTIC EXERCISES: CPT | Mod: GP | Performed by: PHYSICAL THERAPIST

## 2022-07-25 NOTE — PROGRESS NOTES
Physical Therapy Initial Evaluation  Subjective:  The history is provided by the patient. The history is limited by a language barrier. A  was used (Vietnamese  via telephone).   Patient Health History  Robert Anand being seen for R Shoulder pain.     Problem began: 5/31/2022.   Problem occurred:  Lifting packages at work, started having R shoulder pain    Pain is reported as 5/10 on pain scale.                                         Therapist Generated HPI Evaluation         Type of problem:  Right shoulder.    This is a chronic condition.  Condition occurred with:  Lifting and repetition/overuse.  Where condition occurred: at work.  Patient reports pain:  Anterior.  Pain is described as aching and is intermittent.  Radiates to: none. Pain is the same all the time.  Since onset symptoms are gradually improving (since starting medication about 3 weeks ago).  Associated with: denies stiffness, weakness, numbness, or tingling. Symptoms are exacerbated by carrying, lifting and using arm overhead  and relieved by muscle relaxants, NSAID's and rest.  Imaging testing: None.  Past treatment: none yet.   Restrictions due to condition include:  Working in normal job with restrictions (10# lifting restrictions).  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      General health as reported by patient is excellent.  Pertinent medical history includes: none.   Red flags:  None as reported by patient.  Medical allergies: none.   Surgeries include:  None.    Current medications:  Anti-inflammatory and muscle relaxants.    Current occupation is  w/ [employer];  and 3 kids (7 yo son, 5 yo daughter, 3 yo son).   Primary job tasks include:  Lifting/carrying and driving.                     Objective:  Standing Alignment:      Shoulder/UE:  Rounded shoulders                                       Shoulder Evaluation:  ROM:  AROM:  : B  Shoulders WNL and pain free flex, abd, ER, ExtIR, FlexER.    Extension: Left: 70Right: 70                    Extension/Internal Rotation:  Left:  T2    Right:  T2          Strength:    Flexion: Left:5/5   Pain:    Right: 3+/5      Pain:  +    Abduction:  Left: 5/5  Pain:    Right: 3+/5      Pain:+    Internal Rotation:  Left:5/5     Pain:    Right: 5/5     Pain:  External Rotation:   Left:5/5     Pain:   Right:4/5      Pain:+              Special Tests:  Special tests assessed shoulder: (+) Speed's test R, (-) L.      Palpation:      Right shoulder tenderness present at: Biceps and Bicipital Groove                                     General     ROS    Assessment/Plan:    Patient is a 32 year old female with right side shoulder complaints.    Patient has the following significant findings with corresponding treatment plan.                Diagnosis 1:  R Shoulder pain consistent w/ biceps tendonitis  Pain -  hot/cold therapy, US, electric stimulation, manual therapy, splint/taping/bracing/orthotics, self management, education and home program  Decreased strength - therapeutic exercise, therapeutic activities and home program  Impaired muscle performance - neuro re-education and home program  Decreased function - therapeutic activities and home program  Impaired posture - neuro re-education, therapeutic activities and home program    Therapy Evaluation Codes:   1) History comprised of:   Personal factors that impact the plan of care:      Language, Profession and Time since onset of symptoms.    Comorbidity factors that impact the plan of care are:      None.     Medications impacting care: Anti-inflammatory and Muscle relaxant.  2) Examination of Body Systems comprised of:   Body structures and functions that impact the plan of care:      Shoulder.   Activity limitations that impact the plan of care are:      Cooking, Lifting and Working.  3) Clinical presentation characteristics  are:   Stable/Uncomplicated.  4) Decision-Making    Low complexity using standardized patient assessment instrument and/or measureable assessment of functional outcome.  Cumulative Therapy Evaluation is: Low complexity.    Previous and current functional limitations:  (See Goal Flow Sheet for this information)    Short term and Long term goals: (See Goal Flow Sheet for this information)     Communication ability:  Patient appears to be able to clearly communicate and understand verbal and written communication and follow directions correctly.  Patient has an  for communication clarity.  Treatment Explanation - The following has been discussed with the patient:   RX ordered/plan of care  Anticipated outcomes  Possible risks and side effects  This patient would benefit from PT intervention to resume normal activities.   Rehab potential is excellent.    Frequency:  1 X week, once daily  Duration:  for 8 weeks  Discharge Plan:  Achieve all LTG.  Independent in home treatment program.  Reach maximal therapeutic benefit.    Please refer to the daily flowsheet for treatment today, total treatment time and time spent performing 1:1 timed codes.

## 2022-07-25 NOTE — PROGRESS NOTES
Physical Therapy Initial Evaluation  Subjective:  HPI                    Objective:  System    Physical Exam    General     ROS    Assessment/Plan:    {REHAB NOTES:[id number]}

## 2022-08-01 ENCOUNTER — THERAPY VISIT (OUTPATIENT)
Dept: PHYSICAL THERAPY | Facility: CLINIC | Age: 32
End: 2022-08-01
Payer: OTHER MISCELLANEOUS

## 2022-08-01 DIAGNOSIS — M54.50 ACUTE MIDLINE LOW BACK PAIN WITHOUT SCIATICA: Primary | ICD-10-CM

## 2022-08-01 DIAGNOSIS — M54.50 ACUTE MIDLINE LOW BACK PAIN WITHOUT SCIATICA: ICD-10-CM

## 2022-08-01 PROCEDURE — 97140 MANUAL THERAPY 1/> REGIONS: CPT | Mod: GP | Performed by: PHYSICAL THERAPIST

## 2022-08-01 PROCEDURE — 97110 THERAPEUTIC EXERCISES: CPT | Mod: GP | Performed by: PHYSICAL THERAPIST

## 2022-08-03 NOTE — TELEPHONE ENCOUNTER
"RN is unable to fill.     NSAID Medications Failed    Rerun Protocol (8/1/2022 3:13 AM)      Normal ALT on file in past 12 months        Recent Labs   Lab Test 01/26/17  0941   ALT 23         Normal AST on file in past 12 months        Recent Labs   Lab Test 01/26/17  0941   AST 24         Normal CBC on file in past 12 months         Recent Labs   Lab Test 04/18/19  1625 01/07/19  1548   WBC  --  3.5*   RBC  --  4.08   HGB 12.2 12.5   HCT  --  36.8   PLT  --  238             Normal serum creatinine on file in past 12 months        Recent Labs   Lab Test 01/26/17  0941   CR 0.74      Ok to refill medication if creatinine is low      Blood pressure under 140/90 in past 12 months        BP Readings from Last 3 Encounters:   07/01/22 132/78   02/08/22 120/80   08/10/21 132/80             Recent (12 mo) or future (30 days) visit within the authorizing provider's specialty    Patient has had an office visit with the authorizing provider or a provider within the authorizing providers department within the previous 12 mos or has a future within next 30 days. See \"Patient Info\" tab in inbasket, or \"Choose Columns\" in Meds & Orders section of the refill encounter.           Patient is age 6-64 years          Medication is active on med list          No active pregnancy on record          No positive pregnancy test in past 12 months      "

## 2022-08-03 NOTE — TELEPHONE ENCOUNTER
Pending Prescriptions:                       Disp   Refills    naproxen (NAPROSYN) 500 MG tablet [Pharmac*60 tab*0        Sig: TAKE 1 TABLET(500 MG) BY MOUTH TWICE DAILY WITH MEALS        Routing refill request to provider for review/approval because:  Drug not on the [facility] refill protocol

## 2022-08-04 RX ORDER — NAPROXEN 500 MG/1
TABLET ORAL
Qty: 60 TABLET | Refills: 0 | Status: SHIPPED | OUTPATIENT
Start: 2022-08-04 | End: 2022-09-09

## 2022-08-10 ENCOUNTER — THERAPY VISIT (OUTPATIENT)
Dept: PHYSICAL THERAPY | Facility: CLINIC | Age: 32
End: 2022-08-10
Payer: OTHER MISCELLANEOUS

## 2022-08-10 DIAGNOSIS — M54.50 ACUTE MIDLINE LOW BACK PAIN WITHOUT SCIATICA: Primary | ICD-10-CM

## 2022-08-10 PROCEDURE — 97140 MANUAL THERAPY 1/> REGIONS: CPT | Mod: GP | Performed by: PHYSICAL THERAPIST

## 2022-08-10 PROCEDURE — 97112 NEUROMUSCULAR REEDUCATION: CPT | Mod: GP | Performed by: PHYSICAL THERAPIST

## 2022-08-10 PROCEDURE — 97110 THERAPEUTIC EXERCISES: CPT | Mod: GP | Performed by: PHYSICAL THERAPIST

## 2022-08-16 ENCOUNTER — THERAPY VISIT (OUTPATIENT)
Dept: PHYSICAL THERAPY | Facility: CLINIC | Age: 32
End: 2022-08-16
Payer: OTHER MISCELLANEOUS

## 2022-08-16 DIAGNOSIS — M25.511 CHRONIC RIGHT SHOULDER PAIN: Primary | ICD-10-CM

## 2022-08-16 DIAGNOSIS — M54.50 ACUTE MIDLINE LOW BACK PAIN WITHOUT SCIATICA: ICD-10-CM

## 2022-08-16 DIAGNOSIS — G89.29 CHRONIC RIGHT SHOULDER PAIN: Primary | ICD-10-CM

## 2022-08-16 PROCEDURE — 97110 THERAPEUTIC EXERCISES: CPT | Mod: GP | Performed by: PHYSICAL THERAPY ASSISTANT

## 2022-08-16 PROCEDURE — 97112 NEUROMUSCULAR REEDUCATION: CPT | Mod: GP | Performed by: PHYSICAL THERAPY ASSISTANT

## 2022-08-16 PROCEDURE — 97140 MANUAL THERAPY 1/> REGIONS: CPT | Mod: GP | Performed by: PHYSICAL THERAPY ASSISTANT

## 2022-08-25 NOTE — TELEPHONE ENCOUNTER
Routing to provider to advise on appointment request.  Sandhya Johnson MA on 2/4/2022 at 3:40 PM     To get better and follow your care plan as instructed.

## 2022-08-26 ENCOUNTER — THERAPY VISIT (OUTPATIENT)
Dept: PHYSICAL THERAPY | Facility: CLINIC | Age: 32
End: 2022-08-26
Payer: OTHER MISCELLANEOUS

## 2022-08-26 DIAGNOSIS — M54.50 ACUTE MIDLINE LOW BACK PAIN WITHOUT SCIATICA: ICD-10-CM

## 2022-08-26 DIAGNOSIS — G89.29 CHRONIC RIGHT SHOULDER PAIN: Primary | ICD-10-CM

## 2022-08-26 DIAGNOSIS — M25.511 CHRONIC RIGHT SHOULDER PAIN: Primary | ICD-10-CM

## 2022-08-26 PROCEDURE — 97112 NEUROMUSCULAR REEDUCATION: CPT | Mod: GP | Performed by: PHYSICAL THERAPY ASSISTANT

## 2022-08-26 PROCEDURE — 97110 THERAPEUTIC EXERCISES: CPT | Mod: GP | Performed by: PHYSICAL THERAPY ASSISTANT

## 2022-09-02 ENCOUNTER — THERAPY VISIT (OUTPATIENT)
Dept: PHYSICAL THERAPY | Facility: CLINIC | Age: 32
End: 2022-09-02
Payer: OTHER MISCELLANEOUS

## 2022-09-02 DIAGNOSIS — G89.29 CHRONIC RIGHT SHOULDER PAIN: Primary | ICD-10-CM

## 2022-09-02 DIAGNOSIS — M25.511 CHRONIC RIGHT SHOULDER PAIN: Primary | ICD-10-CM

## 2022-09-02 DIAGNOSIS — M54.50 ACUTE MIDLINE LOW BACK PAIN WITHOUT SCIATICA: ICD-10-CM

## 2022-09-02 PROCEDURE — 97140 MANUAL THERAPY 1/> REGIONS: CPT | Mod: GP | Performed by: PHYSICAL THERAPY ASSISTANT

## 2022-09-09 ENCOUNTER — OFFICE VISIT (OUTPATIENT)
Dept: FAMILY MEDICINE | Facility: OTHER | Age: 32
End: 2022-09-09
Payer: COMMERCIAL

## 2022-09-09 VITALS
HEART RATE: 97 BPM | TEMPERATURE: 97.5 F | SYSTOLIC BLOOD PRESSURE: 102 MMHG | HEIGHT: 64 IN | WEIGHT: 142 LBS | DIASTOLIC BLOOD PRESSURE: 64 MMHG | BODY MASS INDEX: 24.24 KG/M2 | OXYGEN SATURATION: 99 % | RESPIRATION RATE: 16 BRPM

## 2022-09-09 DIAGNOSIS — Z23 IMMUNIZATION DUE: ICD-10-CM

## 2022-09-09 DIAGNOSIS — M54.50 ACUTE MIDLINE LOW BACK PAIN WITHOUT SCIATICA: Primary | ICD-10-CM

## 2022-09-09 DIAGNOSIS — R74.01 TRANSAMINITIS: ICD-10-CM

## 2022-09-09 DIAGNOSIS — R10.84 ABDOMINAL PAIN, GENERALIZED: ICD-10-CM

## 2022-09-09 LAB
ALBUMIN SERPL-MCNC: 3.6 G/DL (ref 3.4–5)
ALBUMIN UR-MCNC: ABNORMAL MG/DL
ALP SERPL-CCNC: 37 U/L (ref 40–150)
ALT SERPL W P-5'-P-CCNC: 51 U/L (ref 0–50)
ANION GAP SERPL CALCULATED.3IONS-SCNC: 4 MMOL/L (ref 3–14)
APPEARANCE UR: CLEAR
AST SERPL W P-5'-P-CCNC: 35 U/L (ref 0–45)
BACTERIA #/AREA URNS HPF: ABNORMAL /HPF
BILIRUB SERPL-MCNC: 0.8 MG/DL (ref 0.2–1.3)
BILIRUB UR QL STRIP: NEGATIVE
BUN SERPL-MCNC: 9 MG/DL (ref 7–30)
CALCIUM SERPL-MCNC: 8.4 MG/DL (ref 8.5–10.1)
CHLORIDE BLD-SCNC: 110 MMOL/L (ref 94–109)
CO2 SERPL-SCNC: 27 MMOL/L (ref 20–32)
COLOR UR AUTO: YELLOW
CREAT SERPL-MCNC: 0.64 MG/DL (ref 0.52–1.04)
CRP SERPL-MCNC: <2.9 MG/L (ref 0–8)
ERYTHROCYTE [DISTWIDTH] IN BLOOD BY AUTOMATED COUNT: 12.6 % (ref 10–15)
GFR SERPL CREATININE-BSD FRML MDRD: >90 ML/MIN/1.73M2
GLUCOSE BLD-MCNC: 91 MG/DL (ref 70–99)
GLUCOSE UR STRIP-MCNC: NEGATIVE MG/DL
HCT VFR BLD AUTO: 38.8 % (ref 35–47)
HGB BLD-MCNC: 12.9 G/DL (ref 11.7–15.7)
HGB UR QL STRIP: NEGATIVE
KETONES UR STRIP-MCNC: ABNORMAL MG/DL
LEUKOCYTE ESTERASE UR QL STRIP: NEGATIVE
MCH RBC QN AUTO: 30.9 PG (ref 26.5–33)
MCHC RBC AUTO-ENTMCNC: 33.2 G/DL (ref 31.5–36.5)
MCV RBC AUTO: 93 FL (ref 78–100)
MUCOUS THREADS #/AREA URNS LPF: PRESENT /LPF
NITRATE UR QL: NEGATIVE
PH UR STRIP: 6 [PH] (ref 5–7)
PLATELET # BLD AUTO: 203 10E3/UL (ref 150–450)
POTASSIUM BLD-SCNC: 3.7 MMOL/L (ref 3.4–5.3)
PROT SERPL-MCNC: 7.1 G/DL (ref 6.8–8.8)
RBC # BLD AUTO: 4.17 10E6/UL (ref 3.8–5.2)
RBC #/AREA URNS AUTO: ABNORMAL /HPF
SODIUM SERPL-SCNC: 141 MMOL/L (ref 133–144)
SP GR UR STRIP: 1.02 (ref 1–1.03)
SQUAMOUS #/AREA URNS AUTO: ABNORMAL /LPF
UROBILINOGEN UR STRIP-ACNC: 0.2 E.U./DL
WBC # BLD AUTO: 3.1 10E3/UL (ref 4–11)
WBC #/AREA URNS AUTO: ABNORMAL /HPF

## 2022-09-09 PROCEDURE — 81001 URINALYSIS AUTO W/SCOPE: CPT | Performed by: FAMILY MEDICINE

## 2022-09-09 PROCEDURE — 90686 IIV4 VACC NO PRSV 0.5 ML IM: CPT | Performed by: FAMILY MEDICINE

## 2022-09-09 PROCEDURE — 36415 COLL VENOUS BLD VENIPUNCTURE: CPT | Performed by: FAMILY MEDICINE

## 2022-09-09 PROCEDURE — 85027 COMPLETE CBC AUTOMATED: CPT | Performed by: FAMILY MEDICINE

## 2022-09-09 PROCEDURE — 99214 OFFICE O/P EST MOD 30 MIN: CPT | Mod: 25 | Performed by: FAMILY MEDICINE

## 2022-09-09 PROCEDURE — 90471 IMMUNIZATION ADMIN: CPT | Performed by: FAMILY MEDICINE

## 2022-09-09 PROCEDURE — 86140 C-REACTIVE PROTEIN: CPT | Performed by: FAMILY MEDICINE

## 2022-09-09 PROCEDURE — 80053 COMPREHEN METABOLIC PANEL: CPT | Performed by: FAMILY MEDICINE

## 2022-09-09 RX ORDER — CYCLOBENZAPRINE HCL 5 MG
5 TABLET ORAL
Qty: 30 TABLET | Refills: 5 | Status: ON HOLD | OUTPATIENT
Start: 2022-09-09 | End: 2023-12-01

## 2022-09-09 RX ORDER — NAPROXEN 500 MG/1
TABLET ORAL
Qty: 60 TABLET | Refills: 11 | Status: SHIPPED | OUTPATIENT
Start: 2022-09-09 | End: 2022-09-09

## 2022-09-09 RX ORDER — IBUPROFEN 600 MG/1
600 TABLET, FILM COATED ORAL EVERY 6 HOURS PRN
Qty: 60 TABLET | Refills: 0 | Status: SHIPPED | OUTPATIENT
Start: 2022-09-09

## 2022-09-09 ASSESSMENT — ENCOUNTER SYMPTOMS: BACK PAIN: 1

## 2022-09-09 ASSESSMENT — PAIN SCALES - GENERAL: PAINLEVEL: MILD PAIN (3)

## 2022-09-09 NOTE — PROGRESS NOTES
Assessment & Plan     Acute midline low back pain without sciatica  Improved but not completely resolved  Would like refills on flexeril and ibuprofen to be used as needed  Refills sent  - cyclobenzaprine (FLEXERIL) 5 MG tablet; Take 1 tablet (5 mg) by mouth nightly as needed for muscle spasms  - ibuprofen (ADVIL/MOTRIN) 600 MG tablet; Take 1 tablet (600 mg) by mouth every 6 hours as needed for moderate pain    Immunization due    - INFLUENZA VACCINE IM > 6 MONTHS VALENT IIV4 (AFLURIA/FLUZONE)    Abdominal pain, generalized  Pain around the umbilicus. Has a small umbilical hernia . Avoid constipation and bearing down to prevent worsening  Labs as ordered to r/o UTI vs inflammatory pathology    - UA with Microscopic reflex to Culture - lab collect; Future  - Comprehensive metabolic panel (BMP + Alb, Alk Phos, ALT, AST, Total. Bili, TP); Future  - CBC with platelets; Future  - CRP, inflammation; Future      Return in about 3 months (around 12/9/2022).    Dayami Ramirez MD  [facility]   Robert is a 32 year old, presenting for the following health issues:  Back Pain      Back Pain     History of Present Illness       Back Pain:  She presents for follow up of back pain. Patient's back pain is a new problem.    Original cause of back pain: lifting  First noticed back pain: 1-4 weeks ago  Patient feels back pain: comes and goesLocation of back pain:  Right middle of back  Description of back pain: other  Back pain spreads: right shoulder    Since patient first noticed back pain, pain is: gradually improving  Does back pain interfere with her job:  Yes  On a scale of 1-10 (10 being the worst), patient describes pain as:  3  What makes back pain worse: standing  Acupuncture: not tried  Acetaminophen: not tried  Activity or exercise: helpful  Chiropractor:  Not tried  Cold: not tried  Heat: not tried  Massage: helpful  Muscle relaxants: helpful  NSAIDS: helpful  Opioids: not  "tried  Physical Therapy: helpful  Rest: not tried  Steroid Injection: not tried  Stretching: not tried  Surgery: not tried  TENS unit: not tried  Topical pain relievers: not tried  Other healthcare providers patient is seeing for back pain: Physical Therapist    She eats 0-1 servings of fruits and vegetables daily.She consumes 1 sweetened beverage(s) daily.She exercises with enough effort to increase her heart rate 9 or less minutes per day.  She exercises with enough effort to increase her heart rate 3 or less days per week. She is missing 1 dose(s) of medications per week.   pain comes and goes. Physical therapy that she has been doing has not helped. Massages help. 2-3 times per week. Improved pain little.       After giving birth to my last child, she used to put a cloth to tie her stomach so that it does not enlarge. She has pain , last cycle -      Review of Systems   Musculoskeletal: Positive for back pain.      Constitutional, HEENT, cardiovascular, pulmonary, gi and gu systems are negative, except as otherwise noted.      Objective    /64 (BP Location: Right arm, Patient Position: Sitting, Cuff Size: Adult Regular)   Pulse 97   Temp 97.5  F (36.4  C) (Temporal)   Resp 16   Ht 1.634 m (5' 4.33\")   Wt 64.4 kg (142 lb)   SpO2 99%   Breastfeeding No   BMI 24.12 kg/m    Body mass index is 24.12 kg/m .  Physical Exam   GENERAL: healthy, alert and no distress  NECK: no adenopathy, no asymmetry, masses, or scars and thyroid normal to palpation  RESP: lungs clear to auscultation - no rales, rhonchi or wheezes  CV: regular rate and rhythm, normal S1 S2, no S3 or S4, no murmur, click or rub, no peripheral edema and peripheral pulses strong  ABDOMEN: soft, nontender, no hepatosplenomegaly, no masses and bowel sounds normal  MS: no gross musculoskeletal defects noted, no edema      "

## 2022-09-13 NOTE — RESULT ENCOUNTER NOTE
Please call patient with results.  She needs a Wolof .  Please inform patient that blood chemistries show marginally elevation in one of the liver enzymes.  This could be due to recent viral illness or use of excess amount of alcohol.  I would recommend abstaining from alcohol use and avoiding over-the-counter use of Tylenol to prevent any further injury to the liver and have these labs rechecked in about 3 months for follow-up.  Rest of the endometrium markers are negative.  Urinalysis did not show any sign of infection.  Future orders placed for labs .  I recommend a follow-up in the next month if her abdominal pain does not improve.

## 2022-09-19 ENCOUNTER — TELEPHONE (OUTPATIENT)
Dept: FAMILY MEDICINE | Facility: OTHER | Age: 32
End: 2022-09-19

## 2022-09-19 NOTE — TELEPHONE ENCOUNTER
----- Message from Dayami Ramirez MD sent at 9/13/2022 11:50 AM CDT -----  Please call patient with results.  She needs a Lithuanian .  Please inform patient that blood chemistries show marginally elevation in one of the liver enzymes.  This could be due to recent viral illness or use of excess amount of alcohol.  I would recommend abstaining from alcohol use and avoiding over-the-counter use of Tylenol to prevent any further injury to the liver and have these labs rechecked in about 3 months for follow-up.  Rest of the endometrium markers are negative.  Urinalysis did not show any sign of infection.  Future orders placed for labs .  I recommend a follow-up in the next month if her abdominal pain does not improve.

## 2022-09-19 NOTE — TELEPHONE ENCOUNTER
Attempted to reach the patient with the following information.  Left message for patient to return call to clinic.     Padmaja Trinidad MA

## 2022-09-23 NOTE — PROGRESS NOTES
"Discharge Note    Progress reporting period is from initial evaluation date (please see noted date below) to Sep 2, 2022.  Linked Episodes   Type: Episode: Status: Noted: Resolved: Last update: Updated by:   PHYSICAL THERAPY Low Back  7-18-22 Active 7/18/2022 9/2/2022  3:40 PM Hilligoss, Amanda K, PT      Comments:   PHYSICAL THERAPY Right Shoulder 7-25-22 Active 7/25/2022 9/2/2022  3:40 PM Hilligoss, Amanda K, PT      Comments:       Robert failed to follow up and current status is unknown.  Please see information below for last relevant information on current status.  Patient seen for 5 visits.    SUBJECTIVE  Subjective changes noted by patient:  Intermittent LBP. Prolonged standing at worse  increases pain level. Muscle relaxor medication decreases pain. Pt consistent with HEP. Continues to feel much improved with R shld, no limitations with reaching and lifting ability. Pt requesting focus on lumbar manual treatment today.  .  Current pain level is 2/10.     Previous pain level was  5/10.   Changes in function:  Yes (See Goal flowsheet attached for changes in current functional level)  Adverse reaction to treatment or activity: None    OBJECTIVE  Changes noted in objective findings: B QL restriction/tension, decreased with manual therapy.   From 8/26: R shoulder AROM WNL and painfree. No longer with end range pain with R hip ER. Prone modified plank 60\".        ASSESSMENT/PLAN  Diagnosis: Low Back pain and R Shoulder pain  Updated problem list and treatment plan:   Pain - HEP  Decreased ROM/flexibility - HEP  Decreased function - HEP  Impaired muscle performance - HEP  Impaired posture - HEP  STG/LTGs have been met or progress has been made towards goals:  Yes, please see goal flowsheet for most current information  Assessment of Progress: current status is unknown.    Last current status:     Self Management Plans:  HEP  I have re-evaluated this patient and find that the nature, scope, duration and " intensity of the therapy is appropriate for the medical condition of the patient.  Robert continues to require the following intervention to meet STG and LTG's:  HEP.    Recommendations:  Discharge with current home program.  Patient to follow up with MD as needed.    Please refer to the daily flowsheet for treatment today, total treatment time and time spent performing 1:1 timed codes.

## 2022-10-10 PROBLEM — G89.29 CHRONIC RIGHT SHOULDER PAIN: Status: RESOLVED | Noted: 2022-07-25 | Resolved: 2022-10-10

## 2022-10-10 PROBLEM — M54.50 ACUTE MIDLINE LOW BACK PAIN WITHOUT SCIATICA: Status: RESOLVED | Noted: 2022-07-19 | Resolved: 2022-10-10

## 2022-10-10 PROBLEM — M25.511 CHRONIC RIGHT SHOULDER PAIN: Status: RESOLVED | Noted: 2022-07-25 | Resolved: 2022-10-10

## 2022-12-01 NOTE — ADDENDUM NOTE
Addended by: REEMA FRENCH on: 10/20/2017 01:30 PM     Modules accepted: Orders     Include Z78.9 (Other Specified Conditions Influencing Health Status) As An Associated Diagnosis?: No Render Post-Care Instructions In Note?: yes Number Of Freeze-Thaw Cycles: 3 freeze-thaw cycles Medical Necessity Information: It is in your best interest to select a reason for this procedure from the list below. All of these items fulfill various CMS LCD requirements except the new and changing color options. Spray Paint Text: The liquid nitrogen was applied to the skin utilizing a spray paint frosting technique. Detail Level: Detailed Medical Necessity Clause: This procedure was medically necessary because the lesions that were treated were: Consent: The patient's consent was obtained including but not limited to risks of crusting, scabbing, blistering, scarring, darker or lighter pigmentary change, recurrence, incomplete removal and infection. Duration Of Freeze Thaw-Cycle (Seconds): 2 Post-Care Instructions: I reviewed with the patient in detail post-care instructions. Patient is to wear sunprotection, and avoid picking at any of the treated lesions. Pt may apply Vaseline to crusted or scabbing areas.

## 2023-02-07 ENCOUNTER — OFFICE VISIT (OUTPATIENT)
Dept: OBGYN | Facility: CLINIC | Age: 33
End: 2023-02-07
Payer: COMMERCIAL

## 2023-02-07 VITALS
WEIGHT: 137.1 LBS | DIASTOLIC BLOOD PRESSURE: 86 MMHG | BODY MASS INDEX: 23.29 KG/M2 | OXYGEN SATURATION: 91 % | SYSTOLIC BLOOD PRESSURE: 133 MMHG

## 2023-02-07 DIAGNOSIS — R30.0 DYSURIA: ICD-10-CM

## 2023-02-07 DIAGNOSIS — Z31.69 ENCOUNTER FOR PRECONCEPTION CONSULTATION: Primary | ICD-10-CM

## 2023-02-07 LAB
ALBUMIN UR-MCNC: 20 MG/DL
APPEARANCE UR: CLEAR
BACTERIA #/AREA URNS HPF: ABNORMAL /HPF
BILIRUB UR QL STRIP: NEGATIVE
COLOR UR AUTO: YELLOW
GLUCOSE UR STRIP-MCNC: NEGATIVE MG/DL
HGB UR QL STRIP: NEGATIVE
KETONES UR STRIP-MCNC: NEGATIVE MG/DL
LEUKOCYTE ESTERASE UR QL STRIP: NEGATIVE
NITRATE UR QL: NEGATIVE
PH UR STRIP: 6 [PH] (ref 5–7)
RBC #/AREA URNS AUTO: ABNORMAL /HPF
SP GR UR STRIP: 1.03 (ref 1–1.03)
SQUAMOUS #/AREA URNS AUTO: ABNORMAL /LPF
UROBILINOGEN UR STRIP-MCNC: NORMAL MG/DL
WBC #/AREA URNS AUTO: ABNORMAL /HPF

## 2023-02-07 PROCEDURE — 99214 OFFICE O/P EST MOD 30 MIN: CPT | Performed by: OBSTETRICS & GYNECOLOGY

## 2023-02-07 PROCEDURE — 81001 URINALYSIS AUTO W/SCOPE: CPT | Performed by: OBSTETRICS & GYNECOLOGY

## 2023-02-07 RX ORDER — FOLIC ACID 1 MG/1
1 TABLET ORAL DAILY
Qty: 90 TABLET | Refills: 3 | Status: SHIPPED | OUTPATIENT
Start: 2023-02-07

## 2023-02-07 NOTE — PROGRESS NOTES
Robert is a 33 year old  referred here by self and souse on the mobile phone. for consultation regarding infertility..  VISIT WITH Taiwanese INTERPARATER ON THE PHONE.    The patient had her last Depo-Provera in 2022.  Menses resumed in 2022.  Her menses have been regular in the last 3 months.  Have not tried to get pregnant since then and nothing has happened yet.  We are concerned about infertility.  They have had infertility work-up  done in the past prior to the last pregnancy and everything was normal.    She is presently not taking any prenatal vitamins..  She has another complaint of painful urination .  She denies any back pains.      ROS: Ten point review of systems was reviewed and negative except the above.    Gyne: - abn pap (last pap ), - STD's    History reviewed. No pertinent past medical history.  Past Surgical History:   Procedure Laterality Date     laproscopy      infertility     Patient Active Problem List   Diagnosis     Encounter for surveillance of injectable contraceptive       ALL/Meds: Her medication and allergy histories were reviewed and are documented in their appropriate chart areas.    SH: - tob, - EtOH,     FH: Her family history was reviewed and documented in its appropriate chart area.    PE: /86 (BP Location: Right arm, Patient Position: Sitting, Cuff Size: Adult Regular)   Wt 62.2 kg (137 lb 1.6 oz)   LMP 2023   SpO2 91%   BMI 23.29 kg/m    Body mass index is 23.29 kg/m .    General Appearance:  healthy, alert, active, no distress  HEENT: NCAT  Results for orders placed or performed in visit on 23   UA with Microscopic reflex to Culture - lab collect     Status: Abnormal    Specimen: Urine, Midstream   Result Value Ref Range    Color Urine Yellow Colorless, Straw, Light Yellow, Yellow    Appearance Urine Clear Clear    Glucose Urine Negative Negative mg/dL    Bilirubin Urine Negative Negative    Ketones Urine Negative Negative mg/dL     Specific Gravity Urine 1.026 0.999 - 1.035    Blood Urine Negative Negative    pH Urine 6.0 5.0 - 7.0    Protein Albumin Urine 20 (A) Negative mg/dL    Nitrite Urine Negative Negative    Leukocyte Esterase Urine Negative Negative    Urobilinogen Urine Normal Normal, 2.0 mg/dL   Urine Microscopic     Status: Abnormal   Result Value Ref Range    Bacteria Urine Few (A) None Seen /HPF    RBC Urine 0-2 0-2 /HPF /HPF    WBC Urine 0-5 0-5 /HPF /HPF    Squamous Epithelials Urine Moderate (A) None Seen /LPF    Narrative    Urine Culture not indicated       A/P    ICD-10-CM    1. Encounter for preconception consultation  Z31.69 folic acid (FOLVITE) 1 MG tablet     UA with Microscopic reflex to Culture - lab collect     UA with Microscopic reflex to Culture - lab collect     Urine Microscopic      2. Dysuria  R30.0 UA with Microscopic reflex to Culture - lab collect     UA with Microscopic reflex to Culture - lab collect     Urine Microscopic        I discussed with patient and her  that they have really been actively trying to get pregnant for 3 months.  At this point I do not see any reason for any work-up.  We discussed that infertility would be a work-up if there is no pregnancy in a years trying.  However anxious so she will return to the clinic in about 6 months for reevaluation.  CEPHAS AGBEH, MD.

## 2023-02-07 NOTE — PATIENT INSTRUCTIONS
To Schedule an Appointment 24/7  Call: [phone]    If you have any questions regarding your visit, Please contact your care team.  [facility]: [phone]  [facility] HOURS TELEPHONE NUMBER   Cephas Agbeh, M.D.      Dimas Quesada-Surgery Scheduler  Charity-Surgery Scheduler         Monday - [place]:    8:00 am-4:45 pm  Tuesday - [place]:   8:00 am-4:45 pm  Friday-[place]:       8:00 am-4:45 pm  Typical Surgery Day:  Wednesday [facility]   [address]   [place] [address] [phone]   Fax [phone]    Imaging Scheduling all locations  [phone]      [facility]   [address] [place] [address] [address]   [place], [place] [address] [phone]    [facility]  [address] [address]  [phone]  Fax [phone]     Urgent Care locations:  [facility] Monday-Friday                               10 am - 8 pm  Saturday and Sunday                      9 am - 5 pm  Monday-Friday                              10 am- 8 pm  Saturday and Sunday                      9 am - 5 pm    [phone]   **Surgeries** Our Surgery Schedulers will contact you to schedule. If you do not receive a call within 3 business days, please call [phone].  If you need a medication refill, please contact your pharmacy. Please allow 3 business days for your refill to be completed.  As always, Thank you for trusting us with your healthcare needs!  see additional instructions from your care team below

## 2023-02-27 ENCOUNTER — OFFICE VISIT (OUTPATIENT)
Dept: DERMATOLOGY | Facility: CLINIC | Age: 33
End: 2023-02-27
Payer: COMMERCIAL

## 2023-02-27 DIAGNOSIS — L20.9 ATOPIC DERMATITIS, UNSPECIFIED TYPE: Primary | ICD-10-CM

## 2023-02-27 PROCEDURE — 99204 OFFICE O/P NEW MOD 45 MIN: CPT | Performed by: DERMATOLOGY

## 2023-02-27 RX ORDER — TRIAMCINOLONE ACETONIDE 0.25 MG/G
CREAM TOPICAL
Qty: 80 G | Refills: 11 | Status: SHIPPED | OUTPATIENT
Start: 2023-02-27 | End: 2023-02-27

## 2023-02-27 RX ORDER — TACROLIMUS 1 MG/G
OINTMENT TOPICAL
Qty: 60 G | Refills: 11 | Status: SHIPPED | OUTPATIENT
Start: 2023-02-27 | End: 2023-02-27

## 2023-02-27 RX ORDER — TRIAMCINOLONE ACETONIDE 0.25 MG/G
CREAM TOPICAL
Qty: 80 G | Refills: 11 | Status: SHIPPED | OUTPATIENT
Start: 2023-02-27

## 2023-02-27 RX ORDER — TACROLIMUS 1 MG/G
OINTMENT TOPICAL
Qty: 60 G | Refills: 11 | Status: SHIPPED | OUTPATIENT
Start: 2023-02-27

## 2023-02-27 NOTE — NURSING NOTE
Robert Anand's goals for this visit include:   Chief Complaint   Patient presents with     Derm Problem     Patient here do to skin discoloration, neck, inside ear,  back patient says that it becomes hot and itchy sometimes.       She requests these members of her care team be copied on today's visit information:     PCP: Dayami Ramirez    Referring Provider:  Referred Self, MD  No address on file    LMP 01/08/2023     Do you need any medication refills at today's visit? Nena HINES

## 2023-02-27 NOTE — PATIENT INSTRUCTIONS
For 2 weeks, apply triamcinolone 0.025% cream twice daily to the rash on the neck, ears, and trunk/extremities.   After 2 weeks, switch protopic 0.1% ointment twice daily. Can taper frequency as able.       1. Pendant 2 semaines, appliquez la crème de triamcinolone à 0,025% deux fois par jour alfonzo l'éruption cutanée du cou, shahzad oreilles et du tronc/shahzad extrémités.  2. Après 2 semaines, changez de pommade protopic 0,1% deux fois par jour. Peut diminuer la fréquence autant que possible.

## 2023-02-27 NOTE — PROGRESS NOTES
[facility] Dermatology Note  Encounter Date: Feb 27, 2023  Office Visit     Dermatology Problem List:  1. Eczema  - Current tx: triamcinolone 0.025% cream BID x 2 weeks, then Protopic ointment BID    ____________________________________________    Assessment & Plan:    #  Eczematous dermatitis, neck, upper back, upper arms, hands. Chronic, flare/not at goal.   Favor atopic dermatitis per history and clinical presentation. Reviewed treament with topical steroids as first line treatment. Discussed Dupixent injections if not resolving with topicals, though it will likely not be necessary.   - Start triamcinolone 0.025% cream BID x 2 weeks, then switch to Protopic.  - Apply Protopic 0.1% ointment BID for maintenance  - Future consideration: Dupixent if not resolving with topicals.  - Follow up in 3 months    # Irritant dermatitis on the hands per pt history.  - Okay to use triamcinolone and Protopic in these areas as well.     Procedures Performed:   None.    Follow-up: 3 month(s) in-person fpr follow up, or earlier for new or changing lesions    Staff and Scribe:     Scribe Disclosure:   I, Rayshawn Donovan, am serving as a scribe to document services personally performed by this physician, Dr. Gibran Singh, based on data collection and the provider's statements to me.     Provider Disclosure:   The documentation recorded by the scribe accurately reflects the services I personally performed and the decisions made by me.    Gibran Singh MD    Department of Dermatology  [facility]: Phone: [phone], Fax:[phone]  [facility]: Phone: [phone] Fax: [phone]  ____________________________________________    CC: Derm Problem (Patient here do to skin discoloration, neck, inside ear,  back patient says that it becomes hot and itchy sometimes.)    HPI:  Ms. Godelive L  Kika is a(n) 33 year old female who presents today as a new patient for a spot check.    Self referred.     Today, she reports skin discoloration on the neck, back, and inner ear that become hot and itchy from time to time. She reports that she had been seeing a dermatology in [place] and was provided a cream to use, which had cleared up the spots. It had been 6-7 years since she had used the cream. She reports it is very itchy. Denies any history of asthma or seasonal allergies, denies any family history either. It does not get worse during any particular season.     She also reports that she had been cleaning her bathroom about 6 months ago, and reported irritation and a rash on the hands due to the chemicals.     The patient otherwise denies any new or concerning lesions. No bleeding, painful, pruritic, or changing lesions. Health otherwise stable. No other skin concerns.       Labs Reviewed:  N/A    Physical Exam:  Vitals: LMP 01/08/2023   SKIN: Focused examination of back, hands, arms, abdomen was performed.  - Thin pink to hyperpigmented minimally scaly plaques on the ears, neck, back, lateral upper arms.  - Mild pink erythema and scale on the hands.    - No other lesions of concern on areas examined.     Medications:  Current Outpatient Medications   Medication     folic acid (FOLVITE) 1 MG tablet     ibuprofen (ADVIL/MOTRIN) 600 MG tablet     cyclobenzaprine (FLEXERIL) 5 MG tablet     No current facility-administered medications for this visit.      Past Medical History:   Patient Active Problem List   Diagnosis     Encounter for surveillance of injectable contraceptive     No past medical history on file.

## 2023-02-27 NOTE — LETTER
2/27/2023         RE: Robert Anand  [address] [facility] [address]        Dear Colleague,    Thank you for referring your patient, Robert Anand, to the [facility]. Please see a copy of my visit note below.    [facility] Dermatology Note  Encounter Date: Feb 27, 2023  Office Visit     Dermatology Problem List:  1. Eczema  - Current tx: triamcinolone 0.025% cream BID x 2 weeks, then Protopic ointment BID    ____________________________________________    Assessment & Plan:    #  Eczematous dermatitis, neck, upper back, upper arms, hands. Chronic, flare/not at goal.   Favor atopic dermatitis per history and clinical presentation. Reviewed treament with topical steroids as first line treatment. Discussed Dupixent injections if not resolving with topicals, though it will likely not be necessary.   - Start triamcinolone 0.025% cream BID x 2 weeks, then switch to Protopic.  - Apply Protopic 0.1% ointment BID for maintenance  - Future consideration: Dupixent if not resolving with topicals.  - Follow up in 3 months    # Irritant dermatitis on the hands per pt history.  - Okay to use triamcinolone and Protopic in these areas as well.     Procedures Performed:   None.    Follow-up: 3 month(s) in-person fpr follow up, or earlier for new or changing lesions    Staff and Scribe:     Scribe Disclosure:   I, Rayshawn Donovan, am serving as a scribe to document services personally performed by this physician, Dr. Gibran Singh, based on data collection and the provider's statements to me.     Provider Disclosure:   The documentation recorded by the scribe accurately reflects the services I personally performed and the decisions made by me.    Gibran Singh MD    Department of Dermatology  [facility]: Phone: [phone], Fax:[phone]  [facility]  Center: Phone: [phone] Fax: [phone]  ____________________________________________    CC: Derm Problem (Patient here do to skin discoloration, neck, inside ear,  back patient says that it becomes hot and itchy sometimes.)    HPI:  Ms. Robert Anand is a(n) 33 year old female who presents today as a new patient for a spot check.    Self referred.     Today, she reports skin discoloration on the neck, back, and inner ear that become hot and itchy from time to time. She reports that she had been [place] a dermatology [place] and was provided a cream to use, which had cleared up the spots. It had been 6-7 years since she had used the cream. She reports it is very itchy. Denies any history of asthma or seasonal allergies, denies any family history either. It does not get worse during any particular season.     She also reports that she had been cleaning her bathroom about 6 months ago, and reported irritation and a rash on the hands due to the chemicals.     The patient otherwise denies any new or concerning lesions. No bleeding, painful, pruritic, or changing lesions. Health otherwise stable. No other skin concerns.       Labs Reviewed:  N/A    Physical Exam:  Vitals: LMP 01/08/2023   SKIN: Focused examination of back, hands, arms, abdomen was performed.  - Thin pink to hyperpigmented minimally scaly plaques on the ears, neck, back, lateral upper arms.  - Mild pink erythema and scale on the hands.    - No other lesions of concern on areas examined.     Medications:  Current Outpatient Medications   Medication     folic acid (FOLVITE) 1 MG tablet     ibuprofen (ADVIL/MOTRIN) 600 MG tablet     cyclobenzaprine (FLEXERIL) 5 MG tablet     No current facility-administered medications for this visit.      Past Medical History:   Patient Active Problem List   Diagnosis     Encounter for surveillance of injectable contraceptive     No past medical history on file.           Again, thank you for allowing  me to participate in the care of your patient.        Sincerely,        Gibran Singh MD

## 2023-03-03 ENCOUNTER — TELEPHONE (OUTPATIENT)
Dept: DERMATOLOGY | Facility: CLINIC | Age: 33
End: 2023-03-03
Payer: COMMERCIAL

## 2023-03-07 NOTE — TELEPHONE ENCOUNTER
Prior Authorization Not Needed per Insurance    Medication: Tacrolimus 0.1% oint - PA not needed  Insurance Company: [employer] - Phone [phone] Fax [phone]  Expected CoPay:      Pharmacy [facility]illing the Rx: [id number] - [place], [place] - [address] [facility]  Pharmacy Notified: Yes  Patient Notified: Yes    PA already approved earlier this year. Confirmed with pharmacy they had paid claim.

## 2023-05-06 ENCOUNTER — HEALTH MAINTENANCE LETTER (OUTPATIENT)
Age: 33
End: 2023-05-06

## 2023-05-08 ENCOUNTER — OFFICE VISIT (OUTPATIENT)
Dept: DERMATOLOGY | Facility: CLINIC | Age: 33
End: 2023-05-08
Payer: COMMERCIAL

## 2023-05-08 ENCOUNTER — TELEPHONE (OUTPATIENT)
Dept: DERMATOLOGY | Facility: CLINIC | Age: 33
End: 2023-05-08

## 2023-05-08 DIAGNOSIS — L20.9 ATOPIC DERMATITIS, UNSPECIFIED TYPE: Primary | ICD-10-CM

## 2023-05-08 DIAGNOSIS — L70.0 ACNE VULGARIS: ICD-10-CM

## 2023-05-08 PROCEDURE — 99214 OFFICE O/P EST MOD 30 MIN: CPT | Performed by: DERMATOLOGY

## 2023-05-08 RX ORDER — FLUOCINONIDE 0.5 MG/G
CREAM TOPICAL
Qty: 30 G | Refills: 11 | Status: SHIPPED | OUTPATIENT
Start: 2023-05-08

## 2023-05-08 RX ORDER — PIMECROLIMUS 10 MG/G
CREAM TOPICAL
Qty: 60 G | Refills: 11 | Status: ON HOLD | OUTPATIENT
Start: 2023-05-08 | End: 2023-11-28

## 2023-05-08 RX ORDER — TRETINOIN 0.25 MG/G
CREAM TOPICAL
Qty: 45 G | Refills: 11 | Status: SHIPPED | OUTPATIENT
Start: 2023-05-08

## 2023-05-08 NOTE — TELEPHONE ENCOUNTER
Prior Authorization Retail Medication Request    Medication/Dose: Pimecrolimus  ICD code (if different than what is on RX):    Previously Tried and Failed: Tried and failed tacrolimus  Rationale:      Insurance Name:  APOLINAR JAIME  Insurance ID:  [id number]      Pharmacy Information (if different than what is on RX)  Name:  [facility]  Phone:  [phone]

## 2023-05-08 NOTE — NURSING NOTE
Robert Anand's goals for this visit include:   Chief Complaint   Patient presents with     RECHECK     Patient here for follow up on skin discoloration, cream is helping a little bit but it does get itchy        She requests these members of her care team be copied on today's visit information:     PCP: Dayami Ramirez    Referring Provider:  No referring provider defined for this encounter.    There were no vitals taken for this visit.    Do you need any medication refills at today's visit? Nena HNIES

## 2023-05-08 NOTE — PATIENT INSTRUCTIONS
I recommend Thermal Nomad sunscreens. These can come in tinted and untinted forms, though they can be very expensive. You can use an alternative as long as it has an SPF of 30 or greater.         From [employer] UV Physical Broad-Spectrum SPF 41 Sunscreen - Tinted (3 oz.)

## 2023-05-08 NOTE — PROGRESS NOTES
[facility] Dermatology Note  Encounter Date: May 8, 2023  Office Visit     Dermatology Problem List:  1. Dermatitis, bilateral neck.   - Current tx: lidex 0.05% cream BID x 1 week, then elidel cream thereafter  - Prior tx: triamcinolone 0.025% cream BID x 2 weeks, then Protopic ointment BID  ____________________________________________     Assessment & Plan:     # Eczematous dermatitis, neck, upper back, upper arms, hands. Chronic, flare/not at goal.   Favor atopic dermatitis, although less likely considered lichen planus pigmentosus.   Pt reports relief of symptoms with triamcinolone and Protopic, though rash still persists. Has been using her ointments once daily. Due to inefficacy of current treatment, will modify topical regimen to higher strength. If not improving on higher strength, consider switching to Dupixent.   - Stop triamcinolone 0.025% cream, switch to fluocinonide 0.05% cream BID x 7 days. Only use for flares.  - Switch from Protopic to pimecrolimus 0.1% cream BID for maintenance. Taper frequency as improving.   - Future consideration: Dupixent if not resolving with topicals, biopsy  - Re-check in 3 months     # Acne vulgaris. Chronic, flare.   # Melasma. Chronic, flare.   - Apply tretinoin 0.025% cream nightly as tolerated, reviewed irritation and dryness. Recommended starting every other night and increase frequency if not irritated. Okay to mix with moisturizer to reduce dryness.   - Recommended daily sunscreen use.    Procedures Performed:   None.    Follow-up: 3 month(s) in-person for follow up, or earlier for new or changing lesions    Staff and Scribe:     Scribe Disclosure:   I, Rayshawn Donovan, am serving as a scribe to document services personally performed by this physician, Dr. Gibran Singh, based on data collection and the provider's statements to me.       Provider Disclosure:   The documentation recorded by the scribe accurately reflects the services I personally  performed and the decisions made by me.    Gibrna Singh MD    Department of Dermatology  [facility]: Phone: [phone], Fax:[phone]  [facility]: Phone: [phone] Fax: [phone]  ____________________________________________    CC: RECHECK (Patient here for follow up on skin discoloration, cream is helping a little bit but it does get itchy )    HPI:  Ms. Robert Anand is a(n) 33 year old female who presents today as a return patient for rash follow up.    Last seen 2/27/23 for eczematous dermatitis. At that time, pt instructed to start triamcinolone cream BID for 2 weeks then switch to Protopic.     Today, she reports her rash is sometimes still itchy. Her topicals are helpful for symptoms.     The patient otherwise denies any new or concerning lesions. No bleeding, painful, pruritic, or changing lesions. Health otherwise stable. No other skin concerns.       Labs Reviewed:  N/A    Physical Exam:  Vitals: There were no vitals taken for this visit.  SKIN: Focused examination of chest, neck was performed.  - Few acneiform papules on the bilateral cheeks.  - Brown patches on bilateral cheeks.  - Few scaly oval shaped thin plaques on the bilateral neck.    - No other lesions of concern on areas examined.     Medications:  Current Outpatient Medications   Medication     tacrolimus (PROTOPIC) 0.1 % external ointment     triamcinolone (KENALOG) 0.025 % cream     cyclobenzaprine (FLEXERIL) 5 MG tablet     folic acid (FOLVITE) 1 MG tablet     ibuprofen (ADVIL/MOTRIN) 600 MG tablet     No current facility-administered medications for this visit.      Past Medical History:   Patient Active Problem List   Diagnosis     Encounter for surveillance of injectable contraceptive     No past medical history on file.

## 2023-05-08 NOTE — LETTER
5/8/2023         RE: Robert Anand  [address] [facility] [address] [facility] [address]        Dear Colleague,    Thank you for referring your patient, Robert Anand, to the [facility]. Please see a copy of my visit note below.    [facility] Dermatology Note  Encounter Date: May 8, 2023  Office Visit     Dermatology Problem List:  1. Dermatitis, bilateral neck.   - Current tx: lidex 0.05% cream BID x 1 week, then elidel cream thereafter  - Prior tx: triamcinolone 0.025% cream BID x 2 weeks, then Protopic ointment BID  ____________________________________________     Assessment & Plan:     # Eczematous dermatitis, neck, upper back, upper arms, hands. Chronic, flare/not at goal.   Favor atopic dermatitis, although less likely considered lichen planus pigmentosus.   Pt reports relief of symptoms with triamcinolone and Protopic, though rash still persists. Has been using her ointments once daily. Due to inefficacy of current treatment, will modify topical regimen to higher strength. If not improving on higher strength, consider switching to Dupixent.   - Stop triamcinolone 0.025% cream, switch to fluocinonide 0.05% cream BID x 7 days. Only use for flares.  - Switch from Protopic to pimecrolimus 0.1% cream BID for maintenance. Taper frequency as improving.   - Future consideration: Dupixent if not resolving with topicals, biopsy  - Re-check in 3 months     # Acne vulgaris. Chronic, flare.   # Melasma. Chronic, flare.   - Apply tretinoin 0.025% cream nightly as tolerated, reviewed irritation and dryness. Recommended starting every other night and increase frequency if not irritated. Okay to mix with moisturizer to reduce dryness.   - Recommended daily sunscreen use.    Procedures Performed:   None.    Follow-up: 3 month(s) in-person for follow up, or earlier for new or changing lesions    Staff and Scribe:     Scribe Disclosure:   Rayshawn PEREZ, am serving as a scribe to  document services personally performed by this physician, Dr. Gibran Singh, based on data collection and the provider's statements to me.       Provider Disclosure:   The documentation recorded by the scribe accurately reflects the services I personally performed and the decisions made by me.    Gibran Singh MD    Department of Dermatology  [facility]: Phone: [phone], Fax:[phone]  [facility]: Phone: [phone] Fax: [phone]  ____________________________________________    CC: RECHECK (Patient here for follow up on skin discoloration, cream is helping a little bit but it does get itchy )    HPI:  Ms. Robert Anand is a(n) 33 year old female who presents today as a return patient for rash follow up.    Last seen 2/27/23 for eczematous dermatitis. At that time, pt instructed to start triamcinolone cream BID for 2 weeks then switch to Protopic.     Today, she reports her rash is sometimes still itchy. Her topicals are helpful for symptoms.     The patient otherwise denies any new or concerning lesions. No bleeding, painful, pruritic, or changing lesions. Health otherwise stable. No other skin concerns.       Labs Reviewed:  N/A    Physical Exam:  Vitals: There were no vitals taken for this visit.  SKIN: Focused examination of chest, neck was performed.  - Few acneiform papules on the bilateral cheeks.  - Brown patches on bilateral cheeks.  - Few scaly oval shaped thin plaques on the bilateral neck.    - No other lesions of concern on areas examined.     Medications:  Current Outpatient Medications   Medication     tacrolimus (PROTOPIC) 0.1 % external ointment     triamcinolone (KENALOG) 0.025 % cream     cyclobenzaprine (FLEXERIL) 5 MG tablet     folic acid (FOLVITE) 1 MG tablet     ibuprofen (ADVIL/MOTRIN) 600 MG tablet     No current facility-administered medications for  this visit.      Past Medical History:   Patient Active Problem List   Diagnosis     Encounter for surveillance of injectable contraceptive     No past medical history on file.       Again, thank you for allowing me to participate in the care of your patient.        Sincerely,        Gibran Singh MD

## 2023-05-15 NOTE — TELEPHONE ENCOUNTER
Central Prior Authorization Team   Phone: [phone]    PA Initiation    Medication: Pimecrolimus  Insurance Company: [employer] - Phone [phone] Fax [phone]  Pharmacy Filling the Rx: [place], [place] - [address] [address] [place], [place] [address], [address]  Filling Pharmacy Phone: [phone]  Filling Pharmacy Fax:    Start Date: 5/15/2023

## 2023-05-16 ENCOUNTER — OFFICE VISIT (OUTPATIENT)
Dept: OBGYN | Facility: CLINIC | Age: 33
End: 2023-05-16
Payer: COMMERCIAL

## 2023-05-16 VITALS
DIASTOLIC BLOOD PRESSURE: 86 MMHG | HEART RATE: 92 BPM | SYSTOLIC BLOOD PRESSURE: 128 MMHG | BODY MASS INDEX: 21.02 KG/M2 | WEIGHT: 123.7 LBS

## 2023-05-16 DIAGNOSIS — N92.6 MISSED MENSES: Primary | ICD-10-CM

## 2023-05-16 LAB — HCG UR QL: POSITIVE

## 2023-05-16 PROCEDURE — 81025 URINE PREGNANCY TEST: CPT | Performed by: OBSTETRICS & GYNECOLOGY

## 2023-05-16 PROCEDURE — 99214 OFFICE O/P EST MOD 30 MIN: CPT | Performed by: OBSTETRICS & GYNECOLOGY

## 2023-05-16 RX ORDER — PRENATAL VIT/IRON FUM/FOLIC AC 27MG-0.8MG
1 TABLET ORAL DAILY
Qty: 90 TABLET | Refills: 3 | Status: SHIPPED | OUTPATIENT
Start: 2023-05-16

## 2023-05-16 NOTE — PROGRESS NOTES
Robert is a 33 year old  referred here by self for consultation regarding pregnancy confirmation..  LMP of 3/6/23. Has some nausea.Macedonian   ROS: Ten point review of systems was reviewed and negative except the above.    Gyne: - abn pap (last pap ), - STD's    History reviewed. No pertinent past medical history.  Past Surgical History:   Procedure Laterality Date     laproscopy  2013    infertility     Patient Active Problem List   Diagnosis     Encounter for surveillance of injectable contraceptive       ALL/Meds: Her medication and allergy histories were reviewed and are documented in their appropriate chart areas.    SH: - tob, - EtOH,     FH: Her family history was reviewed and documented in its appropriate chart area.    PE: /86 (BP Location: Right arm, Patient Position: Sitting, Cuff Size: Adult Regular)   Pulse 92   Wt 56.1 kg (123 lb 11.2 oz)   BMI 21.02 kg/m    Body mass index is 21.02 kg/m .    General Appearance:  healthy, alert, active, no distress  HEENT: NCAT    Results for orders placed or performed in visit on 23   HCG Qual, Urine ([id number])     Status: Abnormal   Result Value Ref Range    hCG Urine Qualitative Positive (A) Negative       A/P    ICD-10-CM    1. Missed menses  N92.6 HCG Qual, Urine ([id number])     hCG Quantitative Pregnancy     HCG Qual, Urine ([id number])     US OB < 14 Weeks Single     Prenatal Vit-Fe Fumarate-FA (PRENATAL MULTIVITAMIN W/IRON) 27-0.8 MG tablet        NEW PREGNANCY. 10 weeks 1 day.  NEW PRENTAL APPOINTMENT in one week    Total time preparing to see patient with reviewing prior encounter and labs, face to face time,  and coordinating care on the same calendar date:      CEPHAS AGBEH, MD.

## 2023-05-17 NOTE — TELEPHONE ENCOUNTER
Prior Authorization Approval    Authorization Effective Date: 2/15/2023  Authorization Expiration Date: 5/15/2024  Medication: Pimecrolimus  Approved Dose/Quantity:   Reference #:     Insurance Company: [employer] - Phone [phone] Fax [phone]  Expected CoPay:       CoPay Card Available:      Foundation Assistance Needed:    Which Pharmacy is filling the prescription (Not needed for infusion/clinic administered): [facility] - [place], [place] - [address], [address]  Pharmacy Notified: Yes  Patient Notified: No

## 2023-05-19 ENCOUNTER — ANCILLARY PROCEDURE (OUTPATIENT)
Dept: ULTRASOUND IMAGING | Facility: CLINIC | Age: 33
End: 2023-05-19
Attending: OBSTETRICS & GYNECOLOGY
Payer: COMMERCIAL

## 2023-05-19 DIAGNOSIS — N92.6 MISSED MENSES: ICD-10-CM

## 2023-05-19 PROCEDURE — 76801 OB US < 14 WKS SINGLE FETUS: CPT

## 2023-05-22 LAB
ABO/RH(D): NORMAL
ANTIBODY SCREEN: NEGATIVE
SPECIMEN EXPIRATION DATE: NORMAL

## 2023-05-23 ENCOUNTER — PRENATAL OFFICE VISIT (OUTPATIENT)
Dept: OBGYN | Facility: CLINIC | Age: 33
End: 2023-05-23
Payer: COMMERCIAL

## 2023-05-23 VITALS
SYSTOLIC BLOOD PRESSURE: 121 MMHG | HEART RATE: 91 BPM | WEIGHT: 125.8 LBS | DIASTOLIC BLOOD PRESSURE: 73 MMHG | BODY MASS INDEX: 21.37 KG/M2 | OXYGEN SATURATION: 100 %

## 2023-05-23 DIAGNOSIS — R74.01 TRANSAMINITIS: ICD-10-CM

## 2023-05-23 DIAGNOSIS — Z34.80 SUPERVISION OF OTHER NORMAL PREGNANCY, ANTEPARTUM: Primary | ICD-10-CM

## 2023-05-23 DIAGNOSIS — N92.6 MISSED MENSES: ICD-10-CM

## 2023-05-23 LAB
ALBUMIN SERPL-MCNC: 3.3 G/DL (ref 3.4–5)
ALP SERPL-CCNC: 27 U/L (ref 40–150)
ALT SERPL W P-5'-P-CCNC: 14 U/L (ref 0–50)
ANION GAP SERPL CALCULATED.3IONS-SCNC: 6 MMOL/L (ref 3–14)
AST SERPL W P-5'-P-CCNC: 20 U/L (ref 0–45)
B-HCG SERPL-ACNC: ABNORMAL IU/L (ref 0–5)
BILIRUB SERPL-MCNC: 0.8 MG/DL (ref 0.2–1.3)
BUN SERPL-MCNC: 7 MG/DL (ref 7–30)
CALCIUM SERPL-MCNC: 8.6 MG/DL (ref 8.5–10.1)
CHLORIDE BLD-SCNC: 109 MMOL/L (ref 94–109)
CO2 SERPL-SCNC: 24 MMOL/L (ref 20–32)
CREAT SERPL-MCNC: 0.58 MG/DL (ref 0.52–1.04)
ERYTHROCYTE [DISTWIDTH] IN BLOOD BY AUTOMATED COUNT: 13.6 % (ref 10–15)
GFR SERPL CREATININE-BSD FRML MDRD: >90 ML/MIN/1.73M2
GLUCOSE BLD-MCNC: 52 MG/DL (ref 70–99)
HCT VFR BLD AUTO: 33 % (ref 35–47)
HGB BLD-MCNC: 11.2 G/DL (ref 11.7–15.7)
MCH RBC QN AUTO: 31.1 PG (ref 26.5–33)
MCHC RBC AUTO-ENTMCNC: 33.9 G/DL (ref 31.5–36.5)
MCV RBC AUTO: 92 FL (ref 78–100)
PLATELET # BLD AUTO: 201 10E3/UL (ref 150–450)
POTASSIUM BLD-SCNC: 3.3 MMOL/L (ref 3.4–5.3)
PROT SERPL-MCNC: 6.9 G/DL (ref 6.8–8.8)
RBC # BLD AUTO: 3.6 10E6/UL (ref 3.8–5.2)
SODIUM SERPL-SCNC: 139 MMOL/L (ref 133–144)
WBC # BLD AUTO: 2.6 10E3/UL (ref 4–11)

## 2023-05-23 PROCEDURE — 86780 TREPONEMA PALLIDUM: CPT | Performed by: OBSTETRICS & GYNECOLOGY

## 2023-05-23 PROCEDURE — 87624 HPV HI-RISK TYP POOLED RSLT: CPT | Performed by: OBSTETRICS & GYNECOLOGY

## 2023-05-23 PROCEDURE — 86850 RBC ANTIBODY SCREEN: CPT | Performed by: OBSTETRICS & GYNECOLOGY

## 2023-05-23 PROCEDURE — 87491 CHLMYD TRACH DNA AMP PROBE: CPT | Performed by: OBSTETRICS & GYNECOLOGY

## 2023-05-23 PROCEDURE — 86762 RUBELLA ANTIBODY: CPT | Performed by: OBSTETRICS & GYNECOLOGY

## 2023-05-23 PROCEDURE — 85027 COMPLETE CBC AUTOMATED: CPT | Performed by: OBSTETRICS & GYNECOLOGY

## 2023-05-23 PROCEDURE — 87389 HIV-1 AG W/HIV-1&-2 AB AG IA: CPT | Performed by: OBSTETRICS & GYNECOLOGY

## 2023-05-23 PROCEDURE — 80053 COMPREHEN METABOLIC PANEL: CPT | Performed by: OBSTETRICS & GYNECOLOGY

## 2023-05-23 PROCEDURE — G0145 SCR C/V CYTO,THINLAYER,RESCR: HCPCS | Performed by: OBSTETRICS & GYNECOLOGY

## 2023-05-23 PROCEDURE — 86900 BLOOD TYPING SEROLOGIC ABO: CPT | Performed by: OBSTETRICS & GYNECOLOGY

## 2023-05-23 PROCEDURE — 36415 COLL VENOUS BLD VENIPUNCTURE: CPT | Performed by: OBSTETRICS & GYNECOLOGY

## 2023-05-23 PROCEDURE — 86901 BLOOD TYPING SEROLOGIC RH(D): CPT | Performed by: OBSTETRICS & GYNECOLOGY

## 2023-05-23 PROCEDURE — 86803 HEPATITIS C AB TEST: CPT | Performed by: OBSTETRICS & GYNECOLOGY

## 2023-05-23 PROCEDURE — 87591 N.GONORRHOEAE DNA AMP PROB: CPT | Performed by: OBSTETRICS & GYNECOLOGY

## 2023-05-23 PROCEDURE — 84702 CHORIONIC GONADOTROPIN TEST: CPT | Performed by: OBSTETRICS & GYNECOLOGY

## 2023-05-23 PROCEDURE — 99213 OFFICE O/P EST LOW 20 MIN: CPT | Performed by: OBSTETRICS & GYNECOLOGY

## 2023-05-23 ASSESSMENT — PATIENT HEALTH QUESTIONNAIRE - PHQ9: SUM OF ALL RESPONSES TO PHQ QUESTIONS 1-9: 0

## 2023-05-23 NOTE — PATIENT INSTRUCTIONS
To Schedule an Appointment 24/7  Call: [phone]    If you have any questions regarding your visit, Please contact your care team.  [facility]: [phone]  [facility] HOURS TELEPHONE NUMBER   Cephas Agbeh, M.D.      Dimas Quesada-Surgery Scheduler  Charity-Surgery Scheduler         Monday - [place]:    8:00 am-4:45 pm  Tuesday - [place]:   8:00 am-4:45 pm  Friday-[place]:       8:00 am-4:45 pm  Typical Surgery Day:  Wednesday [facility]   [address]   [place] [address] [phone]   Fax [phone]    Imaging Scheduling all locations  [phone]      [facility]   [address] [place] [address] Dr.   [place], [place] [address] [phone]    [facility]  [address] [facility] [address]  [phone]  Fax [phone]     Urgent Care locations:  [facility] Monday-Friday                               10 am - 8 pm  Saturday and Sunday                      9 am - 5 pm  Monday-Friday                              10 am- 8 pm  Saturday and Sunday                      9 am - 5 pm    [phone]   **Surgeries** Our Surgery Schedulers will contact you to schedule. If you do not receive a call within 3 business days, please call [phone].  If you need a medication refill, please contact your pharmacy. Please allow 3 business days for your refill to be completed.  As always, Thank you for trusting us with your healthcare needs!  see additional instructions from your care team below

## 2023-05-23 NOTE — PROGRESS NOTES
Robert is a 33 year old  @  11w4d weeks here for new ob visit.    Early OB ultrasound     Comparisons: None.     HISTORY:    Check dates.     FINDINGS:    There is a gestational sac within the uterus with a yolk  sac and fetal pole. The crown-rump length measures 40 mm corresponding  to a gestational age of 11 weeks 3 days. Corresponding EDC is  2023. There is cardiac activity with a heart rate of 165 beats  per minute.     The right and left ovaries measure 4.0 x 2.7 x 2.7 cm and 2.7 x 2.3 x  1.8 cm, respectively. Both ovaries are normal in appearance. There is  a corpus luteum on the right.                                                                      IMPRESSION: Single living intrauterine embryo with an ultrasound  gestational age of 11 weeks 0 days corresponding to an ultrasound EDC  of 2023.     ANISH KABA MD   See Ob questionnaire for pertinent components of HPI.  Current Issues include: none    OBhx:   OB History    Para Term  AB Living   4 3 3 0 0 3   SAB IAB Ectopic Multiple Live Births   0 0 0 0 3      # Outcome Date GA Lbr Nabeel/2nd Weight Sex Delivery Anes PTL Lv   4 Current            3 Term 19 38w3d  3.147 kg (6 lb 15 oz) M  None N HENRY      Complications: Precipitous delivery   2 Term 18 38w4d  2.863 kg (6 lb 5 oz) F   N HENRY      Apgar1: 8  Apgar5: 9   1 Term 14   3.8 kg (8 lb 6 oz) M Vag-Spont   HENRY       Gyne  Past Surgical History:   Procedure Laterality Date     laproscopy      infertility     No past medical history on file.  Past Surgical History:   Procedure Laterality Date     laproscopy  2013    infertility     Patient Active Problem List    Diagnosis Date Noted     Encounter for surveillance of injectable contraceptive 2022     Priority: Medium      No Known Allergies  Current Outpatient Medications   Medication Sig Dispense Refill     fluocinonide (LIDEX) 0.05 % external cream Apply twice daily for up to to  one week for rash on the neck 30 g 11     pimecrolimus (ELIDEL) 1 % external cream Apply twice daily as needed for rash on the neck 60 g 11     Prenatal Vit-Fe Fumarate-FA (PRENATAL MULTIVITAMIN W/IRON) 27-0.8 MG tablet Take 1 tablet by mouth daily 90 tablet 3     tacrolimus (PROTOPIC) 0.1 % external ointment Apply twice daily as needed for rash on the trunk, extremities, neck, and ears. 60 g 11     tretinoin (RETIN-A) 0.025 % external cream Apple a thin layer to bilateral cheeks at nighttime before bed 45 g 11     triamcinolone (KENALOG) 0.025 % cream Apply twice daily as needed for rash on trunk, extremities, neck, ears for up to two weeks at a time 80 g 11     cyclobenzaprine (FLEXERIL) 5 MG tablet Take 1 tablet (5 mg) by mouth nightly as needed for muscle spasms (Patient not taking: Reported on 2/7/2023) 30 tablet 5     folic acid (FOLVITE) 1 MG tablet Take 1 tablet (1 mg) by mouth daily (Patient not taking: Reported on 5/8/2023) 90 tablet 3     ibuprofen (ADVIL/MOTRIN) 600 MG tablet Take 1 tablet (600 mg) by mouth every 6 hours as needed for moderate pain (Patient not taking: Reported on 5/8/2023) 60 tablet 0       Past Medical History of Father of Baby:   No significant medical history    Physical Exam: /73 (BP Location: Right arm, Patient Position: Sitting, Cuff Size: Adult Regular)   Pulse 91   Wt 57.1 kg (125 lb 12.8 oz)   LMP 01/08/2023   SpO2 100%   BMI 21.37 kg/m    General: Well developed, well nourished female  Skin: Normal  HEENT: Normal  Neck: Supple,no adenopathy,thyroid normal  Chest: Clear  Heart: Regular rate, rhythm,No murmur, rub, gallop  Breasts: No masses, skin, nipple or axillary changes and Not examined   Abdomen: Benign,Soft, flat, non-tender,No masses, organomegaly,No inguinal nodes,Bowel sounds normoactive   Extremities: Normal  Neurological: Normal   Perineum: Intact   Vulva: Normal  Vagina: Normal mucosa, no discharge  Cervix: Parous, closed, mobile, no discharge  Uterus: 11  weeks, Normal shape, position and consistency   Adnexa: Normal  Rectum: deferred, Normal without lesion or mass   Bony Pelvis: Adequate   NURSE FOR EXAM.    A/P 33 year old  at  11w4d weeks    ICD-10-CM    1. Supervision of other normal pregnancy, antepartum  Z34.80 ABO/Rh type and screen     Rubella Antibody IgG     CBC with platelets     HIV Antigen Antibody Combo     Treponema Abs w Reflex to RPR and Titer     Pap imaged thin layer screen with HPV - recommended age 30 - 65 years (select HPV order below)     Hepatitis C Screen Reflex to HCV RNA Quant and Genotype     Chlamydia trachomatis PCR     Neisseria gonorrhoeae PCR     Rubella Antibody IgG     CBC with platelets     HIV Antigen Antibody Combo     Treponema Abs w Reflex to RPR and Titer     Hepatitis C Screen Reflex to HCV RNA Quant and Genotype      2. Transaminitis  R74.01 Comprehensive metabolic panel (BMP + Alb, Alk Phos, ALT, AST, Total. Bili, TP)      3. Missed menses  N92.6 hCG Quantitative Pregnancy          - Discussed physician coverage, tertiary support, diet, exercise, weight gain, schedule of visits, routine and indicated ultrasounds, and childbirth education.    - Options for  testing for chromosomal and birth defects were discussed with the patient.  Diagnostic tests include CVS and Amniocentesis.  We discussed that these tests are definitive but invasive and do carry a risk of fetal loss.    Screening tests include nuchal translucency/blood marker testing in the first trimester and quad screening in the second trimester.  We discussed that these are screening tests and not diagnostic tests and that false positives and negatives are a distinct possibility.      return to clinic in 4 weeks.  CEPHAS AGBEH, MD.

## 2023-05-24 LAB
C TRACH DNA SPEC QL NAA+PROBE: NEGATIVE
HCV AB SERPL QL IA: NONREACTIVE
HIV 1+2 AB+HIV1 P24 AG SERPL QL IA: NONREACTIVE
N GONORRHOEA DNA SPEC QL NAA+PROBE: NEGATIVE
RUBV IGG SERPL QL IA: 23.5 INDEX
RUBV IGG SERPL QL IA: POSITIVE
T PALLIDUM AB SER QL: NONREACTIVE

## 2023-05-26 LAB
BKR LAB AP GYN ADEQUACY: NORMAL
BKR LAB AP GYN INTERPRETATION: NORMAL
BKR LAB AP HPV REFLEX: NORMAL
BKR LAB AP LMP: NORMAL
BKR LAB AP PREVIOUS ABNORMAL: NORMAL
PATH REPORT.COMMENTS IMP SPEC: NORMAL
PATH REPORT.COMMENTS IMP SPEC: NORMAL
PATH REPORT.RELEVANT HX SPEC: NORMAL

## 2023-05-27 NOTE — RESULT ENCOUNTER NOTE
Last seen by Dr.Agbeh for prenatal visit. Labs show low potassium levels, marginally low glucose .I would recommend a follow up in the clinic for a recheck in 1-2 weeks

## 2023-05-30 LAB
HUMAN PAPILLOMA VIRUS 16 DNA: NEGATIVE
HUMAN PAPILLOMA VIRUS 18 DNA: NEGATIVE
HUMAN PAPILLOMA VIRUS FINAL DIAGNOSIS: NORMAL
HUMAN PAPILLOMA VIRUS OTHER HR: NEGATIVE

## 2023-06-20 ENCOUNTER — PRENATAL OFFICE VISIT (OUTPATIENT)
Dept: OBGYN | Facility: CLINIC | Age: 33
End: 2023-06-20
Payer: COMMERCIAL

## 2023-06-20 VITALS
BODY MASS INDEX: 21.1 KG/M2 | HEART RATE: 87 BPM | DIASTOLIC BLOOD PRESSURE: 68 MMHG | SYSTOLIC BLOOD PRESSURE: 109 MMHG | WEIGHT: 124.2 LBS | OXYGEN SATURATION: 100 %

## 2023-06-20 DIAGNOSIS — Z34.80 SUPERVISION OF OTHER NORMAL PREGNANCY, ANTEPARTUM: Primary | ICD-10-CM

## 2023-06-20 DIAGNOSIS — O21.0 HYPEREMESIS GRAVIDARUM: ICD-10-CM

## 2023-06-20 PROCEDURE — 99207 PR PRENATAL VISIT: CPT | Performed by: OBSTETRICS & GYNECOLOGY

## 2023-06-20 RX ORDER — ONDANSETRON 8 MG/1
8 TABLET, ORALLY DISINTEGRATING ORAL EVERY 8 HOURS PRN
Qty: 30 TABLET | Refills: 1 | Status: SHIPPED | OUTPATIENT
Start: 2023-06-20

## 2023-06-20 NOTE — PATIENT INSTRUCTIONS
To Schedule an Appointment 24/7  Call: [phone]    If you have any questions regarding your visit, Please contact your care team.  [facility]: [phone]  [facility] HOURS TELEPHONE NUMBER   Cephas Agbeh, M.D.      Dimas Quesada-Surgery Scheduler  Charity-Surgery Scheduler         Monday - [place]:    8:00 am-4:45 pm  Tuesday - [place]:   8:00 am-4:45 pm  Friday-[place]:       8:00 am-4:45 pm  Typical Surgery Day:  Wednesday [facility]   [address]   [place] [address] [phone]   Fax [phone]    Imaging Scheduling all locations  [phone]      [facility] Labor and Delivery   [address] [place] [address] Dr.   [place], [place] [address] [phone]    [facility]  [address] [address]  [phone]  Fax [phone]     Urgent Care locations:  [facility] Monday-Friday                               10 am - 8 pm  Saturday and Sunday                      9 am - 5 pm  Monday-Friday                              10 am- 8 pm  Saturday and Sunday                      9 am - 5 pm    [phone]   **Surgeries** Our Surgery Schedulers will contact you to schedule. If you do not receive a call within 3 business days, please call [phone].  If you need a medication refill, please contact your pharmacy. Please allow 3 business days for your refill to be completed.  As always, Thank you for trusting us with your healthcare needs!  see additional instructions from your care team below

## 2023-06-20 NOTE — PROGRESS NOTES
15w4d Still having  Nausea . Discussed genetic screening.Plan for 20wk US.  return to clinic in wwks.    ICD-10-CM    1. Supervision of other normal pregnancy, antepartum  Z34.80 US OB > 14 Weeks     ondansetron (ZOFRAN ODT) 8 MG ODT tab      2. Hyperemesis gravidarum  O21.0 US OB > 14 Weeks     ondansetron (ZOFRAN ODT) 8 MG ODT tab        CEPHAS AGBEH, MD.

## 2023-07-18 ENCOUNTER — ANCILLARY PROCEDURE (OUTPATIENT)
Dept: ULTRASOUND IMAGING | Facility: CLINIC | Age: 33
End: 2023-07-18
Attending: OBSTETRICS & GYNECOLOGY
Payer: COMMERCIAL

## 2023-07-18 ENCOUNTER — PRENATAL OFFICE VISIT (OUTPATIENT)
Dept: OBGYN | Facility: CLINIC | Age: 33
End: 2023-07-18
Payer: COMMERCIAL

## 2023-07-18 VITALS
DIASTOLIC BLOOD PRESSURE: 70 MMHG | BODY MASS INDEX: 21.59 KG/M2 | SYSTOLIC BLOOD PRESSURE: 109 MMHG | OXYGEN SATURATION: 100 % | HEART RATE: 78 BPM | WEIGHT: 127.1 LBS

## 2023-07-18 DIAGNOSIS — Z34.80 SUPERVISION OF OTHER NORMAL PREGNANCY, ANTEPARTUM: ICD-10-CM

## 2023-07-18 DIAGNOSIS — O21.0 HYPEREMESIS GRAVIDARUM: ICD-10-CM

## 2023-07-18 DIAGNOSIS — Z34.80 SUPERVISION OF OTHER NORMAL PREGNANCY, ANTEPARTUM: Primary | ICD-10-CM

## 2023-07-18 PROCEDURE — 99207 PR PRENATAL VISIT: CPT | Performed by: OBSTETRICS & GYNECOLOGY

## 2023-07-18 PROCEDURE — 76805 OB US >/= 14 WKS SNGL FETUS: CPT

## 2023-07-18 NOTE — PROGRESS NOTES
19w4d. Doing well without issues/concerns. Ultrasound results are pending Routine anticipatory guidance.  return to clinic in 4 weeks.    ICD-10-CM    1. Supervision of other normal pregnancy, antepartum  Z34.80         CEPHAS AGBEH, MD.

## 2023-07-18 NOTE — PATIENT INSTRUCTIONS
To Schedule an Appointment 24/7  Call: [phone]    If you have any questions regarding your visit, Please contact your care team.  [facility]: [phone]  [facility] HOURS TELEPHONE NUMBER   Cephas Agbeh, M.D.      Dimas Quesada-Surgery Scheduler  Charity-Surgery Scheduler         Monday - [place]:    8:00 am-4:45 pm  Tuesday - [place]:   8:00 am-4:45 pm  Friday-[place]:       8:00 am-4:45 pm  Typical Surgery Day:  Wednesday [facility]   [address]   [place] [address] [phone]   Fax [phone]    Imaging Scheduling all locations  [phone]      [facility] Labor and Delivery   [address] [place] [address] Dr.   [place], [place] [address] [phone]    [facility]  [address] [address]  [phone]  Fax [phone]     Urgent Care locations:  [facility] Monday-Friday                               10 am - 8 pm  Saturday and Sunday                      9 am - 5 pm  Monday-Friday                              10 am- 8 pm  Saturday and Sunday                      9 am - 5 pm    [phone]   **Surgeries** Our Surgery Schedulers will contact you to schedule. If you do not receive a call within 3 business days, please call [phone].  If you need a medication refill, please contact your pharmacy. Please allow 3 business days for your refill to be completed.  As always, Thank you for trusting us with your healthcare needs!  see additional instructions from your care team below

## 2023-08-21 LAB
ABO/RH(D): NORMAL
SPECIMEN EXPIRATION DATE: NORMAL

## 2023-08-22 ENCOUNTER — PRENATAL OFFICE VISIT (OUTPATIENT)
Dept: OBGYN | Facility: CLINIC | Age: 33
End: 2023-08-22
Payer: COMMERCIAL

## 2023-08-22 VITALS
DIASTOLIC BLOOD PRESSURE: 73 MMHG | SYSTOLIC BLOOD PRESSURE: 111 MMHG | BODY MASS INDEX: 22.51 KG/M2 | WEIGHT: 132.5 LBS | HEART RATE: 108 BPM

## 2023-08-22 DIAGNOSIS — Z34.80 SUPERVISION OF OTHER NORMAL PREGNANCY, ANTEPARTUM: Primary | ICD-10-CM

## 2023-08-22 LAB
GLUCOSE 1H P 50 G GLC PO SERPL-MCNC: 61 MG/DL (ref 70–129)
HGB BLD-MCNC: 10.6 G/DL (ref 11.7–15.7)

## 2023-08-22 PROCEDURE — 82950 GLUCOSE TEST: CPT | Performed by: OBSTETRICS & GYNECOLOGY

## 2023-08-22 PROCEDURE — 86900 BLOOD TYPING SEROLOGIC ABO: CPT | Performed by: OBSTETRICS & GYNECOLOGY

## 2023-08-22 PROCEDURE — 36415 COLL VENOUS BLD VENIPUNCTURE: CPT | Performed by: OBSTETRICS & GYNECOLOGY

## 2023-08-22 PROCEDURE — 99207 PR PRENATAL VISIT: CPT | Performed by: OBSTETRICS & GYNECOLOGY

## 2023-08-22 PROCEDURE — 86901 BLOOD TYPING SEROLOGIC RH(D): CPT | Performed by: OBSTETRICS & GYNECOLOGY

## 2023-08-22 PROCEDURE — 85018 HEMOGLOBIN: CPT | Performed by: OBSTETRICS & GYNECOLOGY

## 2023-08-22 NOTE — PROGRESS NOTES
"24w4d  Doing well without issues/concerns.  Routine anticipatory guidance.  F/U US is ordered.  RTC 4wk.  Glucola given. Plan for  GCT, Hgb.   Vital signs:      BP: 111/73 Pulse: 108             Weight: 60.1 kg (132 lb 8 oz)  Estimated body mass index is 22.51 kg/m  as calculated from the following:    Height as of 9/9/22: 1.634 m (5' 4.33\").    Weight as of this encounter: 60.1 kg (132 lb 8 oz).         ICD-10-CM    1. Supervision of other normal pregnancy, antepartum  Z34.80 Glucose tolerance gest screen 1 hour     ABO and Rh     Hemoglobin     US OB Follow Up >14 Weeks        CEPHAS AGBEH, MD.    "

## 2023-08-22 NOTE — PATIENT INSTRUCTIONS
To Schedule an Appointment 24/7  Call: [phone]    If you have any questions regarding your visit, Please contact your care team.  [facility]: [phone]  [facility] HOURS TELEPHONE NUMBER   Cephas Agbeh, M.D.      Dimas Quesada-Surgery Scheduler  Charity-Surgery Scheduler         Monday - [place]:    8:00 am-4:45 pm  Tuesday - [place]:   8:00 am-4:45 pm  Friday-[place]:       8:00 am-4:45 pm  Typical Surgery Day:  Wednesday [facility]   [address]   [place] [address] [phone]   Fax [phone]    Imaging Scheduling all locations  [phone]      [facility] Labor and Delivery   [address] [place] [address] Dr.   [place], [place] [address] [phone]    [facility]  [address] [facility] [address]  [phone]  Fax [phone]     Urgent Care locations:  [facility] Monday-Friday                               10 am - 8 pm  Saturday and Sunday                      9 am - 5 pm  Monday-Friday                              10 am- 8 pm  Saturday and Sunday                      9 am - 5 pm    [phone]   **Surgeries** Our Surgery Schedulers will contact you to schedule. If you do not receive a call within 3 business days, please call [phone].  If you need a medication refill, please contact your pharmacy. Please allow 3 business days for your refill to be completed.  As always, Thank you for trusting us with your healthcare needs!  see additional instructions from your care team below

## 2023-08-23 ENCOUNTER — TELEPHONE (OUTPATIENT)
Dept: OBGYN | Facility: CLINIC | Age: 33
End: 2023-08-23
Payer: COMMERCIAL

## 2023-08-23 DIAGNOSIS — Z34.80 SUPERVISION OF OTHER NORMAL PREGNANCY, ANTEPARTUM: Primary | ICD-10-CM

## 2023-08-23 DIAGNOSIS — D50.9 IRON DEFICIENCY ANEMIA, UNSPECIFIED IRON DEFICIENCY ANEMIA TYPE: ICD-10-CM

## 2023-08-23 NOTE — TELEPHONE ENCOUNTER
----- Message from Cephas Mawuena Agbeh, MD sent at 8/22/2023  4:40 PM CDT -----  Ms. Anand,    All of your labs were normal for you.EXCEPT  Your hemoglobin was normal.  There is no evidence of anemia.   Start Iron Supplement 325mg twice daily.    Please contact the clinic if you have additional questions.  Thank you.    Sincerely,    Cephas Mawuena Agbeh, MD

## 2023-08-24 RX ORDER — PNV NO.95/FERROUS FUM/FOLIC AC 28MG-0.8MG
1 TABLET ORAL DAILY
Qty: 120 TABLET | Refills: 3 | Status: ON HOLD | OUTPATIENT
Start: 2023-08-24 | End: 2023-12-01

## 2023-08-24 NOTE — TELEPHONE ENCOUNTER
Agbeh, Cephas Mawuena, MD  YouJust now (10:51 AM)     [place]    Your hemoglobin was is low.  There is consistent with  anemia.   Start Iron Supplement 325mg twice daily.  I have sent PRESCRIPTION to your pharmacy     Please contact the clinic if you have additional questions.  Thank you.     Sincerely,     Cephas Mawuena Agbeh, MD     RN called pt w/ Libyan  and relayed above results.      Bhavya Ballesteros RN on 8/24/2023 at 10:57 AM

## 2023-09-01 ENCOUNTER — MYC MEDICAL ADVICE (OUTPATIENT)
Dept: OBGYN | Facility: CLINIC | Age: 33
End: 2023-09-01
Payer: COMMERCIAL

## 2023-09-01 DIAGNOSIS — Z34.80 SUPERVISION OF OTHER NORMAL PREGNANCY, ANTEPARTUM: Primary | ICD-10-CM

## 2023-09-01 NOTE — TELEPHONE ENCOUNTER
Pt last seen 8/22/2023 for prenatal, currently 26w0d    Last US on 7/18/2023 was normal but unable to see all views- advised to repeat US.    Pt's next appt is on 9/26- pt asking for f/up US order be extended so she can schedule for same day as OB appt- RN routing to provider to advise if this is OK for timing.    Bhavya Ballesteros RN on 9/1/2023 at 12:46 PM

## 2023-09-06 NOTE — CONFIDENTIAL NOTE
Not sen yet  [facility] Dermatology Note  Encounter Date: Sep 14, 2023  {kkvisit3:836921}    Dermatology Problem List:  1. 1. Dermatitis, bilateral neck.   - Current tx: lidex 0.05% cream BID x 1 week, then elidel cream thereafter  - Prior tx: triamcinolone 0.025% cream BID x 2 weeks, then Protopic ointment BID    ____________________________________________    Assessment & Plan:    # {Diagnosesderm:060175}.   {kkplans:756326}   - ***     # Acne vulgaris.   {kkplans:485019}   - ***     Procedures Performed:   {kkprocedurenotes:623969}  {kkprocedurenotes:348571}    Follow-up: {kkfollowup:609547}    Staff and Scribe:     I, Cynthia Diaz, am serving as a scribe to document services personally performed by Dr. Carolyn Gray, based on data collection and the provider's statements to me.       ***  ____________________________________________    CC: No chief complaint on file.    HPI:  Ms. Robert Anand is a(n) 33 year old female who presents today as a new patient for     Patient is otherwise feeling well, without additional skin concerns.    Labs Reviewed:  ***N/A    Physical Exam:  Vitals: LMP 01/08/2023   SKIN: {kkSkinExam:717426}  - ***  - {Skin Exam Derm:285502}.   - {Skin Exam Derm:147902}.   - {Skin Exam Derm:483638}.   - No other lesions of concern on areas examined.     Medications:  Current Outpatient Medications   Medication    cyclobenzaprine (FLEXERIL) 5 MG tablet    Ferrous Sulfate (IRON) 325 (65 Fe) MG tablet    fluocinonide (LIDEX) 0.05 % external cream    folic acid (FOLVITE) 1 MG tablet    ibuprofen (ADVIL/MOTRIN) 600 MG tablet    ondansetron (ZOFRAN ODT) 8 MG ODT tab    pimecrolimus (ELIDEL) 1 % external cream    Prenatal Vit-Fe Fumarate-FA (PRENATAL MULTIVITAMIN W/IRON) 27-0.8 MG tablet    tacrolimus (PROTOPIC) 0.1 % external ointment    tretinoin (RETIN-A) 0.025 % external cream    triamcinolone (KENALOG) 0.025 % cream     No current facility-administered medications for this  visit.      Past Medical History:   Patient Active Problem List   Diagnosis    Encounter for surveillance of injectable contraceptive     No past medical history on file.     CC No referring provider defined for this encounter. on close of this encounter.

## 2023-09-14 ENCOUNTER — OFFICE VISIT (OUTPATIENT)
Dept: DERMATOLOGY | Facility: CLINIC | Age: 33
End: 2023-09-14
Payer: COMMERCIAL

## 2023-09-14 DIAGNOSIS — L30.9 DERMATITIS: Primary | ICD-10-CM

## 2023-09-14 PROCEDURE — 99214 OFFICE O/P EST MOD 30 MIN: CPT | Performed by: DERMATOLOGY

## 2023-09-14 RX ORDER — HYDROCORTISONE 2.5 %
CREAM (GRAM) TOPICAL
Qty: 60 G | Refills: 1 | Status: SHIPPED | OUTPATIENT
Start: 2023-09-14

## 2023-09-14 NOTE — PATIENT INSTRUCTIONS
Stop elidel(pimecrolimus and tretinoin if using)    Switch to hydrocortisone 2.5% cream : Apply twice daily for 1 week, then 3 times weekly for 1 week, then repeat      For the body, start vanicream twice daily from the neck down.        Patch Testing Referral:    Allergy/patch testing with Dr. Juve Ortega.      There is currently a 2-3 month wait for the initial consultation. If you qualify for patch testing then you will require a series of 3 appointments to complete the patch testing process.    Dermato-allergology clinics on the 4th floor:  [facility], [address] [place] [address], [address], [place], [place], [address]  Phone  Allergy is [phone]

## 2023-09-14 NOTE — PROGRESS NOTES
[facility] Dermatology Note  Encounter Date: Sep 14, 2023  Office Visit     Dermatology Problem List:  1. 1. Dermatitis, bilateral neck.   - Current tx: lidex 0.05% cream BID x 1 week, then elidel cream thereafter  - Prior tx: triamcinolone 0.025% cream BID x 2 weeks, then Protopic ointment BID    ____________________________________________    Assessment & Plan:    # Dermatitis, neck hands, itchy. Pregnant she reports for 6 months  Stop elidel(pimecrolimus and tretinoin if using)    Switch to hydrocortisone 2.5% cream : Apply twice daily for 1 week, then 3 times weekly for 1 week, then repeat, reviewed risks with pregnancy  -reviewed skin thinning    For the body, start vanicream twice daily from the neck down.  -referral Dr. Ortega  -Future-dupixent and return to tacrolimus inhibitor. COnsider new topical eucrisa also        # Acne vulgaris.   #melasma  -pt stopped tretinoin  -sunscreen    Procedures Performed:   None     Follow-up: with Dr. Ortega patch testing and then also return to me in person after baby born    Staff and Scribe:     I, Cynthia Diaz, am serving as a scribe to document services personally performed by Dr. Carolyn Gray, based on data collection and the provider's statements to me.       Provider Disclosure:   The documentation recorded by the scribe accurately reflects the services I personally performed and the decisions made by me.    Carolyn Gray MD    Department of Dermatology  [facility]: Phone: [phone], Fax:[phone]  [facility]: Phone: [phone], Fax: [phone]   ____________________________________________    CC: No chief complaint on file.  Chief Complaint   Patient presents with    Derm Problem     The topicals she has been prescribed make her itchy so she no longer uses them  She feels her neck, chest, ears and back  have gotten darker and her skin is still itchy       Urdu interpretor present   HPI:  Ms. Robert Anand is a(n) 33 year old female who presents today as a return.     She is 6 months pregnant. She stopped all topicals. Is itchy and dark again.     Patient is otherwise feeling well, without additional skin concerns.    Labs Reviewed:  PIPER    Physical Exam:  Vitals: LMP 01/08/2023   SKIN:   Hyperpigmentation, faint face, faint scalp neck and cheeks and also between breasts,  normal abdomen   - No other lesions of concern on areas examined.     Medications:  Current Outpatient Medications   Medication    cyclobenzaprine (FLEXERIL) 5 MG tablet    Ferrous Sulfate (IRON) 325 (65 Fe) MG tablet    fluocinonide (LIDEX) 0.05 % external cream    folic acid (FOLVITE) 1 MG tablet    ibuprofen (ADVIL/MOTRIN) 600 MG tablet    ondansetron (ZOFRAN ODT) 8 MG ODT tab    pimecrolimus (ELIDEL) 1 % external cream    Prenatal Vit-Fe Fumarate-FA (PRENATAL MULTIVITAMIN W/IRON) 27-0.8 MG tablet    tacrolimus (PROTOPIC) 0.1 % external ointment    tretinoin (RETIN-A) 0.025 % external cream    triamcinolone (KENALOG) 0.025 % cream     No current facility-administered medications for this visit.      Past Medical History:   Patient Active Problem List   Diagnosis    Encounter for surveillance of injectable contraceptive     No past medical history on file.     CC No referring provider defined for this encounter. on close of this encounter.

## 2023-09-14 NOTE — NURSING NOTE
Robert Anand's chief complaint for this visit includes:  Chief Complaint   Patient presents with    Derm Problem     The topicals she has been prescribed make her itchy so she no longer uses them   She feels her neck, chest, ears and back have gotten darker      PCP: Dayami Ramirez    Referring Provider:  No referring provider defined for this encounter.    [facility] 01/08/2023   Data Unavailable      No Known Allergies      Do you need any medication refills at today's visit? No

## 2023-09-14 NOTE — LETTER
9/14/2023         RE: Robert Anand  [address] [facility] [address]        Dear Colleague,    Thank you for referring your patient, Robert Anand, to the [facility]. Please see a copy of my visit note below.      [facility] Dermatology Note  Encounter Date: Sep 14, 2023  Office Visit     Dermatology Problem List:  1. 1. Dermatitis, bilateral neck.   - Current tx: lidex 0.05% cream BID x 1 week, then elidel cream thereafter  - Prior tx: triamcinolone 0.025% cream BID x 2 weeks, then Protopic ointment BID    ____________________________________________    Assessment & Plan:    # Dermatitis, neck hands, itchy. Pregnant she reports for 6 months  Stop elidel(pimecrolimus and tretinoin if using)    Switch to hydrocortisone 2.5% cream : Apply twice daily for 1 week, then 3 times weekly for 1 week, then repeat, reviewed risks with pregnancy  -reviewed skin thinning    For the body, start vanicream twice daily from the neck down.  -referral Dr. Ortega  -Future-dupixent and return to tacrolimus inhibitor. COnsider new topical eucrisa also        # Acne vulgaris.   #melasma  -pt stopped tretinoin  -sunscreen    Procedures Performed:   None     Follow-up: with Dr. Ortega patch testing and then also return to me in person after baby born    Staff and Scribe:     I, Cynthia Diaz, am serving as a scribe to document services personally performed by Dr. Carolyn Gray, based on data collection and the provider's statements to me.       Provider Disclosure:   The documentation recorded by the scribe accurately reflects the services I personally performed and the decisions made by me.    Carolyn Gray MD    Department of Dermatology  [facility]: Phone: [phone], Fax:[phone]  [facility]: Phone: [phone], Fax: [phone]    ____________________________________________    CC: No chief complaint on file.  Chief Complaint   Patient presents with     Derm Problem     The topicals she has been prescribed make her itchy so she no longer uses them  She feels her neck, chest, ears and back have gotten darker and her skin is still itchy       Northern Irish interpretor present   HPI:  Ms. Robert Anand is a(n) 33 year old female who presents today as a return.     She is 6 months pregnant. She stopped all topicals. Is itchy and dark again.     Patient is otherwise feeling well, without additional skin concerns.    Labs Reviewed:  NA    Physical Exam:  Vitals: LMP 01/08/2023   SKIN:   Hyperpigmentation, faint face, faint scalp neck and cheeks and also between breasts,  normal abdomen   - No other lesions of concern on areas examined.     Medications:  Current Outpatient Medications   Medication     cyclobenzaprine (FLEXERIL) 5 MG tablet     Ferrous Sulfate (IRON) 325 (65 Fe) MG tablet     fluocinonide (LIDEX) 0.05 % external cream     folic acid (FOLVITE) 1 MG tablet     ibuprofen (ADVIL/MOTRIN) 600 MG tablet     ondansetron (ZOFRAN ODT) 8 MG ODT tab     pimecrolimus (ELIDEL) 1 % external cream     Prenatal Vit-Fe Fumarate-FA (PRENATAL MULTIVITAMIN W/IRON) 27-0.8 MG tablet     tacrolimus (PROTOPIC) 0.1 % external ointment     tretinoin (RETIN-A) 0.025 % external cream     triamcinolone (KENALOG) 0.025 % cream     No current facility-administered medications for this visit.      Past Medical History:   Patient Active Problem List   Diagnosis     Encounter for surveillance of injectable contraceptive     No past medical history on file.     CC No referring provider defined for this encounter. on close of this encounter.      Again, thank you for allowing me to participate in the care of your patient.        Sincerely,        Carolyn Gray MD

## 2023-09-26 ENCOUNTER — PRENATAL OFFICE VISIT (OUTPATIENT)
Dept: OBGYN | Facility: CLINIC | Age: 33
End: 2023-09-26
Payer: COMMERCIAL

## 2023-09-26 VITALS
SYSTOLIC BLOOD PRESSURE: 110 MMHG | BODY MASS INDEX: 23.16 KG/M2 | WEIGHT: 136.3 LBS | HEART RATE: 95 BPM | DIASTOLIC BLOOD PRESSURE: 71 MMHG | OXYGEN SATURATION: 99 %

## 2023-09-26 DIAGNOSIS — R21 RASH: Primary | ICD-10-CM

## 2023-09-26 DIAGNOSIS — Z23 NEED FOR TDAP VACCINATION: ICD-10-CM

## 2023-09-26 DIAGNOSIS — Z34.80 SUPERVISION OF OTHER NORMAL PREGNANCY, ANTEPARTUM: ICD-10-CM

## 2023-09-26 DIAGNOSIS — D50.9 IRON DEFICIENCY ANEMIA, UNSPECIFIED IRON DEFICIENCY ANEMIA TYPE: ICD-10-CM

## 2023-09-26 PROCEDURE — 99213 OFFICE O/P EST LOW 20 MIN: CPT | Mod: 25 | Performed by: OBSTETRICS & GYNECOLOGY

## 2023-09-26 PROCEDURE — 90471 IMMUNIZATION ADMIN: CPT | Performed by: OBSTETRICS & GYNECOLOGY

## 2023-09-26 PROCEDURE — 99207 PR PRENATAL VISIT: CPT | Performed by: OBSTETRICS & GYNECOLOGY

## 2023-09-26 PROCEDURE — 90715 TDAP VACCINE 7 YRS/> IM: CPT | Performed by: OBSTETRICS & GYNECOLOGY

## 2023-09-26 RX ORDER — TRIAMCINOLONE ACETONIDE 0.25 MG/G
OINTMENT TOPICAL 2 TIMES DAILY
Qty: 15 G | Refills: 3 | Status: SHIPPED | OUTPATIENT
Start: 2023-09-26

## 2023-09-26 RX ORDER — FERROUS SULFATE 325(65) MG
325 TABLET ORAL
Qty: 60 TABLET | Refills: 3 | Status: SHIPPED | OUTPATIENT
Start: 2023-09-26

## 2023-09-26 NOTE — PROGRESS NOTES
"29w4d.  Tired.  No HA, visual changes, N/V etc. Skin rash under armpit.. RTC 2 wk/prn.  Vital signs:      BP: 110/71 Pulse: 95     SpO2: 99 %       Weight: 61.8 kg (136 lb 4.8 oz)  Estimated body mass index is 23.16 kg/m  as calculated from the following:    Height as of 9/9/22: 1.634 m (5' 4.33\").    Weight as of this encounter: 61.8 kg (136 lb 4.8 oz).         ICD-10-CM    1. Rash  R21 triamcinolone (KENALOG) 0.025 % external ointment      2. Supervision of other normal pregnancy, antepartum  Z34.80 ferrous sulfate (FEROSUL) 325 (65 Fe) MG tablet      3. Iron deficiency anemia, unspecified iron deficiency anemia type  D50.9 ferrous sulfate (FEROSUL) 325 (65 Fe) MG tablet        CEPHAS AGBEH, MD.    "

## 2023-09-26 NOTE — PROGRESS NOTES
Prior to immunization administration, verified patients identity using patient s name and date of birth. Please see Immunization Activity for additional information.     Screening Questionnaire for Adult Immunization    Are you sick today?   No   Do you have allergies to medications, food, a vaccine component or latex?   No   Have you ever had a serious reaction after receiving a vaccination?   No   Do you have a long-term health problem with heart, lung, kidney, or metabolic disease (e.g., diabetes), asthma, a blood disorder, no spleen, complement component deficiency, a cochlear implant, or a spinal fluid leak?  Are you on long-term aspirin therapy?   No   Do you have cancer, leukemia, HIV/AIDS, or any other immune system problem?   No   Do you have a parent, brother, or sister with an immune system problem?   No   In the past 3 months, have you taken medications that affect  your immune system, such as prednisone, other steroids, or anticancer drugs; drugs for the treatment of rheumatoid arthritis, Crohn s disease, or psoriasis; or have you had radiation treatments?   No   Have you had a seizure, or a brain or other nervous system problem?   No   During the past year, have you received a transfusion of blood or blood    products, or been given immune (gamma) globulin or antiviral drug?   No   For women: Are you pregnant or is there a chance you could become       pregnant during the next month?   Yes   Have you received any vaccinations in the past 4 weeks?   No     Immunization questionnaire was positive for at least one answer.  Notified Agbeh Tdap.      Patient instructed to remain in clinic for 15 minutes afterwards, and to report any adverse reactions.     Screening performed by Anna Marie Lund CMA on 9/26/2023 at 3:11 PM.

## 2023-09-26 NOTE — PATIENT INSTRUCTIONS
To Schedule an Appointment 24/7  Call: [phone]    If you have any questions regarding your visit, Please contact your care team.  [facility]: [phone]  [facility] HOURS TELEPHONE NUMBER   Cephas Agbeh, M.D.      Dimas Quesada-Surgery Scheduler  Charity-Surgery Scheduler         Monday - [place]:    8:00 am-4:45 pm  Tuesday - [place]:   8:00 am-4:45 pm  Friday-[place]:       8:00 am-4:45 pm  Typical Surgery Day:  Wednesday [facility]   [address]   [place] [address] [phone]   Fax [phone]    Imaging Scheduling all locations  [phone]      [facility] Labor and Delivery   [address] [place] [address] Dr.   [place], [place] [address] [phone]    [facility]  [address] [facility] [address]  [phone]  Fax [phone]     Urgent Care locations:  [facility] Monday-Friday                               10 am - 8 pm  Saturday and Sunday                      9 am - 5 pm  Monday-Friday                              10 am- 8 pm  Saturday and Sunday                      9 am - 5 pm    [phone]   **Surgeries** Our Surgery Schedulers will contact you to schedule. If you do not receive a call within 3 business days, please call [phone].  If you need a medication refill, please contact your pharmacy. Please allow 3 business days for your refill to be completed.  As always, Thank you for trusting us with your healthcare needs!  see additional instructions from your care team below

## 2023-10-10 ENCOUNTER — PRENATAL OFFICE VISIT (OUTPATIENT)
Dept: OBGYN | Facility: CLINIC | Age: 33
End: 2023-10-10
Payer: COMMERCIAL

## 2023-10-10 VITALS
HEART RATE: 100 BPM | DIASTOLIC BLOOD PRESSURE: 73 MMHG | BODY MASS INDEX: 23.65 KG/M2 | OXYGEN SATURATION: 100 % | WEIGHT: 139.2 LBS | SYSTOLIC BLOOD PRESSURE: 119 MMHG

## 2023-10-10 DIAGNOSIS — Z23 FLU VACCINE NEED: Primary | ICD-10-CM

## 2023-10-10 DIAGNOSIS — Z34.80 SUPERVISION OF OTHER NORMAL PREGNANCY, ANTEPARTUM: ICD-10-CM

## 2023-10-10 PROCEDURE — 99207 PR PRENATAL VISIT: CPT | Performed by: OBSTETRICS & GYNECOLOGY

## 2023-10-10 PROCEDURE — 90471 IMMUNIZATION ADMIN: CPT | Performed by: OBSTETRICS & GYNECOLOGY

## 2023-10-10 PROCEDURE — 90686 IIV4 VACC NO PRSV 0.5 ML IM: CPT | Performed by: OBSTETRICS & GYNECOLOGY

## 2023-10-10 NOTE — PROGRESS NOTES
"31w4d.  Tired.  No HA, visual changes, N/V etc. PNE classes planned/done. RTC 2 wk/prn.  Vital signs:      BP: 119/73 Pulse: 100     SpO2: 100 %       Weight: 63.1 kg (139 lb 3.2 oz)  Estimated body mass index is 23.65 kg/m  as calculated from the following:    Height as of 9/9/22: 1.634 m (5' 4.33\").    Weight as of this encounter: 63.1 kg (139 lb 3.2 oz).         ICD-10-CM    1. Flu vaccine need  Z23 INFLUENZA VACCINE IM > 6 MONTHS VALENT IIV4 (AFLURIA/FLUZONE)      2. Supervision of other normal pregnancy, antepartum  Z34.80         CEPHAS AGBEH, MD.    "

## 2023-10-24 ENCOUNTER — PRENATAL OFFICE VISIT (OUTPATIENT)
Dept: OBGYN | Facility: CLINIC | Age: 33
End: 2023-10-24
Payer: COMMERCIAL

## 2023-10-24 VITALS
SYSTOLIC BLOOD PRESSURE: 116 MMHG | WEIGHT: 141.1 LBS | DIASTOLIC BLOOD PRESSURE: 77 MMHG | HEART RATE: 99 BPM | BODY MASS INDEX: 23.97 KG/M2

## 2023-10-24 DIAGNOSIS — D50.9 IRON DEFICIENCY ANEMIA, UNSPECIFIED IRON DEFICIENCY ANEMIA TYPE: ICD-10-CM

## 2023-10-24 DIAGNOSIS — Z34.80 SUPERVISION OF OTHER NORMAL PREGNANCY, ANTEPARTUM: Primary | ICD-10-CM

## 2023-10-24 LAB
ERYTHROCYTE [DISTWIDTH] IN BLOOD BY AUTOMATED COUNT: 13.9 % (ref 10–15)
HCT VFR BLD AUTO: 35 % (ref 35–47)
HGB BLD-MCNC: 11.2 G/DL (ref 11.7–15.7)
MCH RBC QN AUTO: 30.4 PG (ref 26.5–33)
MCHC RBC AUTO-ENTMCNC: 32 G/DL (ref 31.5–36.5)
MCV RBC AUTO: 95 FL (ref 78–100)
PLATELET # BLD AUTO: 189 10E3/UL (ref 150–450)
RBC # BLD AUTO: 3.69 10E6/UL (ref 3.8–5.2)
WBC # BLD AUTO: 4.6 10E3/UL (ref 4–11)

## 2023-10-24 PROCEDURE — 99207 PR PRENATAL VISIT: CPT | Performed by: OBSTETRICS & GYNECOLOGY

## 2023-10-24 PROCEDURE — 85027 COMPLETE CBC AUTOMATED: CPT | Performed by: OBSTETRICS & GYNECOLOGY

## 2023-10-24 PROCEDURE — 36415 COLL VENOUS BLD VENIPUNCTURE: CPT | Performed by: OBSTETRICS & GYNECOLOGY

## 2023-10-24 NOTE — PROGRESS NOTES
"33w4d  Tired.  No HA, visual changes, N/V etc. PNE classes planned/done. RTC 2 wk/prn.Interprater visit.  Vital signs:      BP: 116/77 Pulse: 99             Weight: 64 kg (141 lb 1.6 oz)  Estimated body mass index is 23.97 kg/m  as calculated from the following:    Height as of 9/9/22: 1.634 m (5' 4.33\").    Weight as of this encounter: 64 kg (141 lb 1.6 oz).         ICD-10-CM    1. Supervision of other normal pregnancy, antepartum  Z34.80 CBC with platelets     CBC with platelets      2. Iron deficiency anemia, unspecified iron deficiency anemia type  D50.9 CBC with platelets     CBC with platelets        CEPHAS AGBEH, MD.    "

## 2023-11-07 ENCOUNTER — PRENATAL OFFICE VISIT (OUTPATIENT)
Dept: OBGYN | Facility: CLINIC | Age: 33
End: 2023-11-07
Payer: COMMERCIAL

## 2023-11-07 VITALS
BODY MASS INDEX: 24.94 KG/M2 | HEART RATE: 51 BPM | WEIGHT: 146.8 LBS | OXYGEN SATURATION: 100 % | DIASTOLIC BLOOD PRESSURE: 74 MMHG | SYSTOLIC BLOOD PRESSURE: 120 MMHG

## 2023-11-07 DIAGNOSIS — Z34.80 SUPERVISION OF OTHER NORMAL PREGNANCY, ANTEPARTUM: Primary | ICD-10-CM

## 2023-11-07 PROCEDURE — 99207 PR PRENATAL VISIT: CPT | Performed by: OBSTETRICS & GYNECOLOGY

## 2023-11-07 NOTE — PATIENT INSTRUCTIONS
To Schedule an Appointment 24/7  Call: [phone]    If you have any questions regarding your visit, Please contact your care team.  [facility]: [phone]  [facility] HOURS TELEPHONE NUMBER   Cephas Agbeh, M.D.      Melissa Quesada-Surgery Scheduler  Charity-Surgery Scheduler       Monday - [place]:    8:00 am-4:45 pm  Tuesday - [place]:   8:00 am-4:45 pm  Friday-[place]:       8:00 am-4:45 pm  Typical Surgery Day:  Wednesday [facility]   [address] [place] [address]   [phone]   Fax [phone]    Imaging Scheduling all locations  [phone]      [facility]   [address] [place] [address] [place] [address]   [phone]    [facility]  [address] [facility] [address]  [phone]  Fax [phone]   Urgent Care locations:  [facility] Monday-Friday                               10 am - 8 pm  Saturday and Sunday                      9 am - 5 pm  Monday-Friday                              10 am- 8 pm  Saturday and Sunday                      9 am - 5 pm    [phone]   **Surgeries** Our Surgery Schedulers will contact you to schedule. If you do not receive a call within 3 business days, please call [phone].  If you need a medication refill, please contact your pharmacy. Please allow 3 business days for your refill to be completed.  As always, Thank you for trusting us with your healthcare needs!  see additional instructions from your care team below

## 2023-11-07 NOTE — PROGRESS NOTES
"35w4d  No HA, visual changes, N/V etc.  Labor plan and warning s/s discussed. Routine AG. See flowsheet. RTC 1 wk/prn.    Vital signs:      BP: 120/74 Pulse: 51     SpO2: 100 %       Weight: 66.6 kg (146 lb 12.8 oz)  Estimated body mass index is 24.94 kg/m  as calculated from the following:    Height as of 9/9/22: 1.634 m (5' 4.33\").    Weight as of this encounter: 66.6 kg (146 lb 12.8 oz).         ICD-10-CM    1. Supervision of other normal pregnancy, antepartum  Z34.80 US OB Follow Up >14 Weeks        CEPHAS AGBEH, MD.    "

## 2023-11-21 ENCOUNTER — PRENATAL OFFICE VISIT (OUTPATIENT)
Dept: OBGYN | Facility: CLINIC | Age: 33
End: 2023-11-21
Payer: COMMERCIAL

## 2023-11-21 ENCOUNTER — ANCILLARY PROCEDURE (OUTPATIENT)
Dept: ULTRASOUND IMAGING | Facility: CLINIC | Age: 33
End: 2023-11-21
Attending: OBSTETRICS & GYNECOLOGY
Payer: COMMERCIAL

## 2023-11-21 VITALS — WEIGHT: 149 LBS | BODY MASS INDEX: 25.31 KG/M2 | DIASTOLIC BLOOD PRESSURE: 73 MMHG | SYSTOLIC BLOOD PRESSURE: 138 MMHG

## 2023-11-21 DIAGNOSIS — Z34.80 SUPERVISION OF OTHER NORMAL PREGNANCY, ANTEPARTUM: ICD-10-CM

## 2023-11-21 DIAGNOSIS — Z34.80 SUPERVISION OF OTHER NORMAL PREGNANCY, ANTEPARTUM: Primary | ICD-10-CM

## 2023-11-21 LAB — RADIOLOGIST FLAGS: NORMAL

## 2023-11-21 PROCEDURE — 99207 PR PRENATAL VISIT: CPT | Performed by: OBSTETRICS & GYNECOLOGY

## 2023-11-21 PROCEDURE — 87653 STREP B DNA AMP PROBE: CPT | Performed by: OBSTETRICS & GYNECOLOGY

## 2023-11-21 PROCEDURE — 76816 OB US FOLLOW-UP PER FETUS: CPT | Performed by: RADIOLOGY

## 2023-11-21 NOTE — PROGRESS NOTES
"37w4d  Tired.  No HA, visual changes, N/V etc.  Labor plan and warning s/s discussed. RTC 1 wk/prn. Vertex on BS US GBS done. Nurse for exam  Vital signs:      BP: 138/73               Weight: 67.6 kg (149 lb)  Estimated body mass index is 25.31 kg/m  as calculated from the following:    Height as of 9/9/22: 1.634 m (5' 4.33\").    Weight as of this encounter: 67.6 kg (149 lb).         ICD-10-CM    1. Supervision of other normal pregnancy, antepartum  Z34.80 Group B strep PCR        CEPHAS AGBEH, MD.    "

## 2023-11-22 ENCOUNTER — PRE VISIT (OUTPATIENT)
Dept: MATERNAL FETAL MEDICINE | Facility: CLINIC | Age: 33
End: 2023-11-22
Payer: COMMERCIAL

## 2023-11-22 ENCOUNTER — TRANSCRIBE ORDERS (OUTPATIENT)
Dept: MATERNAL FETAL MEDICINE | Facility: CLINIC | Age: 33
End: 2023-11-22
Payer: COMMERCIAL

## 2023-11-22 DIAGNOSIS — O26.90 PREGNANCY RELATED CONDITION, ANTEPARTUM: Primary | ICD-10-CM

## 2023-11-22 DIAGNOSIS — O35.EXX0 FETAL HYDRONEPHROSIS DURING PREGNANCY, ANTEPARTUM, SINGLE OR UNSPECIFIED FETUS: ICD-10-CM

## 2023-11-22 DIAGNOSIS — Z34.80 SUPERVISION OF OTHER NORMAL PREGNANCY, ANTEPARTUM: Primary | ICD-10-CM

## 2023-11-22 LAB — GP B STREP DNA SPEC QL NAA+PROBE: POSITIVE

## 2023-11-24 ENCOUNTER — OFFICE VISIT (OUTPATIENT)
Dept: MATERNAL FETAL MEDICINE | Facility: CLINIC | Age: 33
End: 2023-11-24
Attending: OBSTETRICS & GYNECOLOGY
Payer: COMMERCIAL

## 2023-11-24 ENCOUNTER — HOSPITAL ENCOUNTER (OUTPATIENT)
Dept: ULTRASOUND IMAGING | Facility: CLINIC | Age: 33
Discharge: HOME | End: 2023-11-24
Attending: OBSTETRICS & GYNECOLOGY
Payer: COMMERCIAL

## 2023-11-24 DIAGNOSIS — O35.EXX0 PYELECTASIS OF FETUS ON PRENATAL ULTRASOUND: Primary | ICD-10-CM

## 2023-11-24 DIAGNOSIS — O26.90 PREGNANCY RELATED CONDITION, ANTEPARTUM: ICD-10-CM

## 2023-11-24 DIAGNOSIS — O35.EXX0 ENCOUNTER FOR REPEAT ULTRASOUND OF FETAL PYELECTASIS, ANTEPARTUM, SINGLE OR UNSPECIFIED FETUS: ICD-10-CM

## 2023-11-24 PROBLEM — Z34.93 NORMAL PREGNANCY IN THIRD TRIMESTER: Status: ACTIVE | Noted: 2017-11-17

## 2023-11-24 PROBLEM — Z30.42 ENCOUNTER FOR SURVEILLANCE OF INJECTABLE CONTRACEPTIVE: Status: RESOLVED | Noted: 2022-02-08 | Resolved: 2023-11-24

## 2023-11-24 PROBLEM — O99.820 GBS (GROUP B STREPTOCOCCUS CARRIER), +RV CULTURE, CURRENTLY PREGNANT: Status: ACTIVE | Noted: 2023-11-24

## 2023-11-24 PROCEDURE — 76811 OB US DETAILED SNGL FETUS: CPT

## 2023-11-24 PROCEDURE — 76811 OB US DETAILED SNGL FETUS: CPT | Mod: 26 | Performed by: OBSTETRICS & GYNECOLOGY

## 2023-11-24 NOTE — NURSING NOTE
Pt at [facility] for ultrasound- see detailed report under imaging tab.  Pt reports positive fetal movement, denies other concerns at this time.  Pediatric urology referral placed, pt provided with contact information.  SBAR given to MD.

## 2023-11-24 NOTE — PROGRESS NOTES
Please refer to ultrasound report under 'Imaging' Studies of 'Chart Review' tabs.    Eddie Guevara M.D.

## 2023-11-27 ENCOUNTER — PRE VISIT (OUTPATIENT)
Dept: UROLOGY | Facility: CLINIC | Age: 33
End: 2023-11-27
Payer: COMMERCIAL

## 2023-11-27 NOTE — TELEPHONE ENCOUNTER
Chart reviewed patient contact not needed prior to appointment all necessary results available and ready for visit.        Carlos Manuel Rockwell MA

## 2023-11-28 ENCOUNTER — PRENATAL OFFICE VISIT (OUTPATIENT)
Dept: OBGYN | Facility: CLINIC | Age: 33
End: 2023-11-28
Payer: COMMERCIAL

## 2023-11-28 ENCOUNTER — VIRTUAL VISIT (OUTPATIENT)
Dept: UROLOGY | Facility: CLINIC | Age: 33
End: 2023-11-28
Attending: NURSE PRACTITIONER
Payer: COMMERCIAL

## 2023-11-28 ENCOUNTER — HOSPITAL ENCOUNTER (INPATIENT)
Facility: CLINIC | Age: 33
LOS: 3 days | Discharge: HOME HEALTH | End: 2023-12-01
Attending: OBSTETRICS & GYNECOLOGY | Admitting: OBSTETRICS & GYNECOLOGY
Payer: COMMERCIAL

## 2023-11-28 VITALS
HEART RATE: 90 BPM | BODY MASS INDEX: 24.99 KG/M2 | DIASTOLIC BLOOD PRESSURE: 75 MMHG | SYSTOLIC BLOOD PRESSURE: 122 MMHG | WEIGHT: 147.1 LBS

## 2023-11-28 DIAGNOSIS — O35.EXX0 ENCOUNTER FOR REPEAT ULTRASOUND OF FETAL PYELECTASIS, ANTEPARTUM, SINGLE OR UNSPECIFIED FETUS: ICD-10-CM

## 2023-11-28 DIAGNOSIS — O35.EXX0 FETAL HYDRONEPHROSIS DURING PREGNANCY, ANTEPARTUM, SINGLE OR UNSPECIFIED FETUS: Primary | ICD-10-CM

## 2023-11-28 DIAGNOSIS — O99.820 GBS (GROUP B STREPTOCOCCUS CARRIER), +RV CULTURE, CURRENTLY PREGNANT: ICD-10-CM

## 2023-11-28 DIAGNOSIS — M54.50 ACUTE MIDLINE LOW BACK PAIN WITHOUT SCIATICA: ICD-10-CM

## 2023-11-28 DIAGNOSIS — Z34.80 SUPERVISION OF OTHER NORMAL PREGNANCY, ANTEPARTUM: ICD-10-CM

## 2023-11-28 DIAGNOSIS — O35.EXX0 PYELECTASIS OF FETUS ON PRENATAL ULTRASOUND: ICD-10-CM

## 2023-11-28 PROBLEM — Z34.90 TERM PREGNANCY: Status: ACTIVE | Noted: 2023-11-28

## 2023-11-28 LAB
ABO/RH(D): NORMAL
ANTIBODY SCREEN: NEGATIVE
ERYTHROCYTE [DISTWIDTH] IN BLOOD BY AUTOMATED COUNT: 14 % (ref 10–15)
HCT VFR BLD AUTO: 35.1 % (ref 35–47)
HGB BLD-MCNC: 11.5 G/DL (ref 11.7–15.7)
MCH RBC QN AUTO: 29.6 PG (ref 26.5–33)
MCHC RBC AUTO-ENTMCNC: 32.8 G/DL (ref 31.5–36.5)
MCV RBC AUTO: 90 FL (ref 78–100)
PLATELET # BLD AUTO: 245 10E3/UL (ref 150–450)
RBC # BLD AUTO: 3.89 10E6/UL (ref 3.8–5.2)
SPECIMEN EXPIRATION DATE: NORMAL
WBC # BLD AUTO: 5 10E3/UL (ref 4–11)

## 2023-11-28 PROCEDURE — 85027 COMPLETE CBC AUTOMATED: CPT

## 2023-11-28 PROCEDURE — 36415 COLL VENOUS BLD VENIPUNCTURE: CPT

## 2023-11-28 PROCEDURE — 250N000011 HC RX IP 250 OP 636

## 2023-11-28 PROCEDURE — 86850 RBC ANTIBODY SCREEN: CPT

## 2023-11-28 PROCEDURE — 250N000009 HC RX 250

## 2023-11-28 PROCEDURE — 86901 BLOOD TYPING SEROLOGIC RH(D): CPT

## 2023-11-28 PROCEDURE — 250N000009 HC RX 250: Performed by: OBSTETRICS & GYNECOLOGY

## 2023-11-28 PROCEDURE — 120N000002 HC R&B MED SURG/OB UMMC

## 2023-11-28 PROCEDURE — 250N000013 HC RX MED GY IP 250 OP 250 PS 637

## 2023-11-28 PROCEDURE — 99443 PR PHYSICIAN TELEPHONE EVALUATION 21-30 MIN: CPT | Mod: 93 | Performed by: NURSE PRACTITIONER

## 2023-11-28 PROCEDURE — 86780 TREPONEMA PALLIDUM: CPT

## 2023-11-28 PROCEDURE — 99207 PR PRENATAL VISIT: CPT | Performed by: OBSTETRICS & GYNECOLOGY

## 2023-11-28 PROCEDURE — 722N000001 HC LABOR CARE VAGINAL DELIVERY SINGLE

## 2023-11-28 PROCEDURE — 59409 OBSTETRICAL CARE: CPT | Performed by: OBSTETRICS & GYNECOLOGY

## 2023-11-28 RX ORDER — LIDOCAINE HYDROCHLORIDE 10 MG/ML
INJECTION, SOLUTION EPIDURAL; INFILTRATION; INTRACAUDAL; PERINEURAL
Status: COMPLETED
Start: 2023-11-28 | End: 2023-11-28

## 2023-11-28 RX ORDER — METHYLERGONOVINE MALEATE 0.2 MG/ML
200 INJECTION INTRAVENOUS
Status: DISCONTINUED | OUTPATIENT
Start: 2023-11-28 | End: 2023-12-01 | Stop reason: HOSPADM

## 2023-11-28 RX ORDER — HYDROCORTISONE 25 MG/G
CREAM TOPICAL 3 TIMES DAILY PRN
Status: DISCONTINUED | OUTPATIENT
Start: 2023-11-28 | End: 2023-12-01 | Stop reason: HOSPADM

## 2023-11-28 RX ORDER — KETOROLAC TROMETHAMINE 30 MG/ML
30 INJECTION, SOLUTION INTRAMUSCULAR; INTRAVENOUS
Status: DISCONTINUED | OUTPATIENT
Start: 2023-11-28 | End: 2023-12-01 | Stop reason: HOSPADM

## 2023-11-28 RX ORDER — NALOXONE HYDROCHLORIDE 0.4 MG/ML
0.4 INJECTION, SOLUTION INTRAMUSCULAR; INTRAVENOUS; SUBCUTANEOUS
Status: DISCONTINUED | OUTPATIENT
Start: 2023-11-28 | End: 2023-11-28 | Stop reason: HOSPADM

## 2023-11-28 RX ORDER — NALOXONE HYDROCHLORIDE 0.4 MG/ML
0.2 INJECTION, SOLUTION INTRAMUSCULAR; INTRAVENOUS; SUBCUTANEOUS
Status: DISCONTINUED | OUTPATIENT
Start: 2023-11-28 | End: 2023-11-28 | Stop reason: HOSPADM

## 2023-11-28 RX ORDER — DOCUSATE SODIUM 100 MG/1
100 CAPSULE, LIQUID FILLED ORAL DAILY
Status: DISCONTINUED | OUTPATIENT
Start: 2023-11-29 | End: 2023-12-01 | Stop reason: HOSPADM

## 2023-11-28 RX ORDER — IBUPROFEN 800 MG/1
800 TABLET, FILM COATED ORAL EVERY 6 HOURS PRN
Status: DISCONTINUED | OUTPATIENT
Start: 2023-11-28 | End: 2023-12-01 | Stop reason: HOSPADM

## 2023-11-28 RX ORDER — OXYTOCIN 10 [USP'U]/ML
10 INJECTION, SOLUTION INTRAMUSCULAR; INTRAVENOUS
Status: DISCONTINUED | OUTPATIENT
Start: 2023-11-28 | End: 2023-11-28 | Stop reason: HOSPADM

## 2023-11-28 RX ORDER — CITRIC ACID/SODIUM CITRATE 334-500MG
30 SOLUTION, ORAL ORAL ONCE
Status: DISCONTINUED | OUTPATIENT
Start: 2023-11-28 | End: 2023-11-28 | Stop reason: HOSPADM

## 2023-11-28 RX ORDER — MISOPROSTOL 200 UG/1
400 TABLET ORAL
Status: DISCONTINUED | OUTPATIENT
Start: 2023-11-28 | End: 2023-11-28 | Stop reason: HOSPADM

## 2023-11-28 RX ORDER — IBUPROFEN 800 MG/1
800 TABLET, FILM COATED ORAL
Status: DISCONTINUED | OUTPATIENT
Start: 2023-11-28 | End: 2023-12-01 | Stop reason: HOSPADM

## 2023-11-28 RX ORDER — OXYTOCIN/0.9 % SODIUM CHLORIDE 30/500 ML
100-340 PLASTIC BAG, INJECTION (ML) INTRAVENOUS CONTINUOUS PRN
Status: DISCONTINUED | OUTPATIENT
Start: 2023-11-28 | End: 2023-12-01 | Stop reason: HOSPADM

## 2023-11-28 RX ORDER — METOCLOPRAMIDE 10 MG/1
10 TABLET ORAL EVERY 6 HOURS PRN
Status: DISCONTINUED | OUTPATIENT
Start: 2023-11-28 | End: 2023-11-28 | Stop reason: HOSPADM

## 2023-11-28 RX ORDER — TERBUTALINE SULFATE 1 MG/ML
0.25 INJECTION, SOLUTION SUBCUTANEOUS
Status: DISCONTINUED | OUTPATIENT
Start: 2023-11-28 | End: 2023-11-28 | Stop reason: HOSPADM

## 2023-11-28 RX ORDER — OXYTOCIN/0.9 % SODIUM CHLORIDE 30/500 ML
340 PLASTIC BAG, INJECTION (ML) INTRAVENOUS CONTINUOUS PRN
Status: DISCONTINUED | OUTPATIENT
Start: 2023-11-28 | End: 2023-12-01 | Stop reason: HOSPADM

## 2023-11-28 RX ORDER — MISOPROSTOL 200 UG/1
800 TABLET ORAL
Status: DISCONTINUED | OUTPATIENT
Start: 2023-11-28 | End: 2023-12-01 | Stop reason: HOSPADM

## 2023-11-28 RX ORDER — OXYTOCIN 10 [USP'U]/ML
INJECTION, SOLUTION INTRAMUSCULAR; INTRAVENOUS
Status: DISCONTINUED
Start: 2023-11-28 | End: 2023-11-29 | Stop reason: WASHOUT

## 2023-11-28 RX ORDER — MISOPROSTOL 200 UG/1
800 TABLET ORAL
Status: DISCONTINUED | OUTPATIENT
Start: 2023-11-28 | End: 2023-11-28 | Stop reason: HOSPADM

## 2023-11-28 RX ORDER — MODIFIED LANOLIN
OINTMENT (GRAM) TOPICAL
Status: DISCONTINUED | OUTPATIENT
Start: 2023-11-28 | End: 2023-12-01 | Stop reason: HOSPADM

## 2023-11-28 RX ORDER — SODIUM CHLORIDE, SODIUM LACTATE, POTASSIUM CHLORIDE, CALCIUM CHLORIDE 600; 310; 30; 20 MG/100ML; MG/100ML; MG/100ML; MG/100ML
INJECTION, SOLUTION INTRAVENOUS CONTINUOUS PRN
Status: DISCONTINUED | OUTPATIENT
Start: 2023-11-28 | End: 2023-11-28 | Stop reason: HOSPADM

## 2023-11-28 RX ORDER — MISOPROSTOL 200 UG/1
TABLET ORAL
Status: COMPLETED
Start: 2023-11-28 | End: 2023-11-28

## 2023-11-28 RX ORDER — CITRIC ACID/SODIUM CITRATE 334-500MG
30 SOLUTION, ORAL ORAL
Status: DISCONTINUED | OUTPATIENT
Start: 2023-11-28 | End: 2023-11-28 | Stop reason: HOSPADM

## 2023-11-28 RX ORDER — TRANEXAMIC ACID 10 MG/ML
1 INJECTION, SOLUTION INTRAVENOUS EVERY 30 MIN PRN
Status: DISCONTINUED | OUTPATIENT
Start: 2023-11-28 | End: 2023-12-01 | Stop reason: HOSPADM

## 2023-11-28 RX ORDER — OXYTOCIN/0.9 % SODIUM CHLORIDE 30/500 ML
PLASTIC BAG, INJECTION (ML) INTRAVENOUS
Status: DISCONTINUED
Start: 2023-11-28 | End: 2023-11-29 | Stop reason: HOSPADM

## 2023-11-28 RX ORDER — OXYTOCIN/0.9 % SODIUM CHLORIDE 30/500 ML
1-24 PLASTIC BAG, INJECTION (ML) INTRAVENOUS CONTINUOUS
Status: DISCONTINUED | OUTPATIENT
Start: 2023-11-28 | End: 2023-11-28 | Stop reason: HOSPADM

## 2023-11-28 RX ORDER — ONDANSETRON 2 MG/ML
4 INJECTION INTRAMUSCULAR; INTRAVENOUS EVERY 6 HOURS PRN
Status: DISCONTINUED | OUTPATIENT
Start: 2023-11-28 | End: 2023-11-28 | Stop reason: HOSPADM

## 2023-11-28 RX ORDER — CARBOPROST TROMETHAMINE 250 UG/ML
250 INJECTION, SOLUTION INTRAMUSCULAR
Status: DISCONTINUED | OUTPATIENT
Start: 2023-11-28 | End: 2023-11-28 | Stop reason: HOSPADM

## 2023-11-28 RX ORDER — OXYTOCIN 10 [USP'U]/ML
10 INJECTION, SOLUTION INTRAMUSCULAR; INTRAVENOUS
Status: DISCONTINUED | OUTPATIENT
Start: 2023-11-28 | End: 2023-12-01 | Stop reason: HOSPADM

## 2023-11-28 RX ORDER — ACETAMINOPHEN 325 MG/1
650 TABLET ORAL EVERY 4 HOURS PRN
Status: DISCONTINUED | OUTPATIENT
Start: 2023-11-28 | End: 2023-12-01 | Stop reason: HOSPADM

## 2023-11-28 RX ORDER — LIDOCAINE 40 MG/G
CREAM TOPICAL
Status: DISCONTINUED | OUTPATIENT
Start: 2023-11-28 | End: 2023-11-28 | Stop reason: HOSPADM

## 2023-11-28 RX ORDER — MISOPROSTOL 200 UG/1
400 TABLET ORAL
Status: DISCONTINUED | OUTPATIENT
Start: 2023-11-28 | End: 2023-12-01 | Stop reason: HOSPADM

## 2023-11-28 RX ORDER — TRANEXAMIC ACID 10 MG/ML
1 INJECTION, SOLUTION INTRAVENOUS EVERY 30 MIN PRN
Status: DISCONTINUED | OUTPATIENT
Start: 2023-11-28 | End: 2023-11-28 | Stop reason: HOSPADM

## 2023-11-28 RX ORDER — METOCLOPRAMIDE HYDROCHLORIDE 5 MG/ML
10 INJECTION INTRAMUSCULAR; INTRAVENOUS EVERY 6 HOURS PRN
Status: DISCONTINUED | OUTPATIENT
Start: 2023-11-28 | End: 2023-11-28 | Stop reason: HOSPADM

## 2023-11-28 RX ORDER — PENICILLIN G POTASSIUM 5000000 [IU]/1
5 INJECTION, POWDER, FOR SOLUTION INTRAMUSCULAR; INTRAVENOUS ONCE
Status: COMPLETED | OUTPATIENT
Start: 2023-11-28 | End: 2023-11-28

## 2023-11-28 RX ORDER — BISACODYL 10 MG
10 SUPPOSITORY, RECTAL RECTAL DAILY PRN
Status: DISCONTINUED | OUTPATIENT
Start: 2023-11-28 | End: 2023-12-01 | Stop reason: HOSPADM

## 2023-11-28 RX ORDER — CARBOPROST TROMETHAMINE 250 UG/ML
250 INJECTION, SOLUTION INTRAMUSCULAR
Status: DISCONTINUED | OUTPATIENT
Start: 2023-11-28 | End: 2023-12-01 | Stop reason: HOSPADM

## 2023-11-28 RX ORDER — ONDANSETRON 4 MG/1
4 TABLET, ORALLY DISINTEGRATING ORAL EVERY 6 HOURS PRN
Status: DISCONTINUED | OUTPATIENT
Start: 2023-11-28 | End: 2023-11-28 | Stop reason: HOSPADM

## 2023-11-28 RX ORDER — METHYLERGONOVINE MALEATE 0.2 MG/ML
200 INJECTION INTRAVENOUS
Status: DISCONTINUED | OUTPATIENT
Start: 2023-11-28 | End: 2023-11-28 | Stop reason: HOSPADM

## 2023-11-28 RX ORDER — PROCHLORPERAZINE MALEATE 10 MG
10 TABLET ORAL EVERY 6 HOURS PRN
Status: DISCONTINUED | OUTPATIENT
Start: 2023-11-28 | End: 2023-11-28 | Stop reason: HOSPADM

## 2023-11-28 RX ORDER — OXYTOCIN/0.9 % SODIUM CHLORIDE 30/500 ML
340 PLASTIC BAG, INJECTION (ML) INTRAVENOUS CONTINUOUS PRN
Status: DISCONTINUED | OUTPATIENT
Start: 2023-11-28 | End: 2023-11-28 | Stop reason: HOSPADM

## 2023-11-28 RX ORDER — PENICILLIN G 3000000 [IU]/50ML
3 INJECTION, SOLUTION INTRAVENOUS EVERY 4 HOURS
Status: DISCONTINUED | OUTPATIENT
Start: 2023-11-29 | End: 2023-11-28 | Stop reason: HOSPADM

## 2023-11-28 RX ORDER — PROCHLORPERAZINE 25 MG
25 SUPPOSITORY, RECTAL RECTAL EVERY 12 HOURS PRN
Status: DISCONTINUED | OUTPATIENT
Start: 2023-11-28 | End: 2023-11-28 | Stop reason: HOSPADM

## 2023-11-28 RX ADMIN — KETOROLAC TROMETHAMINE 30 MG: 30 INJECTION, SOLUTION INTRAMUSCULAR; INTRAVENOUS at 22:33

## 2023-11-28 RX ADMIN — MISOPROSTOL 400 MCG: 200 TABLET ORAL at 22:28

## 2023-11-28 RX ADMIN — Medication 2 MILLI-UNITS/MIN: at 21:41

## 2023-11-28 RX ADMIN — PENICILLIN G POTASSIUM 5 MILLION UNITS: 5000000 POWDER, FOR SOLUTION INTRAMUSCULAR; INTRAPLEURAL; INTRATHECAL; INTRAVENOUS at 20:43

## 2023-11-28 RX ADMIN — LIDOCAINE HYDROCHLORIDE 20 ML: 10 INJECTION, SOLUTION EPIDURAL; INFILTRATION; INTRACAUDAL; PERINEURAL at 22:28

## 2023-11-28 ASSESSMENT — ACTIVITIES OF DAILY LIVING (ADL)
WEAR_GLASSES_OR_BLIND: NO
DIFFICULTY_EATING/SWALLOWING: NO
HEARING_DIFFICULTY_OR_DEAF: NO
DOING_ERRANDS_INDEPENDENTLY_DIFFICULTY: NO
ADLS_ACUITY_SCORE: 18
ADLS_ACUITY_SCORE: 18
DIFFICULTY_COMMUNICATING: NO
CONCENTRATING,_REMEMBERING_OR_MAKING_DECISIONS_DIFFICULTY: NO
WALKING_OR_CLIMBING_STAIRS_DIFFICULTY: NO
ADLS_ACUITY_SCORE: 18
TOILETING_ISSUES: NO
CHANGE_IN_FUNCTIONAL_STATUS_SINCE_ONSET_OF_CURRENT_ILLNESS/INJURY: NO
FALL_HISTORY_WITHIN_LAST_SIX_MONTHS: NO
DRESSING/BATHING_DIFFICULTY: NO

## 2023-11-28 ASSESSMENT — PAIN SCALES - GENERAL: PAINLEVEL: NO PAIN (0)

## 2023-11-28 NOTE — PROGRESS NOTES
Referred by Dr. Eddie Guevara      RE:  Robert Anand  :  1990  [place] MRN:  [id number]  Date of visit:  2023    Dear Dr. Guevara:    I had the pleasure of seeing your patient, Robert, today through the [facility] in urology consultation via telephone visit for the question of prenatally detected congenital hydroureteronephrosis.  Please see below the details of this visit and my impression and plans discussed with the family.    CC:   finding of bilateral hydroureteronephrosis    HPI:  Robert is a 33 year old woman whom I was asked to see in consultation for the above.  This is [facility] fourth pregnancy.  The sex of the fetus is male.  At the 38 week ultrasound it was noted that the fetus has bilateral pyelocaliectasis with dilation of the proximal ureter concerning for severe reflux or an obstructive process such as posterior urethral valves and consistent with UTD 2-3. Normal amnotic fluid noted on ultrasound.  So far the pregnancy has been uncomplicated.  There is family history of genitourinary disorders in childhood, Robert's older son that is now 4 years old had been followed by a urologist for unilateral congenital hydronephrosis (mom said only one side had trouble training urine), this has now resolved.    PE:  GENERAL: Healthy, alert and no distress  RESP: No audible wheeze, cough.  PSYCH: Mentation appears normal, affect normal/bright, judgement and insight intact, normal speech.    Impression:    Prenatal finding: fetus has bilateral pyelocaliectasis with dilation of the proximal ureter concerning for severe reflux or an obstructive process such as posterior urethral valves and consistent with UTD 2-3    Discussion: Recommend delivering at [facility] due to severity of bilateral hydroureteronephrosis and what appears to be distended bladder.  Since baby is a boy we would recommend getting JAYLEN and VUCG before he goes home  due. Parents questions were answered and they agreed.  Parents are wanting him to have  circumcision.    We discussed the likely prenatal causes for this, including prenatal obstructive issues that have already resolved versus those that may need surgical help with resolution in childhood, as well as the possibility of vesicoureteral reflux.  We discussed the risks for urinary tract infection, and the pros and cons of starting the baby on daily, low-dose Amoxicillin, dosed at 10-15 mg/kg/d, which in this case we may recommend based on VCUG results.    Plan:    We recommend delivering at [facility] where peds urology is present if baby needs specialized care after birth.  After 48 hours get renal ultrasound and VCUG.  [facility] on call as needed at [phone].  [place] circumcision (can be done outpatient with our team).      I addressed all questions and encouraged a phone call from their MFM if there are any future concerns during the pregnancy.    Thank you very much for allowing me the opportunity to participate in this nice family's care with you.    Type of service:  Telephone visit  Phone call duration: Start time: 1:45 Stop Time: 2:15  Total Time: 32 minutes  Originating Location (pt. Location): Home  Distant Location (provider location):  [facility]   Mode of Communication:  Clinic phone    44 minutes spent on the date of the encounter doing chart review, history and exam, documentation, education and further activities per the note.    Sincerely,  Breann GALARZA, COLLEENNP  Pediatric Urology  [facility]    CC Dr. Eddie Guevara

## 2023-11-28 NOTE — PATIENT INSTRUCTIONS
To Schedule an Appointment 24/7  Call: [phone]    If you have any questions regarding your visit, Please contact your care team.  [facility]: [phone]  [facility] HOURS TELEPHONE NUMBER   Cephas Agbeh, M.D.      Melissa Quesada-Surgery Scheduler  Charity-Surgery Scheduler       Monday - [place]:    8:00 am-4:45 pm  Tuesday - [place]:   8:00 am-4:45 pm  Friday-[place]:       8:00 am-4:45 pm  Typical Surgery Day:  Wednesday [facility]   [address] [place] [address]   [phone]   Fax [phone]    Imaging Scheduling all locations  [phone]      [facility]   [address] [place] [address] [place] [address]   [phone]    [facility]  [address] [facility] [address]  [phone]  Fax [phone]   Urgent Care locations:  [facility] Monday-Friday                               10 am - 8 pm  Saturday and Sunday                      9 am - 5 pm  Monday-Friday                              10 am- 8 pm  Saturday and Sunday                      9 am - 5 pm    [phone]   **Surgeries** Our Surgery Schedulers will contact you to schedule. If you do not receive a call within 3 business days, please call [phone].  If you need a medication refill, please contact your pharmacy. Please allow 3 business days for your refill to be completed.  As always, Thank you for trusting us with your healthcare needs!  see additional instructions from your care team below

## 2023-11-28 NOTE — LETTER
2023      RE: Robert Anand  [address] [facility] [address]     Dear Colleague,    Thank you for the opportunity to participate in the care of your patient, Robert Anand, at the [facility] at [facility]. Please see a copy of my visit note below.    Referred by Dr. Eddie Guevara      RE:  Robert Anand  :  1990  [place] MRN:  [id number]  Date of visit:  2023    Dear Dr. Guevara:    I had the pleasure of seeing your patient, Robert, today through the [facility] in urology consultation via telephone visit for the question of prenatally detected congenital hydroureteronephrosis.  Please see below the details of this visit and my impression and plans discussed with the family.    CC:   finding of bilateral hydroureteronephrosis    HPI:  Robert is a 33 year old woman whom I was asked to see in consultation for the above.  This is [facility] fourth pregnancy.  The sex of the fetus is male.  At the 38 week ultrasound it was noted that the fetus has bilateral pyelocaliectasis with dilation of the proximal ureter concerning for severe reflux or an obstructive process such as posterior urethral valves and consistent with UTD 2-3. Normal amnotic fluid noted on ultrasound.  So far the pregnancy has been uncomplicated.  There is family history of genitourinary disorders in childhood, Robert's older son that is now 4 years old had been followed by a urologist for unilateral congenital hydronephrosis (mom said only one side had trouble training urine), this has now resolved.    PE:  GENERAL: Healthy, alert and no distress  RESP: No audible wheeze, cough.  PSYCH: Mentation appears normal, affect normal/bright, judgement and insight intact, normal speech.    Impression:    Prenatal finding: fetus has bilateral pyelocaliectasis with dilation  of the proximal ureter concerning for severe reflux or an obstructive process such as posterior urethral valves and consistent with UTD 2-3    Discussion: Recommend delivering at [facility] due to severity of bilateral hydroureteronephrosis and what appears to be distended bladder.  Since baby is a boy we would recommend getting JAYLEN and VUCG before he goes home due. Parents questions were answered and they agreed.  Parents are wanting him to have  circumcision.    We discussed the likely prenatal causes for this, including prenatal obstructive issues that have already resolved versus those that may need surgical help with resolution in childhood, as well as the possibility of vesicoureteral reflux.  We discussed the risks for urinary tract infection, and the pros and cons of starting the baby on daily, low-dose Amoxicillin, dosed at 10-15 mg/kg/d, which in this case we may recommend based on VCUG results.    Plan:    We recommend delivering at [facility] where peds urology is present if baby needs specialized care after birth.  After 48 hours get renal ultrasound and VCUG.  [facility] on call as needed at [phone].   circumcision (can be done outpatient with our team).      I addressed all questions and encouraged a phone call from their MFM if there are any future concerns during the pregnancy.    Thank you very much for allowing me the opportunity to participate in this nice family's care with you.    Type of service:  Telephone visit  Phone call duration: Start time: 1:45 Stop Time: 2:15  Total Time: 32 minutes  Originating Location (pt. Location): Home  Distant Location (provider location):  [facility]   Mode of Communication:  Clinic phone    44 minutes spent on the date of the encounter doing chart review, history and exam, documentation, education and further activities per the note.    Sincerely,  Breann Zamudio  APRN, CPNP  Pediatric Urology  [facility]    CC Dr. Eddie Guevara     [phone]

## 2023-11-28 NOTE — PROGRESS NOTES
"38w4d patient is here with her ..  Telephone consult with pediatrics urologist after her referral from maternal-fetal medicine..  For pyelectasis of the fetus and hydronephrosis...  Peds urology patient is to deliver at [facility]..  Patient says she been madisyn for the last 3 to 3 days.  She denies any leakage of fluid..  Cervix is found to be 3 to 4 cm x 80% effaced..  I have advised her to go to labor and delivery at [place] now.  Labor and delivery has been notified that patient is on her way..  Vital signs:      BP: 122/75 Pulse: 90             Weight: 66.7 kg (147 lb 1.6 oz)  Estimated body mass index is 24.99 kg/m  as calculated from the following:    Height as of 9/9/22: 1.634 m (5' 4.33\").    Weight as of this encounter: 66.7 kg (147 lb 1.6 oz).        ICD-10-CM    1. Fetal hydronephrosis during pregnancy, antepartum,single  O35.EXX0       2. Supervision of other normal pregnancy, antepartum  Z34.80       3. Pyelectasis of fetus on prenatal ultrasound  O35.EXX0       4. GBS (group B Streptococcus carrier), +RV culture, currently pregnant  O99.820         CEPHAS AGBEH, MD.      "

## 2023-11-28 NOTE — PROCEDURES
Pt seen in clinic today, dilated 3-4 cm and laboring.    Per 11/24 appt MFM wants pt to deliver at [facility] due to fetal bilateral hydronephrosis to hand off to peds urology at [facility].    RN called and gave report to [facility] Charge RN.    Address to [facility] given to pt.    Dr. Agbeh notified report given.

## 2023-11-29 LAB
HGB BLD-MCNC: 11.6 G/DL (ref 11.7–15.7)
T PALLIDUM AB SER QL: NONREACTIVE

## 2023-11-29 PROCEDURE — 85018 HEMOGLOBIN: CPT

## 2023-11-29 PROCEDURE — 120N000002 HC R&B MED SURG/OB UMMC

## 2023-11-29 PROCEDURE — 36415 COLL VENOUS BLD VENIPUNCTURE: CPT

## 2023-11-29 PROCEDURE — 99232 SBSQ HOSP IP/OBS MODERATE 35: CPT | Mod: GC | Performed by: OBSTETRICS & GYNECOLOGY

## 2023-11-29 PROCEDURE — 250N000013 HC RX MED GY IP 250 OP 250 PS 637

## 2023-11-29 RX ADMIN — IBUPROFEN 800 MG: 800 TABLET, FILM COATED ORAL at 06:11

## 2023-11-29 RX ADMIN — IBUPROFEN 800 MG: 800 TABLET, FILM COATED ORAL at 15:53

## 2023-11-29 RX ADMIN — ACETAMINOPHEN 650 MG: 325 TABLET, FILM COATED ORAL at 18:35

## 2023-11-29 RX ADMIN — ACETAMINOPHEN 650 MG: 325 TABLET, FILM COATED ORAL at 01:07

## 2023-11-29 RX ADMIN — ACETAMINOPHEN 650 MG: 325 TABLET, FILM COATED ORAL at 10:10

## 2023-11-29 RX ADMIN — BENZOCAINE AND LEVOMENTHOL: 200; 5 SPRAY TOPICAL at 15:53

## 2023-11-29 RX ADMIN — ACETAMINOPHEN 650 MG: 325 TABLET, FILM COATED ORAL at 23:29

## 2023-11-29 RX ADMIN — DOCUSATE SODIUM 100 MG: 100 CAPSULE, LIQUID FILLED ORAL at 07:52

## 2023-11-29 RX ADMIN — ACETAMINOPHEN 650 MG: 325 TABLET, FILM COATED ORAL at 06:11

## 2023-11-29 RX ADMIN — IBUPROFEN 800 MG: 800 TABLET, FILM COATED ORAL at 23:29

## 2023-11-29 ASSESSMENT — ACTIVITIES OF DAILY LIVING (ADL)
ADLS_ACUITY_SCORE: 18

## 2023-11-29 NOTE — PLAN OF CARE
Data: Vital signs within normal limits. Postpartum checks within normal limits - see flow record. Patient eating and drinking normally. Patient able to empty bladder independently and is up ambulating. No apparent signs of infection. healing well. Patient performing self cares and is able to care for infant.  Action: Patient medicated during the shift for pain and cramping. See MAR. Patient reassessed within 1 hour after each medication and pain was improved - patient stated she was comfortable. Patient using dermoplast. Patient education done about plan of care. See flow record.  Response: Positive attachment behaviors observed with infant.

## 2023-11-29 NOTE — PLAN OF CARE
"Goal Outcome Evaluation:      Plan of Care Reviewed With: patient, spouse    Overall Patient Progress: improvingOverall Patient Progress: improving    VSS ex. Mild elevated BP at times (140s/90s) and postpartum assessments WDL.  Up ad sebastian with steady gait and independent with cares.  Bonding well with infant.  Breastfeeding on cue independently and formula feeding 2-10mL.  Pain managed with Tylenol and Toradol. Family present and supportive at beginning of shift.  Will continue with postpartum cares and education per plan of care.       Problem: Adult Inpatient Plan of Care  Goal: Plan of Care Review  Description: The Plan of Care Review/Shift note should be completed every shift.  The Outcome Evaluation is a brief statement about your assessment that the patient is improving, declining, or no change.  This information will be displayed automatically on your shift  note.  11/29/2023 0448 by Jillian Velázquez, RN  Outcome: Progressing  Flowsheets (Taken 11/29/2023 0448)  Plan of Care Reviewed With:   patient   spouse  Overall Patient Progress: improving  11/29/2023 0447 by Jillian Velázquez, RN  Outcome: Progressing  Flowsheets (Taken 11/29/2023 0447)  Plan of Care Reviewed With:   patient   spouse  Overall Patient Progress: improving  Goal: Patient-Specific Goal (Individualized)  Description: You can add care plan individualizations to a care plan. Examples of Individualization might be:  \"Parent requests to be called daily at 9am for status\", \"I have a hard time hearing out of my right ear\", or \"Do not touch me to wake me up as it startles  me\".  11/29/2023 0448 by Jillian Velázquez, RN  Outcome: Progressing  11/29/2023 0447 by Jillian Velázquez, RN  Outcome: Progressing  Goal: Absence of Hospital-Acquired Illness or Injury  11/29/2023 0448 by Jillian Velázquez, RN  Outcome: Progressing  11/29/2023 0447 by Jillian Velázquez, RN  Outcome: Progressing  Intervention: Prevent Skin Injury  Recent Flowsheet " Documentation  Taken 11/29/2023 0033 by Jillian Velázquez RN  Body Position: position changed independently  Intervention: Prevent Infection  Recent Flowsheet Documentation  Taken 11/29/2023 0033 by Jillian Velázquez RN  Infection Prevention:   hand hygiene promoted   rest/sleep promoted  Goal: Optimal Comfort and Wellbeing  11/29/2023 0448 by Jillian Velázquez RN  Outcome: Progressing  11/29/2023 0447 by Jillian Velázquez RN  Outcome: Progressing  Intervention: Provide Person-Centered Care  Recent Flowsheet Documentation  Taken 11/29/2023 0033 by Jillian Velázquez RN  Trust Relationship/Rapport:   care explained   choices provided   questions answered   questions encouraged   thoughts/feelings acknowledged   reassurance provided  Goal: Readiness for Transition of Care  11/29/2023 0448 by Jillian Velázquez RN  Outcome: Progressing  11/29/2023 0447 by Jillian Velázquez RN  Outcome: Progressing     Problem: Postpartum (Vaginal Delivery)  Goal: Successful Parent Role Transition  11/29/2023 0448 by Jillian Velázquez RN  Outcome: Progressing  11/29/2023 0447 by Jillian Velázquez RN  Outcome: Progressing  Goal: Hemostasis  11/29/2023 0448 by Jillian Velázquez RN  Outcome: Progressing  11/29/2023 0447 by Jillian Velázquez RN  Outcome: Progressing  Goal: Absence of Infection Signs and Symptoms  11/29/2023 0448 by Jillian Velázquez, RN  Outcome: Progressing  11/29/2023 0447 by Jillian Velázquez RN  Outcome: Progressing  Goal: Optimal Pain Control and Function  11/29/2023 0448 by Jillian Velázquez, RN  Outcome: Progressing  11/29/2023 0447 by Jillian Velázquez, RN  Outcome: Progressing

## 2023-11-29 NOTE — PLAN OF CARE
Data: Robert Anand transferred to 7142 via wheelchair at 0030  . Baby transferred via parent's arms.  Action: Receiving unit notified of transfer: Yes. Patient and family notified of room change. Report given to Jillian at 3007. Belongings sent to receiving unit. Accompanied by Registered Nurse. Oriented patient to surroundings. Call light within reach. ID bands double-checked with receiving RN.  Response: Patient tolerated transfer and is stable.

## 2023-11-29 NOTE — DISCHARGE SUMMARY
[facility] Discharge Summary    Robert Anand MRN# [id number]   Age: 33 year old YOB: 1990     Date of Admission:  2023  Date of Discharge::  23    Admitting Physician:  Jen Garcia MD  Discharge Physician:  Sophie Jay MD            Admission Diagnoses:   -   - IUP at 38w4d  - Fetus with bilateral pyelocaliectasis, dilation of proximal ureter  - GBS positive           Discharge Diagnosis:   - Postpartum, s/p delivery of viable infant  - Gestational HTN             Procedures:     Procedure(s): - Normal spontaneous vaginal delivery  - Electronic fetal monitoring                 Medications Prior to Admission:     Medications Prior to Admission   Medication Sig Dispense Refill Last Dose    cyclobenzaprine (FLEXERIL) 5 MG tablet Take 1 tablet (5 mg) by mouth nightly as needed for muscle spasms 30 tablet 5 More than a month    ferrous sulfate (FEROSUL) 325 (65 Fe) MG tablet Take 1 tablet (325 mg) by mouth daily (with breakfast) 60 tablet 3 More than a month    Ferrous Sulfate (IRON) 325 (65 Fe) MG tablet Take 1 tablet by mouth daily 120 tablet 3 More than a month    fluocinonide (LIDEX) 0.05 % external cream Apply twice daily for up to to one week for rash on the neck 30 g 11 More than a month    folic acid (FOLVITE) 1 MG tablet Take 1 tablet (1 mg) by mouth daily 90 tablet 3 More than a month    hydrocortisone 2.5 % cream Apply twice daily for 1 week, then 3 times weekly for 1 week, then repeat 60 g 1 More than a month    ibuprofen (ADVIL/MOTRIN) 600 MG tablet Take 1 tablet (600 mg) by mouth every 6 hours as needed for moderate pain 60 tablet 0 More than a month    ondansetron (ZOFRAN ODT) 8 MG ODT tab Take 1 tablet (8 mg) by mouth every 8 hours as needed for nausea 30 tablet 1 More than a month    Prenatal Vit-Fe Fumarate-FA (PRENATAL MULTIVITAMIN W/IRON) 27-0.8 MG tablet Take 1 tablet by mouth daily 90 tablet 3 More than a month    tacrolimus (PROTOPIC) 0.1 %  external ointment Apply twice daily as needed for rash on the trunk, extremities, neck, and ears. 60 g 11 More than a month    tretinoin (RETIN-A) 0.025 % external cream Apple a thin layer to bilateral cheeks at nighttime before bed 45 g 11 More than a month    triamcinolone (KENALOG) 0.025 % cream Apply twice daily as needed for rash on trunk, extremities, neck, ears for up to two weeks at a time 80 g 11 More than a month    triamcinolone (KENALOG) 0.025 % external ointment Apply topically 2 times daily 15 g 3 More than a month             Discharge Medications:        Review of your medicines        START taking        Dose / Directions   acetaminophen 325 MG tablet  Commonly known as: TYLENOL  Used for:  (normal spontaneous vaginal delivery)      Dose: 650 mg  Take 2 tablets (650 mg) by mouth every 6 hours as needed for mild pain Start after Delivery.  Quantity: 30 tablet  Refills: 0     senna-docusate 8.6-50 MG tablet  Commonly known as: SENOKOT-S/PERICOLACE  Used for:  (normal spontaneous vaginal delivery)      Dose: 1 tablet  Take 1 tablet by mouth daily Start after delivery.  Quantity: 100 tablet  Refills: 0            CONTINUE these medicines which may have CHANGED, or have new prescriptions. If we are uncertain of the size of tablets/capsules you have at home, strength may be listed as something that might have changed.        Dose / Directions   ferrous sulfate 325 (65 Fe) MG tablet  Commonly known as: FEROSUL  This may have changed: Another medication with the same name was removed. Continue taking this medication, and follow the directions you see here.  Used for: Supervision of other normal pregnancy, antepartum, Iron deficiency anemia, unspecified iron deficiency anemia type      Dose: 325 mg  Take 1 tablet (325 mg) by mouth daily (with breakfast)  Quantity: 60 tablet  Refills: 3     * ibuprofen 600 MG tablet  Commonly known as: ADVIL/MOTRIN  This may have changed: Another medication with the  same name was added. Make sure you understand how and when to take each.  Used for: Acute midline low back pain without sciatica      Dose: 600 mg  Take 1 tablet (600 mg) by mouth every 6 hours as needed for moderate pain  Quantity: 60 tablet  Refills: 0     * ibuprofen 600 MG tablet  Commonly known as: ADVIL/MOTRIN  This may have changed: You were already taking a medication with the same name, and this prescription was added. Make sure you understand how and when to take each.  Used for:  (normal spontaneous vaginal delivery)      Dose: 600 mg  Take 1 tablet (600 mg) by mouth every 6 hours as needed for moderate pain Start after delivery  Quantity: 30 tablet  Refills: 0           * This list has 2 medication(s) that are the same as other medications prescribed for you. Read the directions carefully, and ask your doctor or other care provider to review them with you.                CONTINUE these medicines which have NOT CHANGED        Dose / Directions   fluocinonide 0.05 % external cream  Commonly known as: LIDEX  Used for: Atopic dermatitis, unspecified type      Apply twice daily for up to to one week for rash on the neck  Quantity: 30 g  Refills: 11     folic acid 1 MG tablet  Commonly known as: FOLVITE  Used for: Encounter for preconception consultation      Dose: 1 mg  Take 1 tablet (1 mg) by mouth daily  Quantity: 90 tablet  Refills: 3     hydrocortisone 2.5 % cream  Used for: Dermatitis      Apply twice daily for 1 week, then 3 times weekly for 1 week, then repeat  Quantity: 60 g  Refills: 1     ondansetron 8 MG ODT tab  Commonly known as: ZOFRAN ODT  Used for: Supervision of other normal pregnancy, antepartum, Hyperemesis gravidarum      Dose: 8 mg  Take 1 tablet (8 mg) by mouth every 8 hours as needed for nausea  Quantity: 30 tablet  Refills: 1     prenatal multivitamin w/iron 27-0.8 MG tablet  Used for: Missed menses      Dose: 1 tablet  Take 1 tablet by mouth daily  Quantity: 90 tablet  Refills:  3     tacrolimus 0.1 % external ointment  Commonly known as: PROTOPIC  Used for: Atopic dermatitis, unspecified type      Apply twice daily as needed for rash on the trunk, extremities, neck, and ears.  Quantity: 60 g  Refills: 11     tretinoin 0.025 % external cream  Commonly known as: RETIN-A  Used for: Acne vulgaris      Apple a thin layer to bilateral cheeks at nighttime before bed  Quantity: 45 g  Refills: 11     * triamcinolone 0.025 % cream  Commonly known as: KENALOG  Used for: Atopic dermatitis, unspecified type      Apply twice daily as needed for rash on trunk, extremities, neck, ears for up to two weeks at a time  Quantity: 80 g  Refills: 11     * triamcinolone 0.025 % external ointment  Commonly known as: KENALOG  Used for: Rash      Apply topically 2 times daily  Quantity: 15 g  Refills: 3           * This list has 2 medication(s) that are the same as other medications prescribed for you. Read the directions carefully, and ask your doctor or other care provider to review them with you.                STOP taking      cyclobenzaprine 5 MG tablet  Commonly known as: FLEXERIL                  Where to get your medicines        These medications were sent to [facility] [place], [place] - [address]  [address] [place] [address], [facility] [address]      Phone: [phone]   acetaminophen 325 MG tablet  ibuprofen 600 MG tablet  senna-docusate 8.6-50 MG tablet               Consultations:   NICU  Lactation          Brief History of Admission and Labor:   Robert Anand is a 33 year old now  who presented at [facility] for early labor. Was recommended to deliver here for bilateral renal hydronephrosis and NICU care.     Labor course:  1. IUP at 38+4  2. Labor: spontaneous  3. ROM: SROM at time of delivery  4. GBS status: +  5. Anesthesia: none  6. Episiotomy: none  7. Infant: Viable male infant with spontaneous cry.  Wt 3270g and apgars 9 and 9.  8. Placenta: Spontaneous with controlled  traction on the cord, intact, 3 vessel cord. Pitocin given after placental delivery.  9. Lacerations: 1st degree laceration repaired after infiltration with lidocaine using 3.0 suture.  10. Complications: none  11. EBL: 100  12 Postpartum Condition: Mother stable to post partum unit, infant remained with mom for transition.          Postpartum Hospital Course:   The patient's postpartum course was unremarkable. She had a couple mild range BP, but largely normotensive and did not require antihypertensives. On discharge, her pain was well controlled. Vaginal bleeding is similar to peak menstrual flow. Voiding without difficulty.  Ambulating well and tolerating a normal diet.  No fever.  Breastfeeding well.  Infant is stable.  She was discharged on post-partum day #2.    Post-partum hemoglobin: 11.6    Contraception: will follow up with primary OB    Rhogam was not indicated          Discharge Instructions and Follow-Up:     Discharge diet: Regular   Discharge activity: Activity as tolerated   Discharge follow-up: Follow up with your primary OBGYN provider in six weeks for a routine postpartum visit   3-5 day BP check   Wound care: Drink plenty of fluids  Ice to area for comfort              Discharge Disposition:     Discharged to home        Gordo Patel DO, MA  N OBGYN- PGY2  6:55 AM December 1, 2023     I saw and evaluated this patient prior to discharge. I discussed the patient with the resident and agree with the presentation, hospital details and plan of care as documented in the discharge summary with edits by me.   I personally spent 15 minutes on discharge activities.  Sophie Jay MD

## 2023-11-29 NOTE — PLAN OF CARE
Pt arrived from home after appt in clinic today.  Sent from clinic to L&D for direct admit for induction.  Pt reports madisyn every 10 min.  UC's palpate mild.  Here with  and 3 children.  Monitors applied. FHT's 120's.

## 2023-11-29 NOTE — L&D DELIVERY NOTE
Vaginal Delivery Summary  MRN: [id number]    DOS: 11/28/2023      1. IUP at 38+4  2. Labor: spontaneous  3. ROM: SROM at time of delivery  4. GBS status: +  5. Anesthesia: none  6. Episiotomy: none  7. Infant: Viable male infant with spontaneous cry.  Wt and apgars pending.  8. Placenta: Spontaneous with controlled traction on the cord, intact, 3 vessel cord. Pitocin given after placental delivery.  9. Lacerations: 1st degree laceration repaired after infiltration with lidocaine using 3.0 suture.  10. Complications: none  11. EBL: 100  12 Postpartum Condition: Mother stable to post partum unit, infant remained with mom for transition.    Jen Garcia MD, FACOG  (she/her/hers)

## 2023-11-29 NOTE — H&P
[facility] Labor and Delivery History and Physical    Robert Anand MRN# [id number]   Age: 33 year old YOB: 1990     Date of Admission:  2023    Primary care provider: Dayami Ramirez           Chief Complaint:   Robert Anand is a 33 year old female who is 38w4d at 38+4 wks, here in early labor.  She is delivering here d/t bilateral renal hydronephrosis and recs for NICU to be available for delivery.          Pregnancy history:     OBSTETRIC HISTORY:    OB History    Para Term  AB Living   4 3 3 0 0 3   SAB IAB Ectopic Multiple Live Births   0 0 0 0 3      # Outcome Date GA Lbr Nabele/2nd Weight Sex Delivery Anes PTL Lv   4 Current            3 Term 19 38w3d  3.147 kg (6 lb 15 oz) M  None N HENRY      Complications: Precipitous delivery   2 Term 18 38w3d  2.86 kg (6 lb 4.9 oz) F Vag-Spont None N HENRY      Name: MIC,BABY GIRL      Apgar1: 8  Apgar5: 9   1 Term 14   3.8 kg (8 lb 6 oz) M Vag-Spont   HENRY     EDC: Estimated Date of Delivery: 23    Prenatal Labs:   Lab Results   Component Value Date    ABO A 2019    RH Pos 2019    AS Negative 2023    HEPBANG Nonreactive 2019    CHPCRT Negative 2023    GCPCRT Negative 2023    TREPAB Negative 2017    HGB 11.5 (L) 2023     GBS Status:   Lab Results   Component Value Date    GBS Negative 2019     Active Problem List  Patient Active Problem List   Diagnosis    Normal pregnancy in third trimester    GBS (group B Streptococcus carrier), +RV culture, currently pregnant    Term pregnancy       Medication Prior to Admission  Medications Prior to Admission   Medication Sig Dispense Refill Last Dose    cyclobenzaprine (FLEXERIL) 5 MG tablet Take 1 tablet (5 mg) by mouth nightly as needed for muscle spasms 30 tablet 5 More than a month    ferrous sulfate (FEROSUL) 325 (65 Fe) MG tablet Take 1 tablet (325 mg) by mouth daily (with breakfast)  60 tablet 3 More than a month    Ferrous Sulfate (IRON) 325 (65 Fe) MG tablet Take 1 tablet by mouth daily 120 tablet 3 More than a month    fluocinonide (LIDEX) 0.05 % external cream Apply twice daily for up to to one week for rash on the neck 30 g 11 More than a month    folic acid (FOLVITE) 1 MG tablet Take 1 tablet (1 mg) by mouth daily 90 tablet 3 More than a month    hydrocortisone 2.5 % cream Apply twice daily for 1 week, then 3 times weekly for 1 week, then repeat 60 g 1 More than a month    ibuprofen (ADVIL/MOTRIN) 600 MG tablet Take 1 tablet (600 mg) by mouth every 6 hours as needed for moderate pain 60 tablet 0 More than a month    ondansetron (ZOFRAN ODT) 8 MG ODT tab Take 1 tablet (8 mg) by mouth every 8 hours as needed for nausea 30 tablet 1 More than a month    Prenatal Vit-Fe Fumarate-FA (PRENATAL MULTIVITAMIN W/IRON) 27-0.8 MG tablet Take 1 tablet by mouth daily 90 tablet 3 More than a month    tacrolimus (PROTOPIC) 0.1 % external ointment Apply twice daily as needed for rash on the trunk, extremities, neck, and ears. 60 g 11 More than a month    tretinoin (RETIN-A) 0.025 % external cream Apple a thin layer to bilateral cheeks at nighttime before bed 45 g 11 More than a month    triamcinolone (KENALOG) 0.025 % cream Apply twice daily as needed for rash on trunk, extremities, neck, ears for up to two weeks at a time 80 g 11 More than a month    triamcinolone (KENALOG) 0.025 % external ointment Apply topically 2 times daily 15 g 3 More than a month   .        Maternal Past Medical History:   History reviewed. No pertinent past medical history.                    Family History:   This patient has no significant family history            Social History:   This patient has no significant social history         Review of Systems:   CONSTITUTIONAL: NEGATIVE for fever, chills, change in weight  ENT/MOUTH: NEGATIVE for ear, mouth and throat problems  RESP: NEGATIVE for significant cough or SOB  CV: NEGATIVE  for chest pain, palpitations or peripheral edema          Physical Exam:   Vitals were reviewed  Patient Vitals for the past 12 hrs:   BP Temp Temp src Pulse Resp   23 2144 128/82 98.6  F (37  C) Oral 87 20   23 1838 -- 98.4  F (36.9  C) Oral 78 20   23 1835 133/77 -- -- -- --     Constitutional:   awake, alert, cooperative, no apparent distress, and appears stated age     Lungs:   No increased work of breathing, good air exchange, clear to auscultation bilaterally, no crackles or wheezing     Cardiovascular:   Well perfused and no edema     Cervix:   Membranes: intact   Dilation: 4   Effacement: 70%   Station:-2   Consistency: soft   Position: Mid  Presentation:Cephalic  Fetal Heart Rate Tracing: reactive and reassuring  Tocometer: frequency q 2-4 minutes                       Assessment:   Robert Anand is a 38w4d in early labor.            Plan:   Admit - see IP orders, pitocin for augmentation. PCN for GBS  Anticipate     Jen Garcia MD

## 2023-11-29 NOTE — PLAN OF CARE
Pt's  Michael, plans to be with their children in the family waiting room during delivery.  Pt POC discussed and would like to labor un-medicated.  Options discussed with her and her .  Questions answered regarding options.  18g IV placed in left wrist with labs drawn.  FHT's 130's.  Please see labor record for further details.  HX of precipitous delivery.  Delivery table set up. Report given to DAVIAN Patrick who assumed care of pt.  Currently pt is resting and breathing through contractions.

## 2023-11-29 NOTE — DISCHARGE INSTRUCTIONS
Postpartum Vaginal Delivery Instructions    Activity     Ask family and friends for help when you need it.  Do not place anything in your vagina for 6 weeks.  You are not restricted on other activities, but take it easy for a few weeks to allow your body to recover from delivery.  You are able to do any activities you feel up to that point.  No driving until you have stopped taking your pain medications (usually two weeks after delivery).     Call your health care provider if you have any of these symptoms:     Increased pain, swelling, redness, or fluid around your stiches from an episiotomy or perineal tear.  A fever above 100.4 F (38 C) with or without chills when placing a thermometer under your tongue.  You soak a sanitary pad with blood within 1 hour, or you see blood clots larger than a golf ball.  Bleeding that lasts more than 6 weeks.  Vaginal discharge that smells bad.  Severe pain, cramping or tenderness in your lower belly area.  A need to urinate more frequently (use the toilet more often), more urgently (use the toilet very quickly), or it burns when you urinate.  Nausea and vomiting.  Redness, swelling or pain around a vein in your leg.  Problems breastfeeding or a red or painful area on your breast.  Chest pain and cough or are gasping for air.  Problems coping with sadness, anxiety, or depression.  If you have any concerns about hurting yourself or the baby, call your provider immediately.   You have questions or concerns after you return home.     Keep your hands clean:  Always wash your hands before touching your perineal area and stitches.  This helps reduce your risk of infection.  If your hands aren't dirty, you may use an alcohol hand-rub to clean your hands. Keep your nails clean and short.        Warning Signs after Having a Baby    Keep this paper on your fridge or somewhere else where you can see it.    Call your provider if you have any of these symptoms up to 12 weeks after having your  baby.    Thoughts of hurting yourself or your baby  Pain in your chest or trouble breathing  Severe headache not helped by pain medicine  Eyesight concerns (blurry vision, seeing spots or flashes of light, other changes to eyesight)  Fainting, shaking or other signs of a seizure    Call 9-1-1 if you feel that it is an emergency.     The symptoms below can happen to anyone after giving birth. They can be very serious. Call your provider if you have any of these warning signs.    My provider s phone number: _______________________    Losing too much blood (hemorrhage)    Call your provider if you soak through a pad in less than an hour or pass blood clots bigger than a golf ball. These may be signs that you are bleeding too much.    Blood clots in the legs or lungs    After you give birth, your body naturally clots its blood to help prevent blood loss. Sometimes this increased clotting can happen in other areas of the body, like the legs or lungs. This can block your blood flow and be very dangerous.     Call your provider if you:  Have a red, swollen spot on the back of your leg that is warm or painful when you touch it.   Are coughing up blood.     Infection    Call your provider if you have any of these symptoms:  Fever of 100.4 F (38 C) or higher.  Pain or redness around your stitches if you had an incision.   Any yellow, white, or green fluid coming from places where you had stitches or surgery.    Mood Problems (postpartum depression)    Many people feel sad or have mood changes after having a baby. But for some people, these mood swings are worse.     Call your provider right away if you feel so anxious or nervous that you can't care for yourself or your baby.    Preeclampsia (high blood pressure)    Even if you didn't have high blood pressure when you were pregnant, you are at risk for the high blood pressure disease called preeclampsia. This risk can last up to 12 weeks after giving birth.     Call your  provider if you have:   Pain on your right side under your rib cage  Sudden swelling in the hands and face    Remember: You know your body. If something doesn't feel right, get medical help.     For informational purposes only. Not to replace the advice of your health care provider. Copyright 2020 [facility]. All rights reserved. Clinically reviewed by Helen Rider RNC-OB, MSN. [employer] [id number] - Rev 02/23.    Checking Your Blood Pressure at Home  During and after pregnancy  How do I measure my blood pressure?  It s important to take the readings at the same time each day, such as morning and evening. Take your blood pressure before taking any morning medications.  How to get the most accurate reading  30 minutes before checking your blood pressure, avoid the following:  Drinking caffeine  Drinking alcohol  Eating  Smoking  Exercising  5 minutes before checking your blood pressure:  Use the bathroom and urinate so you have an empty bladder.  Sit still in a chair for around 5 minutes. Stay calm and relaxed and do not talk if possible.  To check your blood pressure:     Sit up straight in a chair.  Place your feet on the floor. Don t cross your ankles or legs.  Rest your arm at the level of your heart on a table or desk or on the arm of a chair. Use the same arm every day.  Pull up your shirt sleeve. Don t take the measurement over clothes.  Wrap the blood pressure cuff around the upper part of your left arm, 1 inch (2.5 cm) above your elbow.  Fit the cuff snugly around your arm. You should be able to place only one finger between the cuff and your arm.  Position the cord so that it rests in the bend of your elbow.  Press the power button.  Sit quietly while the cuff inflates and deflates.  Read the digital reading on the monitor screen and write the numbers down (record them) in a notebook.  Wait 2-3 minutes, then repeat the steps, starting at step 1.  Which features do you need?  Arm cuff monitors  "give the most exact readings.  Wrist and finger blood pressure monitors are often less exact.  Pick a blood pressure monitor that has passed tests to show they measure exactly. Blood pressure cuffs for sale in the [place] that have passed tests are listed on the website www.validatebp.org.  Some monitors that have passed tests are:  Omron 3 Series Upper Arm Blood Pressure Monitor (Model XZ7589)  Omron 5 Series Upper Arm Blood Pressure Monitor (Model VI1981)  Omron 7 Series Upper Arm Blood Pressure Monitor (Model HEM-7320)  [employer] Upper Arm Blood Pressure Monitor with Talking Function (UA 1030T)  Don t use smartphone apps. There are many smartphone apps that claim to check your blood pressure using the pulse in your wrist or finger. These don t work. They haven t passed any tests. Don t give your clinic a blood pressure reading from a smartphone mirna.  If you have a flexible spending account (FSA) or health savings account (HSA), you may wish to pay yourself back (reimburse) for the machine and cuff. A blood pressure monitor is an allowed over-the- counter (OTC) item to pay yourself back from these accounts.  Cuff size  The size of the arm cuff is a key feature. Make sure the cuff is the right size for your arm. If the cuff isn t the right size, readings will either be too high or low.  To know what size cuff to buy, measure the distance around your bicep (upper arm).  Use a flexible measuring tape or . Place the measuring tape [facility] between your armpit and elbow. Measure the distance around your arm in inches.  You may need to buy a cuff apart from the machine to get the right size.  Cuff sizes and arm measurements  Small adult: 22 to 26 cm (8.7 to 10.2 inches)  Adult: 27 to 34 cm (10.6 to 13.4 inches)  Large adult: 35 to 44 cm (13.8 to 17.3 inches)  Adult thigh: 45 to 52 cm (17.7 to 20.5 inches)    Copyright statement\" content=\"For informational purposes only. Not to replace the advice of your health " "care provider. Photo: ID [id number]   [employer]  [employer]. Text copyright   2023 [facility]. All rights reserved. Clinically reviewed by [facility]. [employer] [id number] - REV 03/23.    Know Your Blood Pressure Numbers  For patients who've had a high blood pressure disorder of pregnancy  What to know about high blood pressure disorders of pregnancy  People who had high blood pressure during pregnancy may continue to have high blood pressure for up to 12 weeks after pregnancy. It can also raise your lifetime risk of chronic high blood pressure, heart disease and blood vessel disease. It is vital that you keep monitoring your blood pressure and taking steps to control it.   The general guidelines below are what your blood pressures mean.  A heart healthy lifestyle that includes blood pressure control can help reduce these risks. A good blood pressure goal is less than 130/80 mmHg long-term.  Talk to your provider about high blood pressure disorder of pregnancy and what this means for your lifelong health.   Know your numbers  Read \"Checking Your Blood Pressure at Home\" to learn the best way to take your blood pressure.  Refer to the next page for general guidelines about what your blood pressures mean and what to do about them. Follow these instructions unless your provider tells you something different.  For more resources, visit www.preeclampsia.org.  What to do if your blood pressure is high  Act right away if you have numbers in the yellow or red range--don't wait for a scheduled appointment.  When to call your provider  Regardless of your blood pressure, call your healthcare provider right away if you develop any of these symptoms:  Severe headache  Chest pain  Trouble breathing  Stomach pain  Changes in vision  Swelling in your hands and face.  Please say, \"I am having symptoms of high blood pressure. My provider told me to call and ask to be seen right away when I have " "these symptoms.\"  If you ARE pregnant OR   it has been less than 12 weeks since you delivered  Systolic pressure (top number) is....  Diastolic (bottom number) is.... Your blood pressure is....   160 or higher  or 110 or higher VERY HIGH. Check it again in 10 minutes, then contact your provider.   140 - 159  or 90 - 109 HIGH. Keep checking blood pressure 2 times a day. If your blood pressure is in this range for 2 readings, contact your provider within 24 hours. We will discuss starting or increasing your blood pressure medicine.   100 - 139  and 60 - 89 NORMAL. Your blood pressure looks great!   Keep checking it 2 times a day.   Less than 100  or Less than 60 LOW. Check your blood pressure again in   10 minutes, then contact your provider. We may need to make changes to your blood pressure medicine.     Call your provider right away if you have these symptoms: a severe headache, vision changes, shortness of breath, chest pain, or right upper belly pain. Call even if your blood pressure is okay.  Call 9-1-1 if you feel the symptoms are severe and that it is an emergency.   If you are NOT pregnant OR   it has been more than 12 weeks since you delivered  Systolic pressure (top number) is....  Diastolic (bottom number) is.... Your blood pressure is....   Higher than 180 and/or Higher than 120 Hypertensive crisis. Call your doctor right away.   140 or higher or  90 or higher Hypertension Stage 2. Follow up with your provider.   130-139 or Less than 80 Hypertension Stage 1. Follow up with your provider.   120-129 and Less than 80 Elevated blood pressure (pre-hypertension). You are at higher risk of developing high blood pressure. Follow up with your provider.   Less than 120 and Less than 80 Normal.     Call your provider right away if you have these symptoms: a severe headache, vision changes, shortness of breath, chest pain, or right upper belly pain. Call even if your blood pressure is okay.  Call 9-1-1 if you feel " the symptoms are severe and that it is an emergency.   For informational purposes only. Not to replace the advice of your health care provider. Copyright   2023 [facility]. All rights reserved. Clinically reviewed by [facility]. [employer] [id number] - 03/23.

## 2023-11-29 NOTE — PROGRESS NOTES
Patient arrived to [facility] unit via wheelchair at 0030 ,with belongings, accompanied by family, with infant in arms. Received report from  DAVIAN Patrick  and checked bands. Unit and room orientation completd. Call light given; no concerns present at this time. Continue with plan of care.

## 2023-11-29 NOTE — PLAN OF CARE
Patient here for IOL for fetal pyelectasis and hydronephrosis. Patient's  and kids present and supportive at bedside. Pitocin was started and patient started to feel like she had to push shortly after. Dr. Garcia called to bedside and patient found to be 8cm. Patient then started to push and was found to be complete with a BBOW. Patient SROM'd and delivered a baby boy at 2206, Apgars 9/9. 1st degree laceration that was repaired. Delivery  mL. Patient has had mild range BPs, otherwise all other VSS. EFM as charted. Anticipate transfer up to [facility]. Continue with plan of care.

## 2023-11-29 NOTE — PROGRESS NOTES
Post Partum Progress Note    Subjective:  She is resting comfortably in bed this morning without complaints. Has noticed that she has some hardness in her lower abdomen. Pain is well controlled on current medication regimen. Lochia present and appropriate.  She is voiding without difficulty. She is ambulating without dizziness or difficulty.  She denies headache, changes in vision, nausea/vomiting, chest pain, shortness of breath, RUQ pain, or worsening edema.  She is breastfeeding.     Objective:  Vitals:    23 2357 23 0013 23 0033 23 0415   BP: 136/78 135/84 (!) 138/90 121/78   BP Location:   Right arm Left arm   Patient Position:   Semi-Yi's Semi-Yi's   Cuff Size:   Adult Regular Adult Regular   Pulse:   74 73   Resp:   18 17   Temp:   99  F (37.2  C) 99.1  F (37.3  C)   TempSrc:   Oral Oral     General: NAD, resting comfortably  CV: warm, well perfused  Pulm: normal respiratory effort  Abd: soft, non-tender, non-distended. Fundus is firm and palpable below the umbilicus.    Ext: trace lower extremity edema bilaterally. No calf tenderness.    Assessment/Plan:  Robert Anand is a 33 year old now  who is PPD#1 , delivery indicated at [facility] for fetal indications and NICU cares. In the postpartum period, she has had some elevated blood pressures. Otherwise doing well and starting to meet goals.     # Elevated BP <4 hr  -Serial BP monitoring, HELLP labs and antihypertensives prn     # Postpartum care  - PNC: Rh positive. Rubella immune. No intervention indicated.  - Pain: well controlled on PO medications  - Heme: Hgb 11.5 > > AM Hgb pending.  If <10 will discharge home with iron.  - GI: continue anti-emetics and stool softeners as needed  - : voiding spontaneously   - Feeding: breast and formula   - BC: Unsure, can continue to offer options while in house     Dispo: Anticipate discharge to home on PPD#1-2 pending clinical course and blood pressures.    Shania Plummer  MD Jean MPH  OB/Gyn Resident PGY-3  11/29/2023  6:34 AM    Staff MD Note    I appreciate the note by Dr. Kavitha Pena.  Any necessary changes have been made by me.  I saw and evaluated the patient and agree with the findings and plan of care as documented in the note.    Elly Chapin MD

## 2023-11-30 PROCEDURE — 250N000013 HC RX MED GY IP 250 OP 250 PS 637

## 2023-11-30 PROCEDURE — 120N000002 HC R&B MED SURG/OB UMMC

## 2023-11-30 PROCEDURE — 99232 SBSQ HOSP IP/OBS MODERATE 35: CPT | Mod: GC | Performed by: OBSTETRICS & GYNECOLOGY

## 2023-11-30 RX ORDER — ACETAMINOPHEN 325 MG/1
650 TABLET ORAL EVERY 6 HOURS PRN
Qty: 30 TABLET | Refills: 0 | Status: SHIPPED | OUTPATIENT
Start: 2023-11-30

## 2023-11-30 RX ORDER — IBUPROFEN 600 MG/1
600 TABLET, FILM COATED ORAL EVERY 6 HOURS PRN
Qty: 30 TABLET | Refills: 0 | Status: SHIPPED | OUTPATIENT
Start: 2023-11-30

## 2023-11-30 RX ORDER — AMOXICILLIN 250 MG
1 CAPSULE ORAL DAILY
Qty: 100 TABLET | Refills: 0 | Status: SHIPPED | OUTPATIENT
Start: 2023-11-30

## 2023-11-30 RX ADMIN — DOCUSATE SODIUM 100 MG: 100 CAPSULE, LIQUID FILLED ORAL at 08:47

## 2023-11-30 RX ADMIN — ACETAMINOPHEN 650 MG: 325 TABLET, FILM COATED ORAL at 08:47

## 2023-11-30 RX ADMIN — ACETAMINOPHEN 650 MG: 325 TABLET, FILM COATED ORAL at 03:49

## 2023-11-30 RX ADMIN — ACETAMINOPHEN 650 MG: 325 TABLET, FILM COATED ORAL at 18:12

## 2023-11-30 RX ADMIN — IBUPROFEN 800 MG: 800 TABLET, FILM COATED ORAL at 11:15

## 2023-11-30 RX ADMIN — IBUPROFEN 800 MG: 800 TABLET, FILM COATED ORAL at 18:11

## 2023-11-30 RX ADMIN — ACETAMINOPHEN 650 MG: 325 TABLET, FILM COATED ORAL at 12:15

## 2023-11-30 RX ADMIN — IBUPROFEN 800 MG: 800 TABLET, FILM COATED ORAL at 05:23

## 2023-11-30 RX ADMIN — ACETAMINOPHEN 650 MG: 325 TABLET, FILM COATED ORAL at 23:35

## 2023-11-30 ASSESSMENT — ACTIVITIES OF DAILY LIVING (ADL)
ADLS_ACUITY_SCORE: 18

## 2023-11-30 NOTE — PLAN OF CARE
Data: Vital signs within normal limits. Postpartum checks within normal limits - see flow record. Patient eating and drinking normally. Patient able to empty bladder independently and is up ambulating. No apparent signs of infection. healing well. Patient performing self cares and is able to care for infant.  Action: Patient medicated during the shift for pain and cramping. See MAR. Patient reassessed within 1 hour after each medication and pain was improved - patient stated she was comfortable. Patient education done about plan of care. See flow record.  Response: Positive attachment behaviors observed with infant.    Plan: Anticipate discharge on 12/01/23.

## 2023-11-30 NOTE — PROGRESS NOTES
Post Partum Progress Note    Subjective:  She is resting comfortably in bed this morning without complaints. She still has some cramping with breastfeeding. Pain is well controlled on current medication regimen. Lochia present and appropriate.  She is voiding without difficulty. She is ambulating without dizziness or difficulty.  She denies headache, changes in vision, nausea/vomiting, chest pain, shortness of breath, RUQ pain, or worsening edema.      Plan is for ultrasound for baby today.     Objective:  Vitals:    23 1536 23 1914 23 2332 23 0350   BP: 122/79 122/78 119/83 128/72   BP Location: Left arm Right arm Left arm Left arm   Patient Position: Semi-Yi's Semi-Yi's Semi-Yi's Semi-Yi's   Cuff Size: Adult Regular Adult Regular Adult Regular Adult Regular   Pulse: 85 72 81 94   Resp: 16 16 17 16   Temp: 99.1  F (37.3  C) 98  F (36.7  C) 97.9  F (36.6  C)    TempSrc: Oral Oral Oral      General: NAD, resting comfortably  CV: warm, well perfused  Pulm: normal respiratory effort  Abd: soft, non-tender, non-distended. Fundus is firm and palpable below the umbilicus.    Ext: trace lower extremity edema bilaterally. No calf tenderness.    Assessment/Plan:  Robert Anand is a 33 year old now  who is PPD#2 , delivery indicated at [facility] for fetal indications and NICU cares. In the postpartum period, she has had some elevated blood pressures. Otherwise doing well and starting to meet goals.     # Elevated BP <4 hr  -Serial BP monitoring, HELLP labs and antihypertensives prn     # Postpartum care  - PNC: Rh positive. Rubella immune. No intervention indicated.  - Pain: well controlled on PO medications  - Heme: Hgb 11.5 > > 11.6.  If <10 will discharge home with iron.  - GI: continue anti-emetics and stool softeners as needed  - : voiding spontaneously   - Feeding: breast and formula   - BC: follow up at 6 weeks     Dispo: Anticipate discharge to home on PPD#1-2  pending clinical course and blood pressures.    Shania Pena MD MPH  OB/Gyn Resident PGY-3  11/30/2023  6:37 AM    BP (!) 124/91 (BP Location: Left arm, Patient Position: Sitting, Cuff Size: Adult Regular)   Pulse 87   Temp 98.8  F (37.1  C) (Oral)   Resp 16   Wt 64 kg (141 lb 1.5 oz)   LMP 01/08/2023   Breastfeeding Unknown   BMI 23.97 kg/m    Hemoglobin   Date Value Ref Range Status   11/29/2023 11.6 (L) 11.7 - 15.7 g/dL Final     The patient was seen and examined by me separately from the team.  I have reviewed and agree with the above note.  She is doing well today with no concerns, ready for discharge.  The baby has imaging pending yet today and needs to stay for 48h for h/o inadequately treated GBS.  Will monitor blood pressure this morning with possible discharge this afternoon with home BP cuff and home care visit for BP check this weekend vs clinic visit tomorrow with her primary OB at [place].       Lanette Mederos MD, FACOG

## 2023-11-30 NOTE — LACTATION NOTE
This note was copied from a baby's chart.  Consult for:  Lactation review breastfeeding goals . Note, French  was used with iPad. Robert always says her milk comes in around 2 days. Discussed pumping with use of supplements and Robert declined, stated she did not like pumping. Education provided including importance of pumping to protect supply if infant is getting consecutive supplementation bottles. Also encouraged her to use her insurance if she desires a pump after discharge if she doesn't get one at discharge of this current hospital stay.    Infant's Primary Care Clinic: [facility]    Delivery Information:  infant was born at Gestational Age: 38w4d via vaginal delivery on 11/28/2023 10:06 PM     Maternal Health History:  (NOTE - see maternal data and prenatal history report to review, select from baby index report), Maternal past medical history, problem list and prior to admission medications reviewed and unremarkable., Maternal past medical history, problem list and prior to admission medications reviewed and notable for +GBS not treated   Information for the patient's mother:  Robert Anand [[id number]]   History reviewed. No pertinent past medical history. ,   Information for the patient's mother:  Piotr Anandaxel WISE [[id number]]     Patient Active Problem List   Diagnosis    Normal pregnancy in third trimester    GBS (group B Streptococcus carrier), +RV culture, currently pregnant    Term pregnancy    ,   Information for the patient's mother:  Robert Anand [[id number]]     Medications Prior to Admission   Medication Sig Dispense Refill Last Dose    cyclobenzaprine (FLEXERIL) 5 MG tablet Take 1 tablet (5 mg) by mouth nightly as needed for muscle spasms 30 tablet 5 More than a month    ferrous sulfate (FEROSUL) 325 (65 Fe) MG tablet Take 1 tablet (325 mg) by mouth daily (with breakfast) 60 tablet 3 More than a month    Ferrous Sulfate (IRON) 325 (65 Fe) MG tablet  Take 1 tablet by mouth daily 120 tablet 3 More than a month    fluocinonide (LIDEX) 0.05 % external cream Apply twice daily for up to to one week for rash on the neck 30 g 11 More than a month    folic acid (FOLVITE) 1 MG tablet Take 1 tablet (1 mg) by mouth daily 90 tablet 3 More than a month    hydrocortisone 2.5 % cream Apply twice daily for 1 week, then 3 times weekly for 1 week, then repeat 60 g 1 More than a month    ibuprofen (ADVIL/MOTRIN) 600 MG tablet Take 1 tablet (600 mg) by mouth every 6 hours as needed for moderate pain 60 tablet 0 More than a month    ondansetron (ZOFRAN ODT) 8 MG ODT tab Take 1 tablet (8 mg) by mouth every 8 hours as needed for nausea 30 tablet 1 More than a month    Prenatal Vit-Fe Fumarate-FA (PRENATAL MULTIVITAMIN W/IRON) 27-0.8 MG tablet Take 1 tablet by mouth daily 90 tablet 3 More than a month    tacrolimus (PROTOPIC) 0.1 % external ointment Apply twice daily as needed for rash on the trunk, extremities, neck, and ears. 60 g 11 More than a month    tretinoin (RETIN-A) 0.025 % external cream Apple a thin layer to bilateral cheeks at nighttime before bed 45 g 11 More than a month    triamcinolone (KENALOG) 0.025 % cream Apply twice daily as needed for rash on trunk, extremities, neck, ears for up to two weeks at a time 80 g 11 More than a month    triamcinolone (KENALOG) 0.025 % external ointment Apply topically 2 times daily 15 g 3 More than a month    , Maternal complications: None      Maternal Breast Exam/Breastfeeding History:  Robert noted breast growth and sensitivity in early pregnancy. She denies any history of breast/chest injury or surgery. Her breasts are soft and symmetrical with bilateral intact, everted nipples. She has been able to hand express colostrum. ?    Infant information: infant was AGA at birth and has age appropriate output and weight loss.      Weight Change Since Birth: 0% at 1 day old     Oral exam of baby:  Infant has normal jaw, and is able to  latch adequately to breast.     Feeding History: Breastfeeding with previous 3 children, no issues, first child was fed over 1 year, last child was  for 3 months.    Feeding Assessment:  infant is latched nicely at breast, Robert is using the cradle hold, encouraged her to use skin to skin as needed to help keep her infant alert and more awake during feedings, especially if getting sleepy after first side.    Education:   - Expected  feeding patterns in the first few days (pg. 38 of Your Guide to To Postpartum and [place] Care)/ the Second Night  - Stages of milk production  - Benefits of hand expression of colostrum  - Early feeding cues     - Benefits of feeding on cue  - Benefits of skin to skin  - Breastfeeding positions  - Tips to get and maintain a deep latch  - Nutritive vs.non-nutritive sucking  - Gentle breast compressions as needed to enhance milk transfer  - How to tell when baby is finished  - How to tell if baby is getting enough  - Expected  output  - [place] weight loss  - Infant Feeding Log  - [facility] Breastfeeding/Pumping videos  - Signs breastfeeding is going well (comfortable latch, audible swallows, age appropriate output and weight loss)    - Tips to prevent engorgement  - Signs of engorgement  - Tips to manage engorgement  - Pumping recommendations (based on patient need)  - [facility] breast pump part/infant feeding supplies cleaning recommendations  - Inpatient breastfeeding support  - Outpatient lactation resources    Handouts: Infant Feeding Log (Week 1, Your Guide to Postpartum & [place] Care Book)    Home Breast Pump: declined to pump / did not want a pump for home (?)     Plan: Continue breastfeeding on cue with RN support as needed with a goal of 8-12 feedings per day.     Encourage frequent skin to skin and hand expression.     Encouraged follow up with outpatient lactation consultant  as needed after discharge. Family plans to follow up with [place]  [facility].        Diana Ramos RN, IBCLC   Lactation Consultant  Ascom: [phone]  Office: [phone]

## 2023-11-30 NOTE — LACTATION NOTE
This note was copied from a baby's chart.  Follow Up Consult    Maternal Assessment: Robert recently returned from walking along with baby to ultrasound,       Infant Assessment:  Age appropriate output (exceeds goals on feeding log) and weight loss, down 1.7% from birthweight at 24 hours of age.      Feeding History: Breastfeeding independently, mom says going well although reports some discomfort while feeding.       Feeding Assessment: Infant feeding on arrival though mom reports painful and he was held with head higher than nipple, chin pressed against chest & shallow, short jaw pulls.  With permission, demo ways to shape areola before latching (on opposite breast show example) and mom switched sides practicing different hold as well as aligning infant nose to nipple position before they latched. She report relief, denies pain with this position/latch and able to point out immediate difference in deeper, longer jaw pulls with nutritive sucking.     Education:   - Breastfeeding positions  - Tips to get and maintain a deeper latch (including holding him closer once deep latch achieved)  - Nutritive vs.non-nutritive sucking  - Gentle breast compressions as needed to enhance milk transfer  - How to tell if he is getting enough  - Expected  output  -  weight loss  - Infant Feeding Log  - Signs breastfeeding is going well (comfortable latch, audible swallows, age appropriate output and weight loss)    - Tips to manage engorgement  - Inpatient breastfeeding support  - Outpatient lactation resources    Encouraged follow up with outpatient lactation consultant  as needed after discharge. This is 4th baby so unlikely to need outpatient LC but did review the [facility] list on front and multiple resources on back of Lactation Resource handout. All education done with French  over iPad.    Handouts: Infant Feeding Log (Week 1, Your Guide to Postpartum & [place] Care Book) and [facility]  "Lactation Resources    Home Breast Pump: Mom declines pump saying \"I prefer to breastfeed him don't want to pump.\" Explained potential benefit of breast pump as needed if she had to be away from him for a feeding at some point, and that insurance usually will cover cost of a pump. She declined for now, informed she could call her OB clinic for a pump prescription at a later date if she changed her mind.     Plan: Continue feeding on cue at least 8 times daily, working on deeper latch and holding him close so not sliding off the breast over time.          Leann Sotelo RN, IBCLC   Lactation Consultant  Ascom: [phone]  Office: [phone]      "

## 2023-11-30 NOTE — PLAN OF CARE
Goal Outcome Evaluation:      Plan of Care Reviewed With: patient, spouse     VSS and post partum status WDL.  Patient taking Tylenol and Ibuprofen for pain control, also using warm packs. Fundus firm without massage at 1 cm below level of umbilicus, scant lochia. Patient independent with cares, ambulates in room, voids without difficulty and breastfeeds infant and supplementing with formula per request. Continue with plan of care.

## 2023-12-01 VITALS
TEMPERATURE: 98.2 F | RESPIRATION RATE: 16 BRPM | WEIGHT: 144 LBS | SYSTOLIC BLOOD PRESSURE: 131 MMHG | DIASTOLIC BLOOD PRESSURE: 83 MMHG | BODY MASS INDEX: 24.46 KG/M2 | HEART RATE: 90 BPM

## 2023-12-01 PROBLEM — O13.9 GESTATIONAL HYPERTENSION: Status: ACTIVE | Noted: 2023-12-01

## 2023-12-01 LAB
ALT SERPL W P-5'-P-CCNC: 19 U/L (ref 0–50)
AST SERPL W P-5'-P-CCNC: 30 U/L (ref 0–45)
CREAT SERPL-MCNC: 0.53 MG/DL (ref 0.51–0.95)
EGFRCR SERPLBLD CKD-EPI 2021: >90 ML/MIN/1.73M2
ERYTHROCYTE [DISTWIDTH] IN BLOOD BY AUTOMATED COUNT: 14 % (ref 10–15)
HCT VFR BLD AUTO: 35.1 % (ref 35–47)
HGB BLD-MCNC: 11.2 G/DL (ref 11.7–15.7)
MCH RBC QN AUTO: 29.2 PG (ref 26.5–33)
MCHC RBC AUTO-ENTMCNC: 31.9 G/DL (ref 31.5–36.5)
MCV RBC AUTO: 92 FL (ref 78–100)
PLATELET # BLD AUTO: 195 10E3/UL (ref 150–450)
RBC # BLD AUTO: 3.83 10E6/UL (ref 3.8–5.2)
WBC # BLD AUTO: 4.9 10E3/UL (ref 4–11)

## 2023-12-01 PROCEDURE — 0HQ9XZZ REPAIR PERINEUM SKIN, EXTERNAL APPROACH: ICD-10-PCS | Performed by: STUDENT IN AN ORGANIZED HEALTH CARE EDUCATION/TRAINING PROGRAM

## 2023-12-01 PROCEDURE — 99238 HOSP IP/OBS DSCHRG MGMT 30/<: CPT | Mod: GC | Performed by: STUDENT IN AN ORGANIZED HEALTH CARE EDUCATION/TRAINING PROGRAM

## 2023-12-01 PROCEDURE — 250N000013 HC RX MED GY IP 250 OP 250 PS 637

## 2023-12-01 PROCEDURE — 84460 ALANINE AMINO (ALT) (SGPT): CPT

## 2023-12-01 PROCEDURE — 82565 ASSAY OF CREATININE: CPT

## 2023-12-01 PROCEDURE — 85027 COMPLETE CBC AUTOMATED: CPT

## 2023-12-01 PROCEDURE — 36415 COLL VENOUS BLD VENIPUNCTURE: CPT

## 2023-12-01 PROCEDURE — 84450 TRANSFERASE (AST) (SGOT): CPT

## 2023-12-01 RX ADMIN — DOCUSATE SODIUM 100 MG: 100 CAPSULE, LIQUID FILLED ORAL at 07:42

## 2023-12-01 RX ADMIN — IBUPROFEN 800 MG: 800 TABLET, FILM COATED ORAL at 07:41

## 2023-12-01 RX ADMIN — ACETAMINOPHEN 650 MG: 325 TABLET, FILM COATED ORAL at 07:42

## 2023-12-01 RX ADMIN — ACETAMINOPHEN 650 MG: 325 TABLET, FILM COATED ORAL at 11:48

## 2023-12-01 ASSESSMENT — ACTIVITIES OF DAILY LIVING (ADL)
ADLS_ACUITY_SCORE: 18

## 2023-12-01 NOTE — PLAN OF CARE
Goal Outcome Evaluation:       VSS.HELLP labs WNL. Denies any s/s of preE. Postpartum assessment WNL. Breastfeeding independently. Pt declines needing a breast pump for home. C/o cramping with breastfeeding and some occasional perineal discomfort. Pain well managed with tylenol, ibuprofen, and use of heat pads and tucks. Blood pressure for home given and demonstrated for pt. Reviewed AVS and discharge education. Will discharge to home with pt once  arrives.

## 2023-12-01 NOTE — PROGRESS NOTES
Post Partum Progress Note    Subjective:  She is resting comfortably in bed this morning without complaints. She still has some cramping with breastfeeding. Pain is well controlled on current medication regimen. Lochia present and appropriate.  She is voiding without difficulty. She is ambulating without dizziness or difficulty.  She denies headache, changes in vision, nausea/vomiting, chest pain, shortness of breath, RUQ pain, or worsening edema.      Plan for additional imaging for baby today.     Objective:  Vitals:    23 1505 23 2325 23 0742 23 0900   BP: 114/74 135/80 131/83    BP Location: Left arm Left arm     Patient Position: Sitting Sitting     Cuff Size: Adult Regular Adult Regular     Pulse: 84 90     Resp: 16 16     Temp: 98.9  F (37.2  C) 99.3  F (37.4  C) 98.2  F (36.8  C)    TempSrc: Oral Oral Oral    Weight:    65.3 kg (144 lb)     General: NAD, resting comfortably  CV: warm, well perfused  Pulm: normal respiratory effort  Abd: soft, non-tender, non-distended. Fundus is firm and palpable below the umbilicus.    Ext: trace lower extremity edema bilaterally. No calf tenderness.    Assessment/Plan:  Robert Anand is a 33 year old now  who is PPD#3 , delivery indicated at [facility] for fetal indications and NICU cares. In the postpartum period, she has had some elevated blood pressures. Otherwise doing well and starting to meet goals.     # gHTN  -Serial BP monitoring. Blood pressures largely normal, has not required antihypertensives.  - AM HELLP labs normal     # Postpartum care  - PNC: Rh positive. Rubella immune. No intervention indicated.  - Pain: well controlled on PO medications  - Heme: Hgb 11.5 > > 11.6.  - GI: continue anti-emetics and stool softeners as needed  - : voiding spontaneously   - Feeding: breast and formula   - BC: follow up at 6 weeks     Dispo: Anticipate discharge to home today     Shania Pena MD MPH  OB/Gyn Resident  PGY-3  12/1/2023 6:51 AM    I personally examined and evaluated Robert Anand on 12/1/2023 with a Bangladeshi .  I discussed the patient with Dr. Kavitha Pena and agree with the presentation, exam and plan of care documented in this note with edits by me. Plan for discharge to home today. BP monitor ordered for discharge. Not a candidate for HOPE-BP trial due to language.   Sophie Jay MD

## 2024-02-15 ENCOUNTER — MEDICAL CORRESPONDENCE (OUTPATIENT)
Dept: HEALTH INFORMATION MANAGEMENT | Facility: CLINIC | Age: 34
End: 2024-02-15

## 2024-02-15 ENCOUNTER — VIRTUAL VISIT (OUTPATIENT)
Dept: DERMATOLOGY | Facility: CLINIC | Age: 34
End: 2024-02-15
Payer: COMMERCIAL

## 2024-02-15 DIAGNOSIS — L30.9 DERMATITIS: Primary | ICD-10-CM

## 2024-02-15 PROCEDURE — 99443 PR PHYSICIAN TELEPHONE EVALUATION 21-30 MIN: CPT | Performed by: DERMATOLOGY

## 2024-02-15 RX ORDER — EMOLLIENT BASE
CREAM (GRAM) TOPICAL
Qty: 454 G | Refills: 3 | Status: SHIPPED | OUTPATIENT
Start: 2024-02-15

## 2024-02-15 RX ORDER — FLUOCINOLONE ACETONIDE 0.11 MG/ML
OIL TOPICAL 2 TIMES DAILY
Qty: 118.28 ML | Refills: 3 | Status: SHIPPED | OUTPATIENT
Start: 2024-02-15 | End: 2024-02-15

## 2024-02-15 RX ORDER — FLUOCINOLONE ACETONIDE 0.11 MG/ML
OIL TOPICAL 2 TIMES DAILY
Qty: 118.28 ML | Refills: 6 | Status: SHIPPED | OUTPATIENT
Start: 2024-02-15

## 2024-02-15 NOTE — PROGRESS NOTES
[facility] Dermatology Note  Encounter Date: Feb 15, 2024  Photos and phone  [phone] for interpretor Romanian, number is [phone]    Start: 1:14pm reach patient then added Romanian interpretor  Stop: 1:37pm    Dermatology Problem List:  1. Dermatitis, bilateral neck.   - Current tx: lidex 0.05% cream BID x 1 week, then elidel cream thereafter  - Prior tx: triamcinolone 0.025% cream BID x 2 weeks, then Protopic ointment BID    SHX: needs Romanian interpretor  ____________________________________________     Assessment & Plan:     # Eczematous dermatitis, neck, arms, face with PIH. Patient is breastfeeding. I have never seen in person but  has. - last visit topicals adjusted. Dupixent was considered . Dr. Ortega referral for patch testing  - Lidex for up to 2 weeks per month on trunk and extemities  - ELIDEL BID for face   - Future consideration: Dupixent      # Acne vulgaris  -hold that   # Melasma.  -hold    Procedures Performed:    None    Follow-up: 3-6 months and also see Dr. Ortega     Staff:     Carolyn Gray MD    Department of Dermatology  [facility]: Phone: [phone], Fax:[phone]  [facility]: Phone: [phone], Fax: [phone]    ____________________________________________    CC: Derm Problem (Pt had to stop all medications due to pregnancy; pt reports no changes )    HPI:  Ms. Robert Anand is a(n) 34 year old female who presents today as a return patient for rash. Pt reports rash is worse.       Patient is otherwise feeling well, without additional skin concerns.    Labs Reviewed:       Physical Exam:  Vitals: LMP 01/08/2023   SKIN: Teledermatology photos were reviewed; image quality and interpretability:  acceptable. Image date: today.  - hyperpigmentation and scaliness on the photographed regions of the face and  extremities  - No other lesions of concern on areas examined.     Medications:  Current Outpatient Medications   Medication    acetaminophen (TYLENOL) 325 MG tablet    ferrous sulfate (FEROSUL) 325 (65 Fe) MG tablet    fluocinonide (LIDEX) 0.05 % external cream    folic acid (FOLVITE) 1 MG tablet    hydrocortisone 2.5 % cream    ibuprofen (ADVIL/MOTRIN) 600 MG tablet    ibuprofen (ADVIL/MOTRIN) 600 MG tablet    ondansetron (ZOFRAN ODT) 8 MG ODT tab    Prenatal Vit-Fe Fumarate-FA (PRENATAL MULTIVITAMIN W/IRON) 27-0.8 MG tablet    senna-docusate (SENOKOT-S/PERICOLACE) 8.6-50 MG tablet    tacrolimus (PROTOPIC) 0.1 % external ointment    tretinoin (RETIN-A) 0.025 % external cream    triamcinolone (KENALOG) 0.025 % cream    triamcinolone (KENALOG) 0.025 % external ointment     No current facility-administered medications for this visit.      Past Medical/Surgical History:   Patient Active Problem List   Diagnosis    Normal pregnancy in third trimester    GBS (group B Streptococcus carrier), +RV culture, currently pregnant    Term pregnancy    Gestational hypertension     No past medical history on file.    CC No referring provider defined for this encounter. on close of this encounter.

## 2024-02-15 NOTE — PATIENT INSTRUCTIONS
[facility] Dermatology Visit    Thank you for allowing us to participate in your care. Your findings, instructions and follow-up plan are as follows:    Call and schedule this  Patch Testing Referral:    Allergy/patch testing with Dr. Juve Ortega.      There is currently a 2-3 month wait for the initial consultation. If you qualify for patch testing then you will require a series of 3 appointments to complete the patch testing process.    Dermato-allergology clinics on the 4th floor:  [facility], [address] [place] [address], [address], [place], [place], [address]  Phone  Allergy is [phone]       Use derma-smoothe oil  for up to 2 weeks in a row per month    Future we can consider dupixent         When should I call my doctor?  If you are worsening or not improving, please, contact us or seek urgent care as noted below.     Who should I call with questions (adults)?  [facility] (adult and pediatric): [phone]  [facility] (adult): [phone]  For urgent needs outside of business hours call the [facility] at [phone] and ask for the dermatology resident on call  If this is a medical emergency and you are unable to reach an ER, Call [phone]    Who should I call with questions (pediatric)?  [facility]- Pediatric Dermatology  Dr. Paulette Davidson, Dr. Russel Bruce, Dr. Francie Ford, [facility]  Dr. Jeanne Gilbert, Dr. Tasha Wilburn & Dr. Ricki Cordova  Non Urgent  Nurse Triage Line; [phone]- Janell and Natalia MARCELO Care Coordinators   Nancy (/Complex ) [phone]    If you need a prescription refill, please contact your pharmacy. Refills are approved or denied by our physicians during normal business hours, Monday through Fridays  Per office policy, refills will not be granted if you have  not been seen within the past year (or sooner depending on your child's condition).    Scheduling Information:  Pediatric Appointment Scheduling and Call Center [phone]  Radiology Scheduling- [phone]  Sedation Unit Scheduling- [phone]  [place] Scheduling- General [phone]; Pediatric Dermatology [phone]  Main  Services: [phone]  Palauan: [phone]  Togolese: [phone]  Hmong/Torey/Jairo: [phone]  Preadmission Nursing Department Fax Number: [phone] (fax all pre-operative paperwork to this number)    For urgent matters arising during evenings, weekends, or holidays that cannot wait for normal business hours please call [phone] and ask for the dermatology resident on call to be paged.

## 2024-02-15 NOTE — NURSING NOTE
Teledermatology Nurse Call Patients:     Are you in the [facility] at the time of the encounter? yes    Today's visit will be billed to you and your insurance.    A teledermatology visit is not as thorough as an in-person visit and the quality of the photograph sent may not be of the same quality as that taken by the dermatology clinic.

## 2024-02-22 NOTE — NURSING NOTE
"Chief Complaint   Patient presents with     Prenatal Care     First OB 12.2       Initial LMP 08/27/2017 Estimated body mass index is 17.98 kg/(m^2) as calculated from the following:    Height as of 1/26/17: 5' 4.5\" (1.638 m).    Weight as of 10/20/17: 106 lb 6.4 oz (48.3 kg).  Medication Reconciliation: complete   Jeanne Apple LPN     " negative details… regular rate and rhythm/S1 S2 present/no gallops/no rub/no murmur/no JVD/vascular

## 2024-02-23 ENCOUNTER — MYC MEDICAL ADVICE (OUTPATIENT)
Dept: DERMATOLOGY | Facility: CLINIC | Age: 34
End: 2024-02-23
Payer: COMMERCIAL

## 2024-02-27 ENCOUNTER — PRENATAL OFFICE VISIT (OUTPATIENT)
Dept: OBGYN | Facility: CLINIC | Age: 34
End: 2024-02-27
Payer: COMMERCIAL

## 2024-02-27 VITALS
OXYGEN SATURATION: 100 % | WEIGHT: 146.9 LBS | BODY MASS INDEX: 24.96 KG/M2 | DIASTOLIC BLOOD PRESSURE: 97 MMHG | HEART RATE: 94 BPM | SYSTOLIC BLOOD PRESSURE: 154 MMHG

## 2024-02-27 DIAGNOSIS — R03.0 ELEVATED BLOOD PRESSURE READING WITHOUT DIAGNOSIS OF HYPERTENSION: ICD-10-CM

## 2024-02-27 DIAGNOSIS — Z01.411 ENCOUNTER FOR GYNECOLOGICAL EXAMINATION WITH ABNORMAL FINDING: Primary | ICD-10-CM

## 2024-02-27 LAB
ALBUMIN SERPL BCG-MCNC: 4.4 G/DL (ref 3.5–5.2)
ALP SERPL-CCNC: 58 U/L (ref 40–150)
ALT SERPL W P-5'-P-CCNC: 20 U/L (ref 0–50)
ANION GAP SERPL CALCULATED.3IONS-SCNC: 9 MMOL/L (ref 7–15)
AST SERPL W P-5'-P-CCNC: 29 U/L (ref 0–45)
BILIRUB SERPL-MCNC: 0.7 MG/DL
BUN SERPL-MCNC: 10.3 MG/DL (ref 6–20)
CALCIUM SERPL-MCNC: 9.1 MG/DL (ref 8.6–10)
CHLORIDE SERPL-SCNC: 103 MMOL/L (ref 98–107)
CREAT SERPL-MCNC: 0.65 MG/DL (ref 0.51–0.95)
DEPRECATED HCO3 PLAS-SCNC: 25 MMOL/L (ref 22–29)
EGFRCR SERPLBLD CKD-EPI 2021: >90 ML/MIN/1.73M2
ERYTHROCYTE [DISTWIDTH] IN BLOOD BY AUTOMATED COUNT: 14.6 % (ref 10–15)
GLUCOSE SERPL-MCNC: 89 MG/DL (ref 70–99)
HCG INTACT+B SERPL-ACNC: <1 MIU/ML
HCG UR QL: NEGATIVE
HCT VFR BLD AUTO: 39.4 %
HGB BLD-MCNC: 12.6 G/DL (ref 11.7–15.7)
MCH RBC QN AUTO: 28.9 PG (ref 26.5–33)
MCHC RBC AUTO-ENTMCNC: 32 G/DL (ref 31.5–36.5)
MCV RBC AUTO: 90 FL (ref 78–100)
PLATELET # BLD AUTO: 206 10E3/UL (ref 150–450)
POTASSIUM SERPL-SCNC: 3.9 MMOL/L (ref 3.4–5.3)
PROT SERPL-MCNC: 7.7 G/DL (ref 6.4–8.3)
RBC # BLD AUTO: 4.36 10E6/UL (ref 3.8–5.2)
SODIUM SERPL-SCNC: 137 MMOL/L (ref 135–145)
TSH SERPL DL<=0.005 MIU/L-ACNC: 4.03 UIU/ML (ref 0.3–4.2)
WBC # BLD AUTO: 2.6 10E3/UL (ref 4–11)

## 2024-02-27 PROCEDURE — 36415 COLL VENOUS BLD VENIPUNCTURE: CPT | Performed by: OBSTETRICS & GYNECOLOGY

## 2024-02-27 PROCEDURE — 84702 CHORIONIC GONADOTROPIN TEST: CPT | Performed by: OBSTETRICS & GYNECOLOGY

## 2024-02-27 PROCEDURE — 99395 PREV VISIT EST AGE 18-39: CPT | Performed by: OBSTETRICS & GYNECOLOGY

## 2024-02-27 PROCEDURE — 80053 COMPREHEN METABOLIC PANEL: CPT | Performed by: OBSTETRICS & GYNECOLOGY

## 2024-02-27 PROCEDURE — 81025 URINE PREGNANCY TEST: CPT | Performed by: OBSTETRICS & GYNECOLOGY

## 2024-02-27 PROCEDURE — 84443 ASSAY THYROID STIM HORMONE: CPT | Performed by: OBSTETRICS & GYNECOLOGY

## 2024-02-27 PROCEDURE — 85027 COMPLETE CBC AUTOMATED: CPT | Performed by: OBSTETRICS & GYNECOLOGY

## 2024-02-27 ASSESSMENT — PATIENT HEALTH QUESTIONNAIRE - PHQ9: SUM OF ALL RESPONSES TO PHQ QUESTIONS 1-9: 0

## 2024-02-27 NOTE — PROGRESS NOTES
"Robert is a 34 year old  here for annual exam.   Patient delivered vaginally over 3 months ago..  She was breast-feeding up until 2 months ago..  She still has not had a period yet.  At the end of the visit her  called in and wanted her to have a pregnancy test in addition to her labs today.    ROS: Ten point review of systems was reviewed and negative except the above.    Health Maintenance   Topic Date Due    HEPATITIS B IMMUNIZATION (1 of 3 - 19+ 3-dose series) Never done    YEARLY PREVENTIVE VISIT  2023    COVID-19 Vaccine ( - - season) Never done    ADVANCE CARE PLANNING  2027    HPV TEST  2028    PAP  2028    DTAP/TDAP/TD IMMUNIZATION (3 - Td or Tdap) 2033    HEPATITIS C SCREENING  Completed    HIV SCREENING  Completed    PHQ-2 (once per calendar year)  Completed    INFLUENZA VACCINE  Completed    Pneumococcal Vaccine: Pediatrics (0 to 5 Years) and At-Risk Patients (6 to 64 Years)  Aged Out    IPV IMMUNIZATION  Aged Out    HPV IMMUNIZATION  Aged Out    MENINGITIS IMMUNIZATION  Aged Out    RSV MONOCLONAL ANTIBODY  Aged Out      Last pap:   Last Mammogram:   Last Dexa: none  Last Colonoscopy: none  Lab Results   Component Value Date    CHOL 195 2022     Lab Results   Component Value Date    HDL 40 2022     Lab Results   Component Value Date     2022     Lab Results   Component Value Date    TRIG 84 2022     No results found for: \"CHOLHDLRATIO\"      OBHX:    OB History    Para Term  AB Living   4 4 4 0 0 4   SAB IAB Ectopic Multiple Live Births   0 0 0 0 4      # Outcome Date GA Lbr Nabeel/2nd Weight Sex Delivery Anes PTL Lv   4 Term 23 38w4d 00:07 / 00:02 3.27 kg (7 lb 3.3 oz) M Vag-Spont None N HENRY      Name: Alexis BRAULIO Sasa      Apgar1: 9  Apgar5: 9   3 Term 19 38w3d  3.147 kg (6 lb 15 oz) M  None N HENRY      Complications: Precipitous delivery   2 Term 18 38w3d  2.86 kg (6 lb 4.9 oz) F " Vag-Spont None N HENRY      Name: MIC,BABY GIRL      Apgar1: 8  Apgar5: 9   1 Term 03/16/14   3.8 kg (8 lb 6 oz) M Vag-Spont   HENRY       Gyn surg.:    PMH: Her past medical, surgical, and obstetric histories were reviewed and are documented in their appropriate chart areas.    ALL/Meds: Her medication and allergy histories were reviewed and are documented in their appropriate chart areas.    SH/FMH: Her social and family history was reviewed and documented in its appropriate chart area.    PE: BP (!) 151/104 (BP Location: Right arm, Patient Position: Sitting, Cuff Size: Adult Regular)   Pulse 94   Wt 66.6 kg (146 lb 14.4 oz)   SpO2 100%   Breastfeeding No   BMI 24.96 kg/m    Body mass index is 24.96 kg/m .    General Appearance:  healthy, alert, active, no distress  Cardiovascular:  Regular rate and Rhythm  Neck: Supple, no adenopathy, and thyroid normal  Lungs:  Clear, without wheeze, rale or rhonchi  Breast: NOT EXAMINED per her request  Abdomen: Benign, Soft, flat, non-tender, No masses, organomegaly, No inguinal nodes, and Bowel sounds normoactiveSoft, nontender.   Pelvic:       - Ext: Vulva and perineum are normal without lesion, mass or discharge        - Urethra: normal without discharge or scarring or hypermobility       - Urethral Meatus: normal appearance,        - Bladder: no tenderness, no masses       - Vagina: Normal mucosa, no discharge     rugated       - Cervix: normal       - Uterus:Normal shape, position and consistencyfirm, nontender, nongravid uterus without CMT       - Adnexa: Normal without masses or tenderness       - Rectal: deferred    A/P:  [facility],     ICD-10-CM    1. Encounter for gynecological examination with abnormal finding  Z01.411 CBC with platelets     Comprehensive metabolic panel (BMP + Alb, Alk Phos, ALT, AST, Total. Bili, TP)     TSH with free T4 reflex     HCG quantitative pregnancy     HCG qualitative urine      2. Elevated blood pressure reading without diagnosis  of hypertension  R03.0 CBC with platelets     Comprehensive metabolic panel (BMP + Alb, Alk Phos, ALT, AST, Total. Bili, TP)     TSH with free T4 reflex     HCG quantitative pregnancy     HCG qualitative urine        Return to clinic for blood pressure check in 2 to 3 weeks..   - Encouraged self-breast exam   - Encouraged low fat diet, regular exercise, and adequate calcium intake.    CEPHAS AGBEH, MD.

## 2024-02-27 NOTE — PATIENT INSTRUCTIONS
To Schedule an Appointment 24/7  Call: [phone]    If you have any questions regarding your visit, Please contact your care team.  [facility]: [phone]  [facility] HOURS TELEPHONE NUMBER   Cephas Agbeh, M.D.      Melissa Quesada-Surgery Scheduler  Charity-Surgery Scheduler       Monday - [place]:    8:00 am-4:45 pm  Tuesday - [place]:   8:00 am-4:45 pm  Friday-[place]:       8:00 am-4:45 pm  Typical Surgery Day:  Wednesday [facility]   [address] [place] [address]   [phone]   Fax [phone]    Imaging Scheduling all locations  [phone]       Labor and Delivery   [address] [place] [address] [place] [address]   [phone]    [facility]  [address] [facility] [address]  [phone]  Fax [phone]   Urgent Care locations:  [facility] Monday-Friday                               10 am - 8 pm  Saturday and Sunday                      9 am - 5 pm  Monday-Friday                              10 am- 8 pm  Saturday and Sunday                      9 am - 5 pm    [phone]   **Surgeries** Our Surgery Schedulers will contact you to schedule. If you do not receive a call within 3 business days, please call [phone].  If you need a medication refill, please contact your pharmacy. Please allow 3 business days for your refill to be completed.  As always, Thank you for trusting us with your healthcare needs!  see additional instructions from your care team below

## 2024-03-14 NOTE — PATIENT INSTRUCTIONS
To Schedule an Appointment 24/7  Call: [phone]    If you have any questions regarding your visit, Please contact your care team.  [facility]: [phone]  [facility] HOURS TELEPHONE NUMBER   Cephas Agbeh, M.D.      Melissa Quesada-Surgery Scheduler  Charity-Surgery Scheduler       Monday - [place]:    8:00 am-4:45 pm  Tuesday - [place]:   8:00 am-4:45 pm  Friday-[place]:       8:00 am-4:45 pm  Typical Surgery Day:  Wednesday [facility]   [address] [place] [address]   [phone]   Fax [phone]    Imaging Scheduling all locations  [phone]      [facility] Labor and Delivery   [address] [place] [address] [place] [address]   [phone]    [facility]  [address] [facility] [address]  [phone]  Fax [phone]   Urgent Care locations:  [facility] Monday-Friday                               10 am - 8 pm  Saturday and Sunday                      9 am - 5 pm  Monday-Friday                              10 am- 8 pm  Saturday and Sunday                      9 am - 5 pm    [phone]   **Surgeries** Our Surgery Schedulers will contact you to schedule. If you do not receive a call within 3 business days, please call [phone].  If you need a medication refill, please contact your pharmacy. Please allow 3 business days for your refill to be completed.  As always, Thank you for trusting us with your healthcare needs!  see additional instructions from your care team below

## 2024-03-19 ENCOUNTER — OFFICE VISIT (OUTPATIENT)
Dept: OBGYN | Facility: CLINIC | Age: 34
End: 2024-03-19
Payer: COMMERCIAL

## 2024-03-19 VITALS
HEART RATE: 86 BPM | DIASTOLIC BLOOD PRESSURE: 84 MMHG | WEIGHT: 152.3 LBS | BODY MASS INDEX: 25.87 KG/M2 | SYSTOLIC BLOOD PRESSURE: 132 MMHG | OXYGEN SATURATION: 98 %

## 2024-03-19 DIAGNOSIS — Z30.013 ENCOUNTER FOR INITIAL PRESCRIPTION OF INJECTABLE CONTRACEPTIVE: Primary | ICD-10-CM

## 2024-03-19 PROCEDURE — 96372 THER/PROPH/DIAG INJ SC/IM: CPT | Performed by: OBSTETRICS & GYNECOLOGY

## 2024-03-19 PROCEDURE — 99213 OFFICE O/P EST LOW 20 MIN: CPT | Mod: 25 | Performed by: OBSTETRICS & GYNECOLOGY

## 2024-03-19 RX ORDER — MEDROXYPROGESTERONE ACETATE 150 MG/ML
150 INJECTION, SUSPENSION INTRAMUSCULAR
Status: ACTIVE | OUTPATIENT
Start: 2024-03-19 | End: 2025-03-14

## 2024-03-19 RX ADMIN — MEDROXYPROGESTERONE ACETATE 150 MG: 150 INJECTION, SUSPENSION INTRAMUSCULAR at 13:41

## 2024-03-19 NOTE — PROGRESS NOTES
Clinic Administered Medication Documentation        Patient was given Depo Provera. Prior to medication administration, verified patient's identity using patient s name and date of birth. Please see MAR and medication order for additional information. Patient instructed to remain in clinic for 15 minutes, report any adverse reaction to staff immediately, and remain in clinic for 15 minutes and report any adverse reaction to staff immediately but patient declined.    Vial/Syringe: Single dose vial. Was entire vial of medication used? Yes    NEXT INJECTION DUE: 6/4/24 - 7/2/24

## 2024-03-19 NOTE — TELEPHONE ENCOUNTER
FUTURE VISIT INFORMATION      FUTURE VISIT INFORMATION:  Date: 4/16/2024  Time: Noon   Location:  allergy   REFERRAL INFORMATION:  Referring provider:   Referred by Carolyn Gray MD in [facility]  Referring providers clinic:    Reason for visit/diagnosis   Patch testing for Dermatitis -  RECORDS REQUESTED FROM:       Clinic name Comments Records Status Imaging Status    2/15/2024 Derm in EPIC      9/14/2023 Derm in EPIC      5/8/2023 Derm       More in EPIC

## 2024-03-19 NOTE — PROGRESS NOTES
Robert is a 34 year old  referred here by self for consultation regarding recheck of her blood pressures and to discuss contraception..  Last menstrual period was 5 days ago.  She has not had any sexual intercourse.  2 weeks ago was hCG level was negative.  Was used the Depo-Provera in the past and would like to go back on that.    ROS: Ten point review of systems was reviewed and negative except the above.    Gyne: - abn pap (last pap ), - STD's    History reviewed. No pertinent past medical history.  Past Surgical History:   Procedure Laterality Date    laproscopy      infertility     Patient Active Problem List   Diagnosis    Normal pregnancy in third trimester    GBS (group B Streptococcus carrier), +RV culture, currently pregnant    Term pregnancy    Gestational hypertension       ALL/Meds: Her medication and allergy histories were reviewed and are documented in their appropriate chart areas.    SH: - tob, - EtOH,     FH: Her family history was reviewed and documented in its appropriate chart area.    PE: /84 (BP Location: Right arm, Patient Position: Sitting, Cuff Size: Adult Regular)   Pulse 86   Wt 69.1 kg (152 lb 4.8 oz)   SpO2 98%   Breastfeeding No   BMI 25.87 kg/m    Body mass index is 25.87 kg/m .    General Appearance:  healthy, alert, active, no distress  HEENT: NCAT    A/P    ICD-10-CM    1. Encounter for initial prescription of injectable contraceptive  Z30.013 medroxyPROGESTERone (DEPO-PROVERA) injection 150 mg        CEPHAS AGBEH, MD.        CEPHAS AGBEH, MD.

## 2024-04-16 ENCOUNTER — PRE VISIT (OUTPATIENT)
Dept: ALLERGY | Facility: CLINIC | Age: 34
End: 2024-04-16

## 2024-06-07 ENCOUNTER — ALLIED HEALTH/NURSE VISIT (OUTPATIENT)
Dept: FAMILY MEDICINE | Facility: OTHER | Age: 34
End: 2024-06-07
Payer: MEDICAID

## 2024-06-07 DIAGNOSIS — Z30.42 ENCOUNTER FOR MANAGEMENT AND INJECTION OF INJECTABLE PROGESTIN CONTRACEPTIVE: Primary | ICD-10-CM

## 2024-06-07 PROCEDURE — 96372 THER/PROPH/DIAG INJ SC/IM: CPT | Performed by: OBSTETRICS & GYNECOLOGY

## 2024-06-07 PROCEDURE — 99207 PR NO CHARGE NURSE ONLY: CPT

## 2024-06-07 RX ADMIN — MEDROXYPROGESTERONE ACETATE 150 MG: 150 INJECTION, SUSPENSION INTRAMUSCULAR at 15:19

## 2024-08-14 ENCOUNTER — TELEPHONE (OUTPATIENT)
Dept: ALLERGY | Facility: CLINIC | Age: 34
End: 2024-08-14
Payer: COMMERCIAL

## 2024-08-20 ENCOUNTER — TELEPHONE (OUTPATIENT)
Dept: DERMATOLOGY | Facility: CLINIC | Age: 34
End: 2024-08-20
Payer: COMMERCIAL

## 2024-08-20 NOTE — TELEPHONE ENCOUNTER
Left Voicemail (2nd Attempt) for the patient to call back and schedule the following:    Appointment type: New Allergy   Provider: Dr. Ortega  Return date: next available   Specialty phone number: [phone]  Additional appointment(s) needed:   Additonal Notes:

## 2024-08-30 ENCOUNTER — ALLIED HEALTH/NURSE VISIT (OUTPATIENT)
Dept: FAMILY MEDICINE | Facility: OTHER | Age: 34
End: 2024-08-30
Payer: COMMERCIAL

## 2024-08-30 DIAGNOSIS — Z30.42 ENCOUNTER FOR MANAGEMENT AND INJECTION OF INJECTABLE PROGESTIN CONTRACEPTIVE: Primary | ICD-10-CM

## 2024-08-30 PROCEDURE — 96372 THER/PROPH/DIAG INJ SC/IM: CPT | Performed by: OBSTETRICS & GYNECOLOGY

## 2024-08-30 PROCEDURE — 99207 PR NO CHARGE NURSE ONLY: CPT

## 2024-08-30 RX ADMIN — MEDROXYPROGESTERONE ACETATE 150 MG: 150 INJECTION, SUSPENSION INTRAMUSCULAR at 15:11

## 2024-08-30 NOTE — PROGRESS NOTES
Clinic Administered Medication Documentation      Depo Provera Documentation    Depo-Provera Standing Order inclusion/exclusion criteria reviewed.     Is this the initial or subsequent dose of Depo Provera? Subsequent dose - patient is within the acceptable window of time (11-15 weeks) for subsequent injection. Pregnancy test not indicated.    Patient meets: inclusion criteria     Is there an active order (written within the past 365 days, with administrations remaining, not ) in the chart? Yes.     Prior to injection, verified patient identity using patient's name and date of birth. Medication was administered. Please see MAR and medication order for additional information.     Vial/Syringe: Single dose vial. Was entire vial of medication used? Yes    Patient instructed to remain in clinic for 15 minutes and report any adverse reaction to staff immediately.  NEXT INJECTION DUE: 11/15/24 - 24    Verified that the patient has refills remaining in their prescription.    Sandhya Johnson MA on 2024 at 3:13 PM

## 2024-10-04 ENCOUNTER — MEDICAL CORRESPONDENCE (OUTPATIENT)
Dept: HEALTH INFORMATION MANAGEMENT | Facility: CLINIC | Age: 34
End: 2024-10-04
Payer: COMMERCIAL

## 2024-10-18 ENCOUNTER — IMMUNIZATION (OUTPATIENT)
Dept: FAMILY MEDICINE | Facility: OTHER | Age: 34
End: 2024-10-18
Payer: COMMERCIAL

## 2024-10-18 DIAGNOSIS — Z23 ENCOUNTER FOR IMMUNIZATION: Primary | ICD-10-CM

## 2024-10-18 PROCEDURE — 90656 IIV3 VACC NO PRSV 0.5 ML IM: CPT

## 2024-10-18 PROCEDURE — 90471 IMMUNIZATION ADMIN: CPT

## 2024-10-18 PROCEDURE — 99207 PR NO CHARGE NURSE ONLY: CPT

## 2024-10-18 NOTE — PROGRESS NOTES
Prior to immunization administration, verified patients identity using patient s name and date of birth. Please see Immunization Activity for additional information.     Is the patient's temperature normal (100.5 or less)? Yes - 97.8    Patient MEETS CRITERIA. PROCEED with vaccine administration.      Patient instructed to remain in clinic for 15 minutes afterwards, and to report any adverse reactions.      Screening performed by Gabby Vega CMA on 10/18/2024 at 3:27 PM.

## 2024-11-15 ENCOUNTER — ALLIED HEALTH/NURSE VISIT (OUTPATIENT)
Dept: FAMILY MEDICINE | Facility: OTHER | Age: 34
End: 2024-11-15
Payer: COMMERCIAL

## 2024-11-15 ENCOUNTER — TELEPHONE (OUTPATIENT)
Dept: OBGYN | Facility: CLINIC | Age: 34
End: 2024-11-15

## 2024-11-15 DIAGNOSIS — Z30.9 CONTRACEPTIVE MANAGEMENT: Primary | ICD-10-CM

## 2024-11-15 DIAGNOSIS — Z30.013 ENCOUNTER FOR INITIAL PRESCRIPTION OF INJECTABLE CONTRACEPTIVE: Primary | ICD-10-CM

## 2024-11-15 PROCEDURE — 99207 PR NO CHARGE NURSE ONLY: CPT

## 2024-11-15 RX ORDER — MEDROXYPROGESTERONE ACETATE 150 MG/ML
150 INJECTION, SUSPENSION INTRAMUSCULAR
Status: ACTIVE | OUTPATIENT
Start: 2024-11-15 | End: 2025-11-10

## 2024-11-15 RX ADMIN — MEDROXYPROGESTERONE ACETATE 150 MG: 150 INJECTION, SUSPENSION INTRAMUSCULAR at 15:07

## 2024-11-15 NOTE — PROGRESS NOTES
Clinic Administered Medication Documentation      Depo Provera Documentation    Depo-Provera Standing Order inclusion/exclusion criteria reviewed.     Is this the initial or subsequent dose of Depo Provera? Subsequent dose - patient is within the acceptable window of time (11-15 weeks) for subsequent injection. Pregnancy test not indicated.    Patient meets: inclusion criteria     Is there an active order (written within the past 365 days, with administrations remaining, not ) in the chart? Yes.     Prior to injection, verified patient identity using patient's name and date of birth. Medication was administered. Please see MAR and medication order for additional information.     Vial/Syringe: Single dose vial. Was entire vial of medication used? Yes    Patient instructed to remain in clinic for 15 minutes and report any adverse reaction to staff immediately.  NEXT INJECTION DUE: 25 - 25    Patient has no refills remaining. Refill encounter opened, order pended and Routed to the provider'

## 2024-11-26 ENCOUNTER — ANCILLARY PROCEDURE (OUTPATIENT)
Dept: GENERAL RADIOLOGY | Facility: OTHER | Age: 34
End: 2024-11-26
Attending: PHYSICIAN ASSISTANT
Payer: COMMERCIAL

## 2024-11-26 ENCOUNTER — OFFICE VISIT (OUTPATIENT)
Dept: FAMILY MEDICINE | Facility: OTHER | Age: 34
End: 2024-11-26
Payer: COMMERCIAL

## 2024-11-26 VITALS
SYSTOLIC BLOOD PRESSURE: 134 MMHG | TEMPERATURE: 98.1 F | OXYGEN SATURATION: 99 % | BODY MASS INDEX: 26.29 KG/M2 | HEIGHT: 64 IN | WEIGHT: 154 LBS | HEART RATE: 88 BPM | RESPIRATION RATE: 20 BRPM | DIASTOLIC BLOOD PRESSURE: 76 MMHG

## 2024-11-26 DIAGNOSIS — L30.9 DERMATITIS: ICD-10-CM

## 2024-11-26 DIAGNOSIS — M54.50 CHRONIC BILATERAL LOW BACK PAIN WITHOUT SCIATICA: Primary | ICD-10-CM

## 2024-11-26 DIAGNOSIS — G89.29 CHRONIC BILATERAL LOW BACK PAIN WITHOUT SCIATICA: ICD-10-CM

## 2024-11-26 DIAGNOSIS — M54.50 CHRONIC BILATERAL LOW BACK PAIN WITHOUT SCIATICA: ICD-10-CM

## 2024-11-26 DIAGNOSIS — G89.29 CHRONIC BILATERAL LOW BACK PAIN WITHOUT SCIATICA: Primary | ICD-10-CM

## 2024-11-26 PROCEDURE — 72100 X-RAY EXAM L-S SPINE 2/3 VWS: CPT | Mod: TC | Performed by: RADIOLOGY

## 2024-11-26 PROCEDURE — 99214 OFFICE O/P EST MOD 30 MIN: CPT | Performed by: PHYSICIAN ASSISTANT

## 2024-11-26 RX ORDER — TRIAMCINOLONE ACETONIDE 1 MG/G
CREAM TOPICAL 2 TIMES DAILY
Qty: 30 G | Refills: 0 | Status: SHIPPED | OUTPATIENT
Start: 2024-11-26

## 2024-11-26 RX ORDER — IBUPROFEN 600 MG/1
600 TABLET, FILM COATED ORAL EVERY 6 HOURS PRN
Qty: 60 TABLET | Refills: 2 | Status: SHIPPED | OUTPATIENT
Start: 2024-11-26

## 2024-11-26 RX ORDER — CYCLOBENZAPRINE HCL 5 MG
5 TABLET ORAL 3 TIMES DAILY PRN
Qty: 60 TABLET | Refills: 1 | Status: SHIPPED | OUTPATIENT
Start: 2024-11-26

## 2024-11-26 ASSESSMENT — ANXIETY QUESTIONNAIRES
3. WORRYING TOO MUCH ABOUT DIFFERENT THINGS: NOT AT ALL
GAD7 TOTAL SCORE: 0
7. FEELING AFRAID AS IF SOMETHING AWFUL MIGHT HAPPEN: NOT AT ALL
8. IF YOU CHECKED OFF ANY PROBLEMS, HOW DIFFICULT HAVE THESE MADE IT FOR YOU TO DO YOUR WORK, TAKE CARE OF THINGS AT HOME, OR GET ALONG WITH OTHER PEOPLE?: NOT DIFFICULT AT ALL
4. TROUBLE RELAXING: NOT AT ALL
IF YOU CHECKED OFF ANY PROBLEMS ON THIS QUESTIONNAIRE, HOW DIFFICULT HAVE THESE PROBLEMS MADE IT FOR YOU TO DO YOUR WORK, TAKE CARE OF THINGS AT HOME, OR GET ALONG WITH OTHER PEOPLE: NOT DIFFICULT AT ALL
5. BEING SO RESTLESS THAT IT IS HARD TO SIT STILL: NOT AT ALL
2. NOT BEING ABLE TO STOP OR CONTROL WORRYING: NOT AT ALL
GAD7 TOTAL SCORE: 0
7. FEELING AFRAID AS IF SOMETHING AWFUL MIGHT HAPPEN: NOT AT ALL
1. FEELING NERVOUS, ANXIOUS, OR ON EDGE: NOT AT ALL
6. BECOMING EASILY ANNOYED OR IRRITABLE: NOT AT ALL
GAD7 TOTAL SCORE: 0

## 2024-11-26 ASSESSMENT — PAIN SCALES - PAIN ENJOYMENT GENERAL ACTIVITY SCALE (PEG)
AVG_PAIN_PASTWEEK: 7
INTERFERED_ENJOYMENT_LIFE: 7
PEG_TOTALSCORE: 7
AVG_PAIN_PASTWEEK: 7
PEG_TOTALSCORE: 7
INTERFERED_GENERAL_ACTIVITY: 7
INTERFERED_ENJOYMENT_LIFE: 7
INTERFERED_GENERAL_ACTIVITY: 7

## 2024-11-26 ASSESSMENT — ENCOUNTER SYMPTOMS: BACK PAIN: 1

## 2024-11-26 ASSESSMENT — PAIN SCALES - GENERAL: PAINLEVEL_OUTOF10: SEVERE PAIN (7)

## 2024-11-26 ASSESSMENT — PATIENT HEALTH QUESTIONNAIRE - PHQ9
SUM OF ALL RESPONSES TO PHQ QUESTIONS 1-9: 0
10. IF YOU CHECKED OFF ANY PROBLEMS, HOW DIFFICULT HAVE THESE PROBLEMS MADE IT FOR YOU TO DO YOUR WORK, TAKE CARE OF THINGS AT HOME, OR GET ALONG WITH OTHER PEOPLE: NOT DIFFICULT AT ALL
SUM OF ALL RESPONSES TO PHQ QUESTIONS 1-9: 0

## 2024-11-26 NOTE — PROGRESS NOTES
Assessment & Plan   No diagnosis found.            {Tests, documents, or independent historian(s) (Optional):[id number]}     Diagnosis or treatment significantly limited by social determinants of health - ***    *** minutes spent on the date of the encounter doing chart review, history and exam, documentation and further activities as noted above    The patient indicates understanding of these issues and agrees with the plan.    Follow up:    PA student as below was present and participated in shadow capacity only    ZORA JulianS   [facility]     Bakari Jiménez PA-C  [facility]         Yang Jones is a 34 year old, presenting for the following health issues:  Back Pain      11/26/2024     2:35 PM   Additional Questions   Roomed by Mikayla HORTA     Back Pain            Pain History:  When did you first notice your pain? About 1 week   Have you seen anyone else for your pain? No  How has your pain affected your ability to work? Pain does not limit ability to work   What type of work do you or did you do? [employer]   Where in your body do you have pain? Back Pain  Onset/Duration: about 1 week  Description:   Location of pain: middle of back - midline  Character of pain: burning, constant, and waxing and waning  Pain radiation: none  New numbness or weakness in legs, not attributed to pain: no   Intensity: Currently 7/10  Progression of Symptoms: same  History:   Specific cause: lifting  Pain interferes with job: Yes, but works still   History of back problems: no prior back problems  Any previous MRI or X-rays: None  Sees a specialist for back pain: No  Alleviating factors:   Improved by: heating pad and sitting   Precipitating factors:  Worsened by: Lifting and Standing  Therapies tried and outcome: OTC gel - a little helped     - After 1 week started to feel it   - Very busy   - Came on after being very busy   - Doesn't feel like back pain in the  "past  - Last year was in the legs, coming from the back   - Now in the middle       No shooting pains  - Ibuprofen and muscle relaxer in the past   - Comes and goes      When stands and moves it hurts  - No changes to breathing, no changes to bowel movements     - Did physical therapy last year, didn't help             Review of Systems  Constitutional, neuro, ENT, endocrine, pulmonary, cardiac, gastrointestinal, genitourinary, musculoskeletal, integument and psychiatric systems are negative, except as otherwise noted.      Objective    /76 (Cuff Size: Adult Regular)   Pulse 88   Temp 98.1  F (36.7  C)   Resp 20   Ht 1.631 m (5' 4.21\")   Wt 69.9 kg (154 lb)   SpO2 99%   BMI 26.26 kg/m    Body mass index is 26.26 kg/m .  Physical Exam   GENERAL APPEARANCE: healthy, alert and no distress  EYES: Eyes grossly normal to inspection, PERRLA, conjunctivae and sclerae without injection or discharge, EOM intact   MS: No musculoskeletal defects are noted and gait is age appropriate without ataxia   SKIN: No suspicious lesions or rashes, hydration status appears adeuqate with normal skin turgor   NEURO: Strength 5+ bilateral lower extremities, sensation intact in distal bilateral lower extremities, mentation- intact, speech- normal, reflexes- symmetric in bilateral lower extremities  BACK: No CVA tenderness, no paralumbar tenderness, no midline tenderness, normal ROM   PSYCH: Alert and oriented x3; speech- coherent , normal rate and volume; able to articulate logical thoughts, able to abstract reason, no tangential thoughts, no hallucinations or delusions, mentation appears normal, Mood is euthymic. Affect is appropriate for this mood state and bright. Thought content is free of suicidal ideation, hallucinations, and delusions. Dress is adequate and upkept. Eye contact is good during conversation.       Diagnostics;     XR lumbar spine: Preliminary reading - normal bone structure with no evidence of fracture. Final " pending review by radiology.           Signed Electronically by: Renetta Jiménez PA-C  {Email feedback regarding this note to [email or web address]   :[id number]}

## 2025-04-05 ENCOUNTER — HEALTH MAINTENANCE LETTER (OUTPATIENT)
Age: 35
End: 2025-04-05

## 2025-04-21 ENCOUNTER — TELEPHONE (OUTPATIENT)
Dept: DERMATOLOGY | Facility: CLINIC | Age: 35
End: 2025-04-21

## 2025-04-30 ENCOUNTER — OFFICE VISIT (OUTPATIENT)
Dept: ALLERGY | Facility: CLINIC | Age: 35
End: 2025-04-30

## 2025-04-30 ENCOUNTER — TELEPHONE (OUTPATIENT)
Dept: DERMATOLOGY | Facility: CLINIC | Age: 35
End: 2025-04-30

## 2025-04-30 DIAGNOSIS — L20.89 OTHER ATOPIC DERMATITIS: Primary | ICD-10-CM

## 2025-04-30 DIAGNOSIS — L81.0 HYPERPIGMENTATION OF SKIN, POSTINFLAMMATORY: ICD-10-CM

## 2025-04-30 DIAGNOSIS — L23.5 ALLERGIC DERMATITIS DUE TO OTHER CHEMICAL PRODUCT: ICD-10-CM

## 2025-04-30 DIAGNOSIS — L85.3 XEROSIS CUTIS: ICD-10-CM

## 2025-04-30 RX ORDER — TRALOKINUMAB-LDRM 300 MG/2ML
600 INJECTION, SOLUTION SUBCUTANEOUS SEE ADMIN INSTRUCTIONS
Qty: 6 ML | Refills: 0 | OUTPATIENT
Start: 2025-04-30

## 2025-04-30 RX ORDER — TRALOKINUMAB-LDRM 300 MG/2ML
300 INJECTION, SOLUTION SUBCUTANEOUS
Qty: 4 ML | Refills: 11 | OUTPATIENT
Start: 2025-04-30

## 2025-04-30 NOTE — PROGRESS NOTES
[facility] Dermato-allergology Note  Office visit  Encounter Date: Apr 30, 2025  ____________________________________________    HPI:  (Apr 30, 2025)  Ms. Robert Anand is a(n) 35 year old female who presents today as new patient for allergy consultation  - Referred by Dr. Carolyn Gray on 2/15/24 for dermatitis.   - 9/14/2023 OV visit with Dr. Carolyn Gray (Derm)  FROM HPI:  Ms. Robert Anand is a(n) 33 year old female who presents today as a return.   She is 6 months pregnant. She stopped all topicals. Is itchy and dark again.     FROM A&P:  # Dermatitis, neck hands, itchy. Pregnant she reports for 6 months  Stop elidel(pimecrolimus and tretinoin if using)     Switch to hydrocortisone 2.5% cream : Apply twice daily for 1 week, then 3 times weekly for 1 week, then repeat, reviewed risks with pregnancy  -reviewed skin thinning     For the body, start vanicream twice daily from the neck down.  -referral Dr. Ortega  -Future-dupixent and return to tacrolimus inhibitor. COnsider new topical eucrisa also     # Acne vulgaris.   #melasma  -pt stopped tretinoin  -sunscreen    - 2/15/2024 VV visit with Dr. Carolyn Gray (Derm)  FROM A&P:  # Eczematous dermatitis, neck, arms, face with PIH. Patient is breastfeeding. I have never seen in person but  has. - last visit topicals adjusted. Dupixent was considered . Dr. Ortega referral for patch testing  - Lidex for up to 2 weeks per month on trunk and extemities  - ELIDEL BID for face   - Future consideration: Dupixent      # Acne vulgaris  -hold that   # Melasma.  -hold  - Otherwise feeling well in usual state of health    Physical Exam:  General: In no acute distress, well-developed, well-nourished  Eyes: no conjunctivitis  ENT: no signs of rhinitis   Pulmonary: no wheezing or coughing  Skin: Focused examination of the skin on test sites was performed = see test results below  Waist-up skin, which includes the head/face, neck, both arms,  chest, back, abdomen, digits and/or nails was examined.  - Generally very dry scaly skin with hyperpigmentation  - Some specials lesion on the neck and fontal hairline, little bit hyperkeratosis     Past Medical History:   Patient Active Problem List   Diagnosis    Normal pregnancy in third trimester    GBS (group B Streptococcus carrier), +RV culture, currently pregnant    Term pregnancy    Gestational hypertension     No past medical history on file.    Allergies:  No Known Allergies    Medications:  Current Outpatient Medications   Medication Sig Dispense Refill    Tralokinumab-ldrm (ADBRY) 300 MG/2ML SOAJ Inject 300 mg subcutaneously every 14 days. 4 mL 11    cyclobenzaprine (FLEXERIL) 5 MG tablet Take 1 tablet (5 mg) by mouth 3 times daily as needed for muscle spasms. 60 tablet 1    ibuprofen (ADVIL/MOTRIN) 600 MG tablet Take 1 tablet (600 mg) by mouth every 6 hours as needed for moderate pain. 60 tablet 2    Tralokinumab-ldrm (ADBRY) 300 MG/2ML SOAJ Inject 600 mg subcutaneously See Admin Instructions for 1 dose. followed by 300 mg every other week. 6 mL 0    triamcinolone (KENALOG) 0.1 % external cream Apply topically 2 times daily. (Not more than 2 weeks) 30 g 0     Current Facility-Administered Medications   Medication Dose Route Frequency Provider Last Rate Last Admin    medroxyPROGESTERone (DEPO-PROVERA) injection 150 mg  150 mg Intramuscular Q90 Days            Previous Labs, Allergy Tests, Dermatopathology, Imaging:  N/a    Referred By: Carolyn Gray MD  [address]  [place] [address]     Allergy Tests:  Past Allergy Test    Family History:  No family history on file.    Social History:  The patient works part-time at [employer].   Patient has the following hobbies or non-occupational exposure: n/a.    Order for Future Allergy Testing:    [x] Outpatient  [] Inpatient: [facility]..../ Bed ...    Skin Atopy (atopic dermatitis)? [x] Yes     [] No  Comments:   - sent by Dr. Gray  for recurrent eczema   - has constant itching   - triamcinolone did not really help, caused more itching     Childhood eczema?   [] Yes     [x] No  Comments:   Hand eczema?   [] Yes     [x] No  If yes, leading hand? [] Right     [] Left     [] Ambidextrous  Comments:     Contact allergies?     Comments:   Including adhesives/bandages? [] Yes     [x] No  Comments:   Including metals?   [] Yes     [x] No  Comments:   - does not really wear jewelry  Other substances?   [] Yes     [x] No  Comments:     Drug allergies?   [] Yes     [x] No  Comments:   - stopped flexeril as it induced more itching   - no other medications     Angioedema?   [] Yes     [x] No  Comments:     Urticaria?   [] Yes     [x] No  Comments:     Food allergies?   [] Yes     [x] No  Comments:     Pet allergies?   [] Yes     [x] No  Comments:     Environmental allergy symptoms?  [] Conjunctivitis  [] Otitis  [] Pharyngitis  [] Polyposis  [] Postnasal Drip  [] Rhinitis  [] Sinusitis  [x] None  Comments:     HENT Operations?  [] Adenoids [] Septum [] Sinus  [] Tonsils        [] Other:   [x] None  Comments:     Pulmonary symptoms (from birth to present)?  [] Asthma bronchiale  Inhaler(s)?:   [] Coughing  [] Other  [x] None  Comments:     Environmental and pulmonary symptoms aggravated by?  Season: [x] None     [] I     [] II     [] III     [] IV     [] V     [] VI          [] VII     [] VIII     [] IX     [] X     [] XI     [] XII     [] Perennial  Time of Day: [x] None     [] Morning     [] Noon     [] Evening     [] Night     [] Whole Day  Location/Changes: [x] None     [] Inside     [] Outside     [] Mountain     [] Sea     [] Other:   Triggers (specific): [x] None     [] Animals     [] Dust     [] Mold     [] Plants     [] Other:   Triggers (other): [x] None     [] Psyche     [] Sport     [] Work     [] Other:   Irritant: [x] None     [] Cold     [] Heat     [] Odors     [] Physical Efforts     [] Smoke     [] Other:     Order for PATCH TESTS  Reason  for tests (suspected allergy): possible contact allergy  Known previous allergies: none confirmed   Standardized panels  [x] Standard panel (40 tests)  [x] Preservatives & Antimicrobials (31 tests)  [x] Emulsifiers & Additives (25 tests)   [x] Perfumes/Flavours & Plants (25 tests)  [] Hairdresser panel (12 tests)  [] Rubber Chemicals (22 tests)  [] Plastics (26 tests)  [] Colorants/Dyes/Food additives (20 tests)  [] Metals (implants/dental) (24 tests)  [] Local anaesthetics/NSAIDs (13 tests)  [] Antibiotics & Antimycotics (14 tests)   [x] Corticosteroids (15 tests)   [] Photopatch test (62 tests)   [] Others:     [] Patient's Own Products:   DO NOT test if chemical or biological identity is unknown!   always ask from patient the product information and safety sheets (MSDS)       Order for PRICK TESTS    Reason for tests (suspected allergy): atopy?  Known previous allergies: none confirmed     Standardized prick panels  [x] Atopic panel (20 tests)  [] Pediatric Panel (12 tests)  [] Milk, Meat, Eggs, Grains (20 tests)   [] Dust, Epithelia, Feathers (10 tests)  [] Fish, Seafood, Shellfish (17 tests)  [] Nuts, Beans (14 tests)  [] Spice, Vegetable, Fruit (17 tests)  [] Pollen Panel = Tree, Grass, Weed (24 tests)  [] Others:     [] Patient's Own Products:   DO NOT test if chemical or biological identity is unknown!   always ask from patient the product information and safety sheets (MSDS)     Standardized intradermal tests  [] Alternaria alternata  [] Aspergillus fumigatus  [] Cladosporium herbarum   [] Penicillium notatum  [] Dermatophagoides farinae  [] Dermatophagoides pteronyssinus  [] Dog Epithelium  [] Cat Epithelium  [] Others:     [] Bee venom   [] Wasp venom  !!Specific protocol with dilutions!!       Order for Drug allergy tests (prick & intradermal & patch tests)    [] Penicillin G     [] Ampicillin   [] Cefazolin (1st gen)     [] Cefuroxime (2nd gen)     [] Ceftriaxone (3rd gen)     [] Ceftazidime (3rd gen)      [] Cefepime (4th gen)       [] Bactrim  [] Iodixanol (Visipaque)     [] Iopamidol (Isovue)       [] Iohexol (Omnipaque)  [] Others:   [] Patient's Own:   Order for .... as test date        Atopy Screen (Placed Apr 30, 2025)  No Substance Readings (15 min) Evaluation   POS Histamine 1mg/ml ++    NEG NaCl 0.9% -      No Substance Readings (15 min) Evaluation   1 Alternaria alternata (tenuis)  -    2 Cladosporium herbarum -    3 Aspergillus fumigatus -    4 Penicillium notatum -    5 Dermatophagoides pteronyssinus -    6 Dermatophagoides farinae -    7 Dog epithelium (canis spp) -    8 Cat hair (karla catus) -    9 Cockroach   (Blatella americana & germanica) -    10 Grass mix midwest   (Lora, Orchard, Redtop, Silvino) -    11 Michael grass (sorghum halepense) -    12 Weed mix   (common Cocklebur, Lamb s quarters, rough redroot Pigweed, Dock/Sorrel) -    13 Mug wort (artemisia vulgare) -    14 Ragweed giant/short (ambrosia spp) -    15 White birch (Betula papyrifera) -    16 Tree mix 1 (Pecan, Maple BHR, Oak RVW, american [place], black [place]) -    17 Red cedar (juniperus virgin[place]) -    18 Tree mix 2   (white Cam, river/red Birch, black [place], common [place], american Elm) -    19 Box elder/Maple mix (acer spp) -    20 [place] shagbark (carya ovata) -           Conclusion  ________________________________    Assessment & Plan:    ==> Final Diagnosis:     # Generalized dry skin with hyperpigmented eczematous lesions on head, extremities, and trunk  DDx: atopic dermatitis and/or allergic contact dermatitis  * chronic illness with exacerbation, progression, side effects from treatment    # No clear signs for atopy  Personal and family history for atopy negative   No inhalant allergies   Prick testing negative   * stable chronic illness    In her labs there were no signs for eosinophilia in peripheral blood and kidney and liver tests were normal. Her TSH was also normal, so there are no signs for thyroid disease.         These conclusions are made at the best of one's knowledge and belief based on the provided evidence such as patient's history and allergy test results and they can change over time or can be incomplete because of missing information.    ==> Treatment Plan:    >> For atopic dermatitis,   >> Discussed initiation of Adbry  - Start Adbry (Tralokinumab-ldrm) 600 mg (loading dose) and 300 mg every 14 days.  Disp-4 mL, R-11  - Referred to MTM team for assistance.    >> Patient will return to clinic for patch testing to rule out allergic contact allergy.     Procedures Performed: Allergy tests, including prick tests    Staff Involved: Provider, Staff, and Scribe    Scribe Disclosure:   I, Ramírez Jung, am serving as a scribe to document services personally performed by Juve Ortega MD based on data collection and the provider's statements to me.     Staff Physician Comments:  I was present with the scribe who participated in the documentation of the note. I have verified the history and personally performed the physical exam and medical decision making. I agree with the assessment and plan as documented in the note. I have reviewed and if necessary amended the note.      Juve Ortega MD  Professor  Head of Dermato-Allergy Division  Department of Dermatology  [facility]      Follow-up in Derm-Allergy clinic for patch testing as planned  - Not currently scheduled to follow up with referring derm provider Dr. Gray or any other derm via [facility]/CE  ____________________________________________      I spent a total of 38 minutes with Robert Anand. This time was spent counseling the patient and/or coordinating care, explaining the allergy tests, performing allergy tests and assessing the clinical relevance.

## 2025-04-30 NOTE — LETTER
4/30/2025      Robert Anand  [id number] [id number]      Dear Colleague,    Thank you for referring your patient, Robert Anand, to the [facility] [place]. Please see a copy of my visit note below.    [facility] Dermato-allergology Note  Office visit  Encounter Date: Apr 30, 2025  ____________________________________________    HPI:  (Apr 30, 2025)  Ms. Robert Anand is a(n) 35 year old female who presents today as new patient for allergy consultation  - Referred by Dr. Carolyn Gray on 2/15/24 for dermatitis.   - 9/14/2023 OV visit with Dr. Carolyn Gray (Derm)  FROM HPI:  Ms. Robert Anand is a(n) 33 year old female who presents today as a return.   She is 6 months pregnant. She stopped all topicals. Is itchy and dark again.     FROM A&P:  # Dermatitis, neck hands, itchy. Pregnant she reports for 6 months  Stop elidel(pimecrolimus and tretinoin if using)     Switch to hydrocortisone 2.5% cream : Apply twice daily for 1 week, then 3 times weekly for 1 week, then repeat, reviewed risks with pregnancy  -reviewed skin thinning     For the body, start vanicream twice daily from the neck down.  -referral Dr. Ortega  -Future-dupixent and return to tacrolimus inhibitor. COnsider new topical eucrisa also     # Acne vulgaris.   #melasma  -pt stopped tretinoin  -sunscreen    - 2/15/2024 VV visit with Dr. Carolyn Gray (Derm)  FROM A&P:  # Eczematous dermatitis, neck, arms, face with PIH. Patient is breastfeeding. I have never seen in person but  has. - last visit topicals adjusted. Dupixent was considered . Dr. Ortega referral for patch testing  - Lidex for up to 2 weeks per month on trunk and extemities  - ELIDEL BID for face   - Future consideration: Dupixent      # Acne vulgaris  -hold that   # Melasma.  -hold  - Otherwise feeling well in usual state of health    Physical Exam:  General: In no acute distress, well-developed,  well-nourished  Eyes: no conjunctivitis  ENT: no signs of rhinitis   Pulmonary: no wheezing or coughing  Skin: Focused examination of the skin on test sites was performed = see test results below  Waist-up skin, which includes the head/face, neck, both arms, chest, back, abdomen, digits and/or nails was examined.  - Generally very dry scaly skin with hyperpigmentation  - Some specials lesion on the neck and fontal hairline, little bit hyperkeratosis     Past Medical History:   Patient Active Problem List   Diagnosis     Normal pregnancy in third trimester     GBS (group B Streptococcus carrier), +RV culture, currently pregnant     Term pregnancy     Gestational hypertension     No past medical history on file.    Allergies:  No Known Allergies    Medications:  Current Outpatient Medications   Medication Sig Dispense Refill     Tralokinumab-ldrm (ADBRY) 300 MG/2ML SOAJ Inject 300 mg subcutaneously every 14 days. 4 mL 11     cyclobenzaprine (FLEXERIL) 5 MG tablet Take 1 tablet (5 mg) by mouth 3 times daily as needed for muscle spasms. 60 tablet 1     ibuprofen (ADVIL/MOTRIN) 600 MG tablet Take 1 tablet (600 mg) by mouth every 6 hours as needed for moderate pain. 60 tablet 2     Tralokinumab-ldrm (ADBRY) 300 MG/2ML SOAJ Inject 600 mg subcutaneously See Admin Instructions for 1 dose. followed by 300 mg every other week. 6 mL 0     triamcinolone (KENALOG) 0.1 % external cream Apply topically 2 times daily. (Not more than 2 weeks) 30 g 0     Current Facility-Administered Medications   Medication Dose Route Frequency Provider Last Rate Last Admin     medroxyPROGESTERone (DEPO-PROVERA) injection 150 mg  150 mg Intramuscular Q90 Days            Previous Labs, Allergy Tests, Dermatopathology, Imaging:  N/a    Referred By: Carolyn Gray MD  [address]  [place] [address]     Allergy Tests:  Past Allergy Test    Family History:  No family history on file.    Social History:  The patient works part-time at  [employer].   Patient has the following hobbies or non-occupational exposure: n/a.    Order for Future Allergy Testing:    [x] Outpatient  [] Inpatient: [facility]..../ Bed ...    Skin Atopy (atopic dermatitis)? [x] Yes     [] No  Comments:   - sent by Dr. Gray for recurrent eczema   - has constant itching   - triamcinolone did not really help, caused more itching     Childhood eczema?   [] Yes     [x] No  Comments:   Hand eczema?   [] Yes     [x] No  If yes, leading hand? [] Right     [] Left     [] Ambidextrous  Comments:     Contact allergies?     Comments:   Including adhesives/bandages? [] Yes     [x] No  Comments:   Including metals?   [] Yes     [x] No  Comments:   - does not really wear jewelry  Other substances?   [] Yes     [x] No  Comments:     Drug allergies?   [] Yes     [x] No  Comments:   - stopped flexeril as it induced more itching   - no other medications     Angioedema?   [] Yes     [x] No  Comments:     Urticaria?   [] Yes     [x] No  Comments:     Food allergies?   [] Yes     [x] No  Comments:     Pet allergies?   [] Yes     [x] No  Comments:     Environmental allergy symptoms?  [] Conjunctivitis  [] Otitis  [] Pharyngitis  [] Polyposis  [] Postnasal Drip  [] Rhinitis  [] Sinusitis  [x] None  Comments:     HENT Operations?  [] Adenoids [] Septum [] Sinus  [] Tonsils        [] Other:   [x] None  Comments:     Pulmonary symptoms (from birth to present)?  [] Asthma bronchiale  Inhaler(s)?:   [] Coughing  [] Other  [x] None  Comments:     Environmental and pulmonary symptoms aggravated by?  Season: [x] None     [] I     [] II     [] III     [] IV     [] V     [] VI          [] VII     [] VIII     [] IX     [] X     [] XI     [] XII     [] Perennial  Time of Day: [x] None     [] Morning     [] Noon     [] Evening     [] Night     [] Whole Day  Location/Changes: [x] None     [] Inside     [] Outside     [] Mountain     [] Sea     [] Other:   Triggers (specific): [x] None     [] Animals      [] Dust     [] Mold     [] Plants     [] Other:   Triggers (other): [x] None     [] Psyche     [] Sport     [] Work     [] Other:   Irritant: [x] None     [] Cold     [] Heat     [] Odors     [] Physical Efforts     [] Smoke     [] Other:     Order for PATCH TESTS  Reason for tests (suspected allergy): possible contact allergy  Known previous allergies: none confirmed   Standardized panels  [x] Standard panel (40 tests)  [x] Preservatives & Antimicrobials (31 tests)  [x] Emulsifiers & Additives (25 tests)   [x] Perfumes/Flavours & Plants (25 tests)  [] Hairdresser panel (12 tests)  [] Rubber Chemicals (22 tests)  [] Plastics (26 tests)  [] Colorants/Dyes/Food additives (20 tests)  [] Metals (implants/dental) (24 tests)  [] Local anaesthetics/NSAIDs (13 tests)  [] Antibiotics & Antimycotics (14 tests)   [x] Corticosteroids (15 tests)   [] Photopatch test (62 tests)   [] Others:     [] Patient's Own Products:   DO NOT test if chemical or biological identity is unknown!   always ask from patient the product information and safety sheets (MSDS)       Order for PRICK TESTS    Reason for tests (suspected allergy): atopy?  Known previous allergies: none confirmed     Standardized prick panels  [x] Atopic panel (20 tests)  [] Pediatric Panel (12 tests)  [] Milk, Meat, Eggs, Grains (20 tests)   [] Dust, Epithelia, Feathers (10 tests)  [] Fish, Seafood, Shellfish (17 tests)  [] Nuts, Beans (14 tests)  [] Spice, Vegetable, Fruit (17 tests)  [] Pollen Panel = Tree, Grass, Weed (24 tests)  [] Others:     [] Patient's Own Products:   DO NOT test if chemical or biological identity is unknown!   always ask from patient the product information and safety sheets (MSDS)     Standardized intradermal tests  [] Alternaria alternata  [] Aspergillus fumigatus  [] Cladosporium herbarum   [] Penicillium notatum  [] Dermatophagoides farinae  [] Dermatophagoides pteronyssinus  [] Dog Epithelium  [] Cat Epithelium  [] Others:     [] Bee venom    [] Wasp venom  !!Specific protocol with dilutions!!       Order for Drug allergy tests (prick & intradermal & patch tests)    [] Penicillin G     [] Ampicillin   [] Cefazolin (1st gen)     [] Cefuroxime (2nd gen)     [] Ceftriaxone (3rd gen)     [] Ceftazidime (3rd gen)     [] Cefepime (4th gen)       [] Bactrim  [] Iodixanol (Visipaque)     [] Iopamidol (Isovue)       [] Iohexol (Omnipaque)  [] Others:   [] Patient's Own:   Order for .... as test date        Atopy Screen (Placed Apr 30, 2025)  No Substance Readings (15 min) Evaluation   POS Histamine 1mg/ml ++    NEG NaCl 0.9% -      No Substance Readings (15 min) Evaluation   1 Alternaria alternata (tenuis)  -    2 Cladosporium herbarum -    3 Aspergillus fumigatus -    4 Penicillium notatum -    5 Dermatophagoides pteronyssinus -    6 Dermatophagoides farinae -    7 Dog epithelium (canis spp) -    8 Cat hair (karla catus) -    9 Cockroach   (Blatella americana & germanica) -    10 Grass mix midwest   (Lora, Orchard, Redtop, Silvino) -    11 Michael grass (sorghum halepense) -    12 Weed mix   (common Cocklebur, Lamb s quarters, rough redroot Pigweed, Dock/Sorrel) -    13 Mug wort (artemisia vulgare) -    14 Ragweed giant/short (ambrosia spp) -    15 White birch (Betula papyrifera) -    16 Tree mix 1 (Pecan, Maple BHR, Oak RVW, american [place], black [place]) -    17 Red cedar (juniperus [place]) -    18 Tree mix 2   (white [place], river/red Birch, black [place], common [place], american Elm) -    19 Box elder/Maple mix (acer spp) -    20 [place] shagbark (carya ovata) -           Conclusion  ________________________________    Assessment & Plan:    ==> Final Diagnosis:     # Generalized dry skin with hyperpigmented eczematous lesions on head, extremities, and trunk  DDx: atopic dermatitis and/or allergic contact dermatitis  * chronic illness with exacerbation, progression, side effects from treatment    # No clear signs for atopy  Personal and family history for  atopy negative   No inhalant allergies   Prick testing negative   * stable chronic illness    In her labs there were no signs for eosinophilia in peripheral blood and kidney and liver tests were normal. Her TSH was also normal, so there are no signs for thyroid disease.        These conclusions are made at the best of one's knowledge and belief based on the provided evidence such as patient's history and allergy test results and they can change over time or can be incomplete because of missing information.    ==> Treatment Plan:    >> For atopic dermatitis,   >> Discussed initiation of Adbry  - Start Adbry (Tralokinumab-ldrm) 600 mg (loading dose) and 300 mg every 14 days.  Disp-4 mL, R-11  - Referred to MTM team for assistance.    >> Patient will return to clinic for patch testing to rule out allergic contact allergy.     Procedures Performed: Allergy tests, including prick tests    Staff Involved: Provider, Staff, and Scribe    Scribe Disclosure:   I, Ramírez Jung, am serving as a scribe to document services personally performed by Juve Ortega MD based on data collection and the provider's statements to me.     Staff Physician Comments:  I was present with the scribe who participated in the documentation of the note. I have verified the history and personally performed the physical exam and medical decision making. I agree with the assessment and plan as documented in the note. I have reviewed and if necessary amended the note.      Juve Ortega MD  Professor  Head of Dermato-Allergy Division  Department of Dermatology  [facility]      Follow-up in Derm-Allergy clinic for patch testing as planned  - Not currently scheduled to follow up with referring derm provider Dr. Gray or any other derm via [facility]/CE  ____________________________________________      I spent a total of 38 minutes with Robert Anand. This time was spent counseling the patient and/or coordinating  care, explaining the allergy tests, performing allergy tests and assessing the clinical relevance.       Again, thank you for allowing me to participate in the care of your patient.        Sincerely,        Juve Ortega MD    Electronically signed

## 2025-04-30 NOTE — PATIENT INSTRUCTIONS
You can always access your most recent office visit note with this allergy clinic via [facility] MyChart, which contains all of your results from testing performed by Dr. Ortega along with the details of the discussed treatment plan.  If you do not have access to [facility], please discuss with a [facility] staff member. Additionally, if your provider is within [facility] or their EMR participates in the [facility]) network, they can also access this information.     ____________________________________________       _____________________________    PATCH TESTING    WHAT IS A PATCH TEST ?    A patch test is a way of identifying whether a substance has caused a delayed reaction with skin inflammation, such as contact eczema or delayed (after days) reactions to drugs. We will use various different types of test compounds, which may include common allergens in occupation and daily life such as  preservatives, fragrances or even drugs. Most of the time we will use standardized, prefabricated test solutions and the choice of the substances and series tested will depend on the history of the allergic reactions. Sometimes we will test your own products you are exposed at home or at work. In order to avoid severe toxic reactions we need detailed information s or safety sheets about each of these test compounds.    HOW IS A PATCH TEST PERFORMED ?    You will be given three appointments to complete this test. On the first appointment the nurse will apply 100-180 small test chambers each one of them containing a different allergen on your back and/or on your arms. We will use hypoallergenic and somehow waterproof adhesive tape. Afterwards the different sites will be marked with a waterproof marker. These patches must stay in place for 2 days. On the second appointment the patches will be removed and the allergy doctor/nurse will evaluate the skin reactions and maybe re-apply the marks. On the third appointment the allergy  doctor will re-evaluate possible allergic reactions and discuss with you the nature of the allergens you react to and how to avoid them.    AVOID THE FOLLOWING:    DO NOT wash your back and other test areas during the entire test period (3-5 days), NO bathing or swimming  AVOID strenuous exercise with sweating  DO NOT scratch the test area  AVOID exposure to UV irradiation (sun, therapy)    Patch tests should be performed when the allergy is resolved  Remove patch tests only if severe reaction (itch, pain, blistering) and report to your doctor immediately. Loretta then the locations of each test field  If patch tests peel off, then try to fix them and record time and loretta test field  No oral steroids (e.g. Prednisone) 1 month prior to tests    WHAT ARE THE POSSIBLE RISKS OF PATCH TESTS ?    If you are allergic to a compound tested you will develop after a few days localized skin reactions similar to your previous allergic reaction. This includes formation of red, itchy skin lesions of few mm to cm with small vesicles and even blisters. The lesions will fade off over days and weeks and might sometimes leave for a few months some skin pigmentations. In rare cases a localized reaction to patch tests can become generalized. The tests with your own products might have some risks because they are not standardized and unanticipated reactions can occur. We need as much information as possible to evaluate if your own products are safe to test and under what conditions. This has to be evaluated for each individual product.        ? WHAT ARE THE PREPARATIONS NEEDED FOR THESE VARIOUS ALLERGY TESTS ?    Some medications can affect the reactions to allergens during the tests. Therefore reveal all the medications you take to the allergy team and the doctor will discuss with you the medications before/during the tests. Normally, you have to respect following rules (unless otherwise instructed by doctor):  For prick, intradermal and  provocation tests stop antihistamines (e.g. loratadine (Claritin), cetirizine (Zyrtec), fexofenadine (Allegra) etc 1 week prior to the appointment   For patch tests and provocation tests, stop systemic corticosteroids 1 month and topical steroids 1 week prior to tests  Eat normally and take a shower before testing begins (do not put anything, including lotion, on the back after showering)    _____________________________    SKIN PRICK TESTING    WHAT IS A SKIN PRICK TEST (SPT) ?    A skin prick test (SPT) is a simple and reliable diagnostic test used to identify substances ( allergens ) responsible for triggering the symptoms of allergy. The basic SPT panel includes common allergens, such as house dust mites, molds, dog and cat allergens and grass/tree pollen allergens. We have other more specialized series for various food allergens and sometimes we test your own food directly on your skin. Tests will be usually performed on the skin of the forearm (rarely on back). The skin may develop a red and itchy reaction which can be an indication of an allergy to to tested substance, but will be confirmed by discussion with the allergy specialist    HOW IS A SPT PERFORMED ?    The skin will be disinfected with 70% Isopropanol alcohol, then marked and numbered with a pen to identify the areas where the specific allergens will be tested. Afterwards a drop of the allergen solution will be placed on the skin and then the skin gently pricked using the tip of a specially designed prick needle. With this procedure the most superficial part of the skin will be pierced to allow the test solution to diffuse into the skin and make contact with the reactive immune cells. After 15-30min the area will be examined and evaluated for possible allergic reactions.        WHAT ARE THE POSSIBLE RISKS OF SPT ?    If the skin reacts it will develop red, itchy wheals of 5-30mm diameter. The wheal and itch will usually disappear spontaneously after  1-2 hours. Sometimes there might be a delayed reactions after days and this has to be reported to the treating allergy specialist (could be another kind of reaction pattern and important piece of information). Rarely there are more serious generalized allergic reactions observed and therefore you will be under observation of the allergy team during the entire procedure. There is a small risk for some bleeding and skin infection by the SPT.    _____________________________    INTRADERMAL TESTS      WHAT IS AN INTRADERMAL TEST (IDT) ?    An intradermal test (IDT) is basically a continuation of the SPT and is sometimes proposed if the skin prick test is negative and the person is strongly suspected to have an allergy to a particular substance. IDT is particularly used for diagnosis of drug allergies and only sterile solutions will be tested by IDT. Because more allergen is delivered to the skin than in SPT the IDT can add more sensitivity to the allergy tests, but with a higher potential risk.     HOW IS AN INTRADERMAL TEST (IDT) PERFORMED ?    A small amount (~ 0.05ml) of the suspected allergen is injected with a very fine insulin needle just beneath the skin. The injections are made at spaced intervals after disinfection and marking of the skin areas. The tests are usually performed on the forearm (rarely back). The initial test will be started with a very diluted solution and if the results are negatives the procedure might be repeated with serial dilutions of higher concentrations. Therefore, the estimated duration of this test is about 45-60 min. Sometimes we observe delayed type reactions to the intradermal tests after 1-4 days and if this is the case take a photo and inform our staff and load the photo into [employer]. Best would be to take the photos with a ruler that we know at which position the positive test was.          WHAT ARE THE POSSIBLE RISKS OF IDT ?    If the skin reacts it will develop red, itchy  wheals of 5-30mm diameter. The wheal and itch will usually disappear spontaneously after 1-2 hours. Sometimes there might be a delayed reactions after days and this has to be reported to the treating allergy specialist (could be another kind of reaction pattern and important piece of information). Rarely there are more serious generalized allergic reactions observed and therefore you will be under observation of the allergy team during the entire procedure. Only sterile solutions will be used for injections, but there is still a small risk for infections or unpredictable local toxic reactions by the allergens. Some transient bleeding might occur.     _____________________________      ORAL PROVOCATION TEST      WHAT IS AN  ORAL PROVOCATION TEST (OPT) ?    An oral provocation test (OPT) is a procedure primarily used to exclude a specific allergy to a particular drug or food. These substances are then administered orally, rarely in case of drugs by subcutaneous or intravenous injections. This test is only conducted if the skin prick and intradermal and/or patch tests were negatives. A formal written consent will be taken prior to the provocation test.         HOW IS AN ORAL PROVOCATION TEST PERFORMED?    For oral (food/drugs) provocation tests you will have to ingest the food or drug hidden in a capsule or in its natural form. The test will usually be placebo-controlled and will include a capsule or other application form not containing the suspected allergen, but non-reactive Mannitol sugar. The various drugs/food will be given at specific time intervals under strict medical supervision.     Two to six serial doses containing the test food or drug will given at normally 30min time intervals, which might vary for each individual. You will be required to stay at the clinic at all times so that the allergy doctors and nurses can early recognize and treat immediately possible allergic reactions. After the last dose you  have to stay during at least 1h for observation. The entire procedure will take about 2-6hours, depending on the number of incremental doses and the observation time.     WHAT ARE THE POSSIBLE RISKS OF ORAL PROVOCATION TEST ?    The provocation tests have the highest risk potential of all the tests and therefore close observation is mandatory. The entire procedure will be discussed prior to the test (except when the placebo will be given). The reactions can go from local allergic reactions to more severe generalized reactions. Therefore, we will not perform provocation tests without prior evaluation by prick or intradermal tests and we plan to exclude an allergy by this test. If there is a higher risk, the test will be performed in a bed and with a secure intravenous access with infusion. The medication of a severe allergic reactions include high dose corticosteroids, antihistamines and if necessary epinephrine (Epi-Pen).    Useful Contact Information    Change of appointments or allergy-related [phone]  Email: [email or web address]  Location/address: [address] [place] [address]    If you develop any serious or adverse allergic reaction after office hours please seek immediate medical assistance at the nearest clinic or emergency room.     If you have questions or concerns regarding your Allergy Clinic bill or cost of care estimates, please reach out to our financial services at [email or web address]    CPT code for patch testing: CPT-63052 (Contact your insurance provider to ensure coverage)    Customer Service    Ph: [phone] or  [phone]    Hours of operation:   - 8:00am - 5:30pm  F 9:00am - 4:30pm    Cost of Care/Estimates Team    Ph: [phone]    Hours of operation:   -Th 8:00am - 3:00pm  F 9:00am - 3:00pm

## 2025-04-30 NOTE — TELEPHONE ENCOUNTER
PA Initiation    Medication: ADBRY 300 MG/2ML SC SOAJ  Insurance Company: [employer] - Phone [phone] Fax [phone]  Pharmacy Filling the Rx:    Filling Pharmacy Phone:    Filling Pharmacy Fax:    Start Date: 4/30/2025    Key:[id number]

## 2025-05-01 ENCOUNTER — TELEPHONE (OUTPATIENT)
Dept: DERMATOLOGY | Facility: CLINIC | Age: 35
End: 2025-05-01

## 2025-05-01 NOTE — TELEPHONE ENCOUNTER
VIET referral from: [facility] visit (referral by provider)    [facility] referral outreach attempt #2 on May 1, 2025 at 2:35 PM      Outcome: I made 2 call attempts today with an  to schedule this patient. Her line kept breaking up so both the  and I asked for her to confirm her name. She confirmed it was her and claimed we had been calling her every day, then she called us a derogatory name and hung up.     Use HB for the carrier/Plan on the flowsheet      VIET Solorzano

## 2025-05-05 ENCOUNTER — OFFICE VISIT (OUTPATIENT)
Dept: ALLERGY | Facility: CLINIC | Age: 35
End: 2025-05-05

## 2025-05-05 ENCOUNTER — TELEPHONE (OUTPATIENT)
Dept: ALLERGY | Facility: CLINIC | Age: 35
End: 2025-05-05

## 2025-05-05 DIAGNOSIS — L20.89 OTHER ATOPIC DERMATITIS: ICD-10-CM

## 2025-05-05 DIAGNOSIS — L81.0 HYPERPIGMENTATION OF SKIN, POSTINFLAMMATORY: Primary | ICD-10-CM

## 2025-05-05 DIAGNOSIS — L23.5 ALLERGIC DERMATITIS DUE TO OTHER CHEMICAL PRODUCT: ICD-10-CM

## 2025-05-05 PROCEDURE — 95044 PATCH/APPLICATION TESTS: CPT | Performed by: DERMATOLOGY

## 2025-05-05 NOTE — TELEPHONE ENCOUNTER
Standardized panels  [x] Standard panel (40 tests)  [x] Preservatives & Antimicrobials (31 tests)  [x] Emulsifiers & Additives (25 tests)   [x] Perfumes/Flavours & Plants (25 tests)  [] Hairdresser panel (12 tests)  [] Rubber Chemicals (22 tests)  [] Plastics (26 tests)  [] Colorants/Dyes/Food additives (20 tests)  [] Metals (implants/dental) (24 tests)  [] Local anaesthetics/NSAIDs (13 tests)  [] Antibiotics & Antimycotics (14 tests)   [x] Corticosteroids (15 tests)   [] Photopatch test (62 tests)   [] Others:      [] Patient's Own Products:   DO NOT test if chemical or biological identity is unknown!   always ask from patient the product information and safety sheets (MSDS)       STANDARD Series                                          # Substance 2 days 4 days remarks     1 Arnel Mix III 10% - -       2 Colophony - -       3  2-Mercaptobenzothiazole  - -       4 Methylisothiazolinone - -       5 Carba Mix - -       6 Thiuram Mix [A] - -       7 Bisphenol A Epoxy Resin - -       8 P-Igju-Ryghhvuysqj-Formaldehyde Resin - -       9 Mercapto Mix [A] - -       10 Black Rubber Mix- PPD [B] - -       11 Potassium Dichromate  -  -       12 Balsam of [place] (Myroxylon Pereirae Resin) - -       13 Nickel Sulphate Hexahydrate - -       14 Mixed Dialkyl Thiourea - -       15 Paraben Mix [B] - -       16 Methyldibromo Glutaronitrile - -       17 Fragrance Mix 8% - -       18 2-Bromo-2-Nitropropane-1,3-Diol (Bronopol) CT - -       19 Lyral - -       20 Tixocortol-21- Pivalate CT - -       21 Diazolidinyl urea (Germall II) - -        22 Methyl Methacrylate - -       23 Cobalt (II) Chloride Hexahydrate - -       24 Fragrance Mix II  - -       25 Compositae Mix II - -       26 Benzoyl Peroxide - -       27 Bacitracin - -       28 Formaldehyde - -       29 Methylchloroisothiazolinone / Methylisothiazolinone - -       30 Corticosteroid Mix CT - -       31 Sodium Lauryl Sulfate - -       32 Lanolin Alcohol - -       33 Turpentine - -        34 Cetylstearylalcohol - -       35 Chlorhexidine Dicluconate - -       36 Budesonide - -       37 Imidazolidinyl Urea  - -       38 Ethyl-2 Cyanoacrylate - -       39 Quaternium 15 (Dowicil 200) - -       40 Decyl Glucoside - -     Compositae Mix II - common yarrow, mountain arnica, Malay chamomile, feverfew, and the common tansy   PRESERVATIVES & ANTIMICROBIALS        # Substance 2 days 4 days remarks   41 1 1,2-Benzisothiazoline-3-One, Sodium Salt - -     2 1,3,5-Sanjay (2-Hydroxyethyl) - Hexahydrotriazine (Grotan BK) - -     3 Dichlorophene - -     4 3, 4, 4' - Triclocarban - -    45 5 4 - Chloro - 3 - Cresol - -     6 4 - Chloro - 4 - Xylenol (PCMX) - -     7 7-Ethylbicyclooxazolidine (Bioban YX8051) - -     8 Benzalkonium Chloride CT - -     9 Benzyl Alcohol - -    50 10 Cetalkonium Chloride - -     11 Cetylpyrimidine Chloride  - -     12 Chloroacetamide - -     13 DMDM Hydantoin - -     14 Glutaraldehyde - -    55 15 Triclosan - -     16 Glyoxal Trimeric Dihydrate - -     17 Iodopropynyl Butylcarbamate - -     18 Octylisothiazoline - -     19 Acetophenone Azine - -    60 20 Bioban P 1487 (Nitrobutyl) Morpholine/(Ethylnitro-Trimethylene) Dimorpholine - -     21 Phenoxyethanol - -     22 Phenyl Salicylate - -     23 Povidone Iodine - -     24 Sodium Benzoate - -    65 25 Sodium Disulfite - -     26 Sorbic Acid - -     27 Thimerosal - -     28 Melamine Formaldehyde Resin - -     29 Ethylenediamine Dihydrochloride - -      Parabens      70 30 Butyl-P-Hydroxybenzoate - -     31 Ethyl-P-Hydroxybenzoate - -     32 Methyl-P-Hydroxybenzoate - -    73 33 Propyl-P-Hydroxybenzoate - -     EMULSIFIERS & ADDITIVES    # Substance 2 days 4 days remarks   74 1 Polyethylene Glycol-400 - -    75 2 Cocamidopropyl Betaine - -     3 Amerchol L101 - -     4 Propylene Glycol - -     5 Triethanolamine - -     6 Sorbitane Sesquiolate CT - -    80 7 Isopropylmyristate - -     8 Polysorbate 80 CT - -     9 Amidoamine    (Stearamidopropyl Dimethylamine) - -     10 Oleamidopropyl Dimethylamine - -     11 Lauryl Glucoside - -    85 12 Coconut Diethanolamide  - -     13 2-Hydroxy-4-Methoxy Benzophenone (Oxybenzone) - -     14 Benzophenone-4 (Sulisobenzon) - -     15 Propolis - -     16 Dexpanthenol - -    90 17 Abitol (Hydroabietyl Alcohol) - -     18 Tert-Butylhydroquinone - -     19 Benzyl Salicylate - -     20 Dimethylaminopropylamin (DMPA) - -     21 Zinc Pyrithione (Zinc Omadine)  - -    95 22 Sanjay(Hydroxymethyl) Nitromethane  - -      Antioxidant       23 Dodecyl Gallate - -     24 Butylhydroxyanisole (BHA) - -     25 Butylhydroxytoluene (BHT) - -     26 Di-Alpha-Tocopherol (Vit E) - -    100 27 Propyl Gallate - -     PERFUMES, FLAVORS & PLANTS        # Substance 2 days 4 days remarks   101 1 Benzyl Cinnamate - -     2 Di-Limonene (Dipentene) - -     3 Cananga Odorata (Scott Chavez) (I) - -     4 Lichen Acid Mix - -    105 5 Mentha Piperita Oil (Peppermint Oil) - -     6 Sesquiterpenelactone mix - -     7 Tea Tree Oil, Oxidized - -     8 Wood Tar Mix - -     9 Abietic Acid - -    110 10 Lavendula Angustifolia Oil (Lavender Oil) - -     11 Fragrance mix II CT * 14% - -      Fragrance Mix I       12 Oakmoss Absolute - -     13 Eugenol - -     14 Geraniol - -    115 15 Hydroxycitronellal - -     16 Isoeugenol - -     17 Cinnamic Aldehyde - -     18 Cinnamic Alcohol  - -      Fragrance mix II       19 Citronellol - -    120 20 Alpha-Hexylcinnamic Aldehyde    - -     21 Citral - -     22 Farnesol - -    123 23 Coumarin - -    Hexylcinnamic aldehyde, Coumarin, Farnesol, Hydroxyisohexy3-cyclohexene carboxaldehyde, citral, citrolellol  CORTICOSTEROIDS   # Substance 2 days 4 days remarks Allergy  class   124 1 Amcinonide - -  B   125 2 Betametasone-17,21 Dipropionate - -  D1    3 Desoximetasone - -  C    4 Betamethasone-17-Valerate - -  D1    5 Hydrocortisone - -  A    6 Clobetasol-17-Propionate - -  D1   130 7 Dexamethasone-21-Phosphate  Disodium Salt - -  C    8 Hydrocortisone-17 Butyrate - -  D2    9 Prednisolone - -  A    10 Triamcinolone Acetonide - -  B    11 Methylprednisolone Aceponate - -  D2   135 12 Hydrocortisone-21-Acetate - -  A   136 13 Prednicarbate - -  D2     Group Characteristics of group Generic name Name  cross reactions   A Hydrocortisone   Cloprednole, Fludrocortisone acétate, Hydrocortison acetate, Methylprednisolone, Prednisolone, Tixocortolpivalate Alfacortone, Fucidin H, Dermacalm, Hexacortone, Premandole, Imacort With group D2   B Triamcinolone-acetonide   Budenoside (R-isomer), Amcinonide, Desonide, Fluocinolone acetonide, Triamcinolone acetonide Locapred, Locatop  Synalar, Pevisone, Kenacort -   C Betamethasone (Without Bernadette)   Betamethasone, Dexamethasone, Flumethasone pivalate, Halomethasone Daivobet, Dexasalyl, Locasalen,   -   D1 Betamethasone-diproprionate   Betamethasone dipropionate, Betamethasone-17-valerate, Clobetasole-propionate, Fluticasone propionate, Mometasone furoate Betnovate, Diprogenta, Diprosalic, Diprosone, Celestoderm, Fucicort,  Cutivate, Axotide, Elocom -   D2 Methylprednisolone-aceponate   Hydrocortisone-aceponate, Hydrocortisone-buteprate, Hydrocortisone-17-butyrate, Methylprednisolone aceponate, Prednicarbate Locoïd, Advantan,  Prednitop With group A and Budesonide (S-isomer)      Results of patch tests:                         Interpretation:  - Negative                    A    = Allergic      (+) Erythema    TI   = Toxic/irritant   + E + Infiltration    RaP = Relevance at Present     ++ E/I + Papulovesicle   Rpr  = Relevance Previously     +++ E/I/P + Blister     nR   = No Relevance

## 2025-05-05 NOTE — LETTER
5/5/2025      Robert Anand  [address] [facility] [address]      Dear Colleague,    Thank you for referring your patient, Robert Anand, to the [facility] [place]. Please see a copy of my visit note below.    [facility] Dermato-allergology Note  Office visit  Encounter Date: May 5, 2025  ____________________________________________    CC: Allergy Testing Followup (Patch day 1)    HPI:  (May 5, 2025)  Ms. Robert Anand is a(n) 35 year old female who presents today as a return patient for allergy tests as planned  - Follow-up in Derm-Allergy clinic for patch testing as planned   - Otherwise feeling well in usual state of health    Physical Exam:  General: In no acute distress, well-developed, well-nourished  Eyes: no conjunctivitis  ENT: no signs of rhinitis   Pulmonary: no wheezing or coughing  Skin: Focused examination of the skin on test sites was performed = see test results below  No active eczematous skin lesions on tests sites, particularly back    Earlier History and Allergy Exams:  (Apr 30, 2025)  Presents today as new patient for allergy consultation  - Referred by Dr. Carolyn Gray on 2/15/24 for dermatitis.   - 9/14/2023 OV visit with Dr. Carolyn Gray (Derm)  FROM HPI:  Ms. Robert Anand is a(n) 33 year old female who presents today as a return.   She is 6 months pregnant. She stopped all topicals. Is itchy and dark again.     FROM A&P:  # Dermatitis, neck hands, itchy. Pregnant she reports for 6 months  Stop elidel(pimecrolimus and tretinoin if using)     Switch to hydrocortisone 2.5% cream : Apply twice daily for 1 week, then 3 times weekly for 1 week, then repeat, reviewed risks with pregnancy  -reviewed skin thinning     For the body, start vanicream twice daily from the neck down.  -referral Dr. Ortega  -Future-dupixent and return to tacrolimus inhibitor. COnsider new topical eucrisa also     # Acne vulgaris.   #melasma  -pt stopped  tretinoin  -sunscreen    - 2/15/2024 VV visit with Dr. Carolyn Gray (Derm)  FROM A&P:  # Eczematous dermatitis, neck, arms, face with PIH. Patient is breastfeeding. I have never seen in person but  has. - last visit topicals adjusted. Dupixent was considered . Dr. Ortega referral for patch testing  - Lidex for up to 2 weeks per month on trunk and extemities  - ELIDEL BID for face   - Future consideration: Dupixent      # Acne vulgaris  -hold that   # Melasma.  -hold     Skin: Focused examination of the skin on test sites was performed = see test results below  Waist-up skin, which includes the head/face, neck, both arms, chest, back, abdomen, digits and/or nails was examined.  - Generally very dry scaly skin with hyperpigmentation  - Some specials lesion on the neck and fontal hairline, little bit hyperkeratosis     Past Medical History:   Patient Active Problem List   Diagnosis     Normal pregnancy in third trimester     GBS (group B Streptococcus carrier), +RV culture, currently pregnant     Term pregnancy     Gestational hypertension     No past medical history on file.    Allergies:  No Known Allergies    Medications:  Current Outpatient Medications   Medication Sig Dispense Refill     cyclobenzaprine (FLEXERIL) 5 MG tablet Take 1 tablet (5 mg) by mouth 3 times daily as needed for muscle spasms. 60 tablet 1     ibuprofen (ADVIL/MOTRIN) 600 MG tablet Take 1 tablet (600 mg) by mouth every 6 hours as needed for moderate pain. 60 tablet 2     Tralokinumab-ldrm (ADBRY) 300 MG/2ML SOAJ Inject 600 mg subcutaneously See Admin Instructions for 1 dose. followed by 300 mg every other week. 6 mL 0     Tralokinumab-ldrm (ADBRY) 300 MG/2ML SOAJ Inject 300 mg subcutaneously every 14 days. 4 mL 11     triamcinolone (KENALOG) 0.1 % external cream Apply topically 2 times daily. (Not more than 2 weeks) 30 g 0     Current Facility-Administered Medications   Medication Dose Route Frequency Provider Last Rate Last Admin      medroxyPROGESTERone (DEPO-PROVERA) injection 150 mg  150 mg Intramuscular Q90 Days            Previous Labs, Allergy Tests, Dermatopathology, Imaging:  N/a    Referred By: Carolyn Gray MD  [address]  [place] [address]     Allergy Tests:  Past Allergy Test    Family History:  No family history on file.    Social History:  The patient works part-time at [employer].   Patient has the following hobbies or non-occupational exposure: n/a.    Order for Future Allergy Testing:    [x] Outpatient  [] Inpatient: [facility]..../ Bed ...    Skin Atopy (atopic dermatitis)? [x] Yes     [] No  Comments:   - sent by Dr. Gray for recurrent eczema   - has constant itching   - triamcinolone did not really help, caused more itching     Childhood eczema?   [] Yes     [x] No  Comments:   Hand eczema?   [] Yes     [x] No  If yes, leading hand? [] Right     [] Left     [] Ambidextrous  Comments:     Contact allergies?     Comments:   Including adhesives/bandages? [] Yes     [x] No  Comments:   Including metals?   [] Yes     [x] No  Comments:   - does not really wear jewelry  Other substances?   [] Yes     [x] No  Comments:     Drug allergies?   [] Yes     [x] No  Comments:   - stopped flexeril as it induced more itching   - no other medications     Angioedema?   [] Yes     [x] No  Comments:     Urticaria?   [] Yes     [x] No  Comments:     Food allergies?   [] Yes     [x] No  Comments:     Pet allergies?   [] Yes     [x] No  Comments:     Environmental allergy symptoms?  [] Conjunctivitis  [] Otitis  [] Pharyngitis  [] Polyposis  [] Postnasal Drip  [] Rhinitis  [] Sinusitis  [x] None  Comments:     HENT Operations?  [] Adenoids [] Septum [] Sinus  [] Tonsils        [] Other:   [x] None  Comments:     Pulmonary symptoms (from birth to present)?  [] Asthma bronchiale  Inhaler(s)?:   [] Coughing  [] Other  [x] None  Comments:     Environmental and pulmonary symptoms aggravated by?  Season: [x] None     [] I      [] II     [] III     [] IV     [] V     [] VI          [] VII     [] VIII     [] IX     [] X     [] XI     [] XII     [] Perennial  Time of Day: [x] None     [] Morning     [] Noon     [] Evening     [] Night     [] Whole Day  Location/Changes: [x] None     [] Inside     [] Outside     [] Mountain     [] Sea     [] Other:   Triggers (specific): [x] None     [] Animals     [] Dust     [] Mold     [] Plants     [] Other:   Triggers (other): [x] None     [] Psyche     [] Sport     [] Work     [] Other:   Irritant: [x] None     [] Cold     [] Heat     [] Odors     [] Physical Efforts     [] Smoke     [] Other:     Order for PATCH TESTS  Reason for tests (suspected allergy): possible contact allergy  Known previous allergies: none confirmed   Standardized panels  [x] Standard panel (40 tests)  [x] Preservatives & Antimicrobials (31 tests)  [x] Emulsifiers & Additives (25 tests)   [x] Perfumes/Flavours & Plants (25 tests)  [] Hairdresser panel (12 tests)  [] Rubber Chemicals (22 tests)  [] Plastics (26 tests)  [] Colorants/Dyes/Food additives (20 tests)  [] Metals (implants/dental) (24 tests)  [] Local anaesthetics/NSAIDs (13 tests)  [] Antibiotics & Antimycotics (14 tests)   [x] Corticosteroids (15 tests)   [] Photopatch test (62 tests)   [] Others:     [] Patient's Own Products:   DO NOT test if chemical or biological identity is unknown!   always ask from patient the product information and safety sheets (MSDS)       Order for PRICK TESTS    Reason for tests (suspected allergy): atopy?  Known previous allergies: none confirmed     Standardized prick panels  [x] Atopic panel (20 tests)  [] Pediatric Panel (12 tests)  [] Milk, Meat, Eggs, Grains (20 tests)   [] Dust, Epithelia, Feathers (10 tests)  [] Fish, Seafood, Shellfish (17 tests)  [] Nuts, Beans (14 tests)  [] Spice, Vegetable, Fruit (17 tests)  [] Pollen Panel = Tree, Grass, Weed (24 tests)  [] Others:     [] Patient's Own Products:   DO NOT test if chemical or  biological identity is unknown!   always ask from patient the product information and safety sheets (MSDS)     Standardized intradermal tests  [] Alternaria alternata  [] Aspergillus fumigatus  [] Cladosporium herbarum   [] Penicillium notatum  [] Dermatophagoides farinae  [] Dermatophagoides pteronyssinus  [] Dog Epithelium  [] Cat Epithelium  [] Others:     [] Bee venom   [] Wasp venom  !!Specific protocol with dilutions!!       Order for Drug allergy tests (prick & intradermal & patch tests)    [] Penicillin G     [] Ampicillin   [] Cefazolin (1st gen)     [] Cefuroxime (2nd gen)     [] Ceftriaxone (3rd gen)     [] Ceftazidime (3rd gen)     [] Cefepime (4th gen)       [] Bactrim  [] Iodixanol (Visipaque)     [] Iopamidol (Isovue)       [] Iohexol (Omnipaque)  [] Others:   [] Patient's Own:   Order for .... as test date        Atopy Screen (Placed Apr 30, 2025)  No Substance Readings (15 min) Evaluation   POS Histamine 1mg/ml ++    NEG NaCl 0.9% -      No Substance Readings (15 min) Evaluation   1 Alternaria alternata (tenuis)  -    2 Cladosporium herbarum -    3 Aspergillus fumigatus -    4 Penicillium notatum -    5 Dermatophagoides pteronyssinus -    6 Dermatophagoides farinae -    7 Dog epithelium (canis spp) -    8 Cat hair (karla catus) -    9 Cockroach   (Blatella americana & germanica) -    10 Grass mix midwest   (Lora, Orchard, Redtop, Silvino) -    11 Michael grass (sorghum halepense) -    12 Weed mix   (common Cocklebur, Lamb s quarters, rough redroot Pigweed, Dock/Sorrel) -    13 Mug wort (artemisia vulgare) -    14 Ragweed giant/short (ambrosia spp) -    15 White birch (Betula papyrifera) -    16 Tree mix 1 (Pecan, Maple BHR, Oak RVW, american [place], black Elnora) -    17 Red cedar (juniperus virginia) -    18 Tree mix 2   (white Cam, river/red Birch, black [place], common [place], american Elm) -    19 Box elder/Maple mix (acer spp) -    20 [place] shagbark (carya ovata) -            Conclusion  ________________________________      RESULTS & EVALUATION of PATCH TESTS    May 5, 2025 application of patch tests:    Patch test readings after     [x] 2 days, [] 3 days [x] 4 days, [] 5 days,  Other duration: ...    STANDARD Series                                          # Substance 2 days 4 days remarks     1 Arnel Mix III 10% - -       2 Colophony - -       3  2-Mercaptobenzothiazole  - -       4 Methylisothiazolinone - -       5 Carba Mix - -       6 Thiuram Mix [A] - -       7 Bisphenol A Epoxy Resin - -       8 M-Vjyf-Xoerruaytsc-Formaldehyde Resin - -       9 Mercapto Mix [A] - -       10 Black Rubber Mix- PPD [B] - -       11 Potassium Dichromate  -  -       12 Balsam of [place] (Myroxylon Pereirae Resin) - -       13 Nickel Sulphate Hexahydrate - -       14 Mixed Dialkyl Thiourea - -       15 Paraben Mix [B] - -       16 Methyldibromo Glutaronitrile - -       17 Fragrance Mix 8% - -       18 2-Bromo-2-Nitropropane-1,3-Diol (Bronopol) CT - -       19 Lyral - -       20 Tixocortol-21- Pivalate CT - -       21 Diazolidinyl urea (Germall II) - -        22 Methyl Methacrylate - -       23 Cobalt (II) Chloride Hexahydrate - -       24 Fragrance Mix II  - -       25 Compositae Mix II - -       26 Benzoyl Peroxide - -       27 Bacitracin - -       28 Formaldehyde - -       29 Methylchloroisothiazolinone / Methylisothiazolinone - -       30 Corticosteroid Mix CT - -       31 Sodium Lauryl Sulfate - -       32 Lanolin Alcohol - -       33 Turpentine - -       34 Cetylstearylalcohol - -       35 Chlorhexidine Dicluconate - -       36 Budesonide - -       37 Imidazolidinyl Urea  - -       38 Ethyl-2 Cyanoacrylate - -       39 Quaternium 15 (Dowicil 200) - -       40 Decyl Glucoside - -     Compositae Mix II - common yarrow, mountain arnica, Kazakh chamomile, feverfew, and the common tansy   PRESERVATIVES & ANTIMICROBIALS        # Substance 2 days 4 days remarks   41 1 1,2-Benzisothiazoline-3-One, Sodium  Salt - -     2 1,3,5-Sanjay (2-Hydroxyethyl) - Hexahydrotriazine (Grotan BK) - -     3 Dichlorophene - -     4 3, 4, 4' - Triclocarban - -    45 5 4 - Chloro - 3 - Cresol - -     6 4 - Chloro - 4 - Xylenol (PCMX) - -     7 7-Ethylbicyclooxazolidine (Bioban [id number]) - -     8 Benzalkonium Chloride CT - -     9 Benzyl Alcohol - -    50 10 Cetalkonium Chloride - -     11 Cetylpyrimidine Chloride  - -     12 Chloroacetamide - -     13 DMDM Hydantoin - -     14 Glutaraldehyde - -    55 15 Triclosan - -     16 Glyoxal Trimeric Dihydrate - -     17 Iodopropynyl Butylcarbamate - -     18 Octylisothiazoline - -     19 Acetophenone Azine - -    60 20 Bioban P 1487 (Nitrobutyl) Morpholine/(Ethylnitro-Trimethylene) Dimorpholine - -     21 Phenoxyethanol - -     22 Phenyl Salicylate - -     23 Povidone Iodine - -     24 Sodium Benzoate - -    65 25 Sodium Disulfite - -     26 Sorbic Acid - -     27 Thimerosal - -     28 Melamine Formaldehyde Resin - -     29 Ethylenediamine Dihydrochloride - -      Parabens      70 30 Butyl-P-Hydroxybenzoate - -     31 Ethyl-P-Hydroxybenzoate - -     32 Methyl-P-Hydroxybenzoate - -    73 33 Propyl-P-Hydroxybenzoate - -     EMULSIFIERS & ADDITIVES    # Substance 2 days 4 days remarks   74 1 Polyethylene Glycol-400 - -    75 2 Cocamidopropyl Betaine - -     3 Amerchol L101 - -     4 Propylene Glycol - -     5 Triethanolamine - -     6 Sorbitane Sesquiolate CT - -    80 7 Isopropylmyristate - -     8 Polysorbate 80 CT - -     9 Amidoamine   (Stearamidopropyl Dimethylamine) - -     10 Oleamidopropyl Dimethylamine - -     11 Lauryl Glucoside - -    85 12 Coconut Diethanolamide  - -     13 2-Hydroxy-4-Methoxy Benzophenone (Oxybenzone) - -     14 Benzophenone-4 (Sulisobenzon) - -     15 Propolis - -     16 Dexpanthenol - -    90 17 Abitol (Hydroabietyl Alcohol) - -     18 Tert-Butylhydroquinone - -     19 Benzyl Salicylate - -     20 Dimethylaminopropylamin (DMPA) - -     21 Zinc Pyrithione (Zinc Omadine)   - -    95 22 Sanjay(Hydroxymethyl) Nitromethane  - -      Antioxidant       23 Dodecyl Gallate - -     24 Butylhydroxyanisole (BHA) - -     25 Butylhydroxytoluene (BHT) - -     26 Di-Alpha-Tocopherol (Vit E) - -    100 27 Propyl Gallate - -     PERFUMES, FLAVORS & PLANTS        # Substance 2 days 4 days remarks   101 1 Benzyl Cinnamate - -     2 Di-Limonene (Dipentene) - -     3 Cananga Odorata (Scott Chavez) (I) - -     4 Lichen Acid Mix - -    105 5 Mentha Piperita Oil (Peppermint Oil) - -     6 Sesquiterpenelactone mix - -     7 Tea Tree Oil, Oxidized - -     8 Wood Tar Mix - -     9 Abietic Acid - -    110 10 Lavendula Angustifolia Oil (Lavender Oil) - -     11 Fragrance mix II CT * 14% - -      Fragrance Mix I       12 Oakmoss Absolute - -     13 Eugenol - -     14 Geraniol - -    115 15 Hydroxycitronellal - -     16 Isoeugenol - -     17 Cinnamic Aldehyde - -     18 Cinnamic Alcohol  - -      Fragrance mix II       19 Citronellol - -    120 20 Alpha-Hexylcinnamic Aldehyde    - -     21 Citral - -     22 Farnesol - -    123 23 Coumarin - -    Hexylcinnamic aldehyde, Coumarin, Farnesol, Hydroxyisohexy3-cyclohexene carboxaldehyde, citral, citrolellol  CORTICOSTEROIDS   # Substance 2 days 4 days remarks Allergy  class   124 1 Amcinonide - -  B   125 2 Betametasone-17,21 Dipropionate - -  D1    3 Desoximetasone - -  C    4 Betamethasone-17-Valerate - -  D1    5 Hydrocortisone - -  A    6 Clobetasol-17-Propionate - -  D1   130 7 Dexamethasone-21-Phosphate Disodium Salt - -  C    8 Hydrocortisone-17 Butyrate - -  D2    9 Prednisolone - -  A    10 Triamcinolone Acetonide - -  B    11 Methylprednisolone Aceponate - -  D2   135 12 Hydrocortisone-21-Acetate - -  A   136 13 Prednicarbate - -  D2     Group Characteristics of group Generic name Name  cross reactions   A Hydrocortisone   Cloprednole, Fludrocortisone acétate, Hydrocortison acetate, Methylprednisolone, Prednisolone, Tixocortolpivalate Alfacortone, Fucidin H,  Dermacalm, Hexacortone, Premandole, Imacort With group D2   B Triamcinolone-acetonide   Budenoside (R-isomer), Amcinonide, Desonide, Fluocinolone acetonide, Triamcinolone acetonide Locapred, Locatop  Synalar, Pevisone, Kenacort -   C Betamethasone (Without Bernadette)   Betamethasone, Dexamethasone, Flumethasone pivalate, Halomethasone Daivobet, Dexasalyl, Locasalen,   -   D1 Betamethasone-diproprionate   Betamethasone dipropionate, Betamethasone-17-valerate, Clobetasole-propionate, Fluticasone propionate, Mometasone furoate Betnovate, Diprogenta, Diprosalic, Diprosone, Celestoderm, Fucicort,  Cutivate, Axotide, Elocom -   D2 Methylprednisolone-aceponate   Hydrocortisone-aceponate, Hydrocortisone-buteprate, Hydrocortisone-17-butyrate, Methylprednisolone aceponate, Prednicarbate Locoïd, Advantan,  Prednitop With group A and Budesonide (S-isomer)      Results of patch tests:                         Interpretation:  - Negative                    A    = Allergic      (+) Erythema    TI   = Toxic/irritant   + E + Infiltration    RaP = Relevance at Present     ++ E/I + Papulovesicle   Rpr  = Relevance Previously     +++ E/I/P + Blister     nR   = No Relevance     [] No relevant allergic reaction observed    [] Allergic reaction diagnosed against following allergens:      Interpretation/ remarks:   See later    [] Patient information given   [] ACDS CAMP information's (# ....) to following compounds: .....   [] General information's to following compounds: ......      ____________________________________________    Assessment & Plan:    ==> Final Diagnosis:     # Generalized dry skin with hyperpigmented eczematous lesions on head, extremities, and trunk  DDx: atopic dermatitis and/or allergic contact dermatitis  * chronic illness with exacerbation, progression, side effects from treatment    # No clear signs for atopy  Personal and family history for atopy negative   No inhalant allergies   Prick testing negative   * stable  chronic illness    In her labs there were no signs for eosinophilia in peripheral blood and kidney and liver tests were normal. Her TSH was also normal, so there are no signs for thyroid disease.        These conclusions are made at the best of one's knowledge and belief based on the provided evidence such as patient's history and allergy test results and they can change over time or can be incomplete because of missing information.    ==> Treatment Plan:    >> Will update below treatment plan on Friday after 2nd readings and final conclusions:  >> For atopic dermatitis,   >> Discussed initiation of Adbry  - Start Adbry (Tralokinumab-ldrm) 600 mg (loading dose) and 300 mg every 14 days.  Disp-4 mL, R-11  - Referred to [facility] team for assistance.    >> Patient will return to clinic for patch testing to rule out allergic contact allergy.     Procedures Performed: Allergy tests, including patch tests     Staff Involved: Provider, Staff, and Scribe    Scribe Disclosure:   I, Ramírez Jung, am serving as a scribe to document services personally performed by Juve Ortega MD based on data collection and the provider's statements to me.     Staff Physician Comments:  I was present with the scribe who participated in the documentation of the note. I have verified the history and personally performed the physical exam and medical decision making. I agree with the assessment and plan as documented in the note. I have reviewed and if necessary amended the note.      Juve Ortega MD  Professor  Head of Dermato-Allergy Division  Department of Dermatology  [facility]       Follow-up in Derm-Allergy clinic for 1st readings of patch tests after 2 days   ___________________________    I spent a total of 23 minutes with Robert Anand during today s  visit. This time was spent discussing all the individual test results, correlating them to the clinical relevance, counseling the patient  and/or coordinating care and performing allergy tests or procedures.      Again, thank you for allowing me to participate in the care of your patient.        Sincerely,        Juve Ortega MD    Electronically signed

## 2025-05-05 NOTE — NURSING NOTE
Chief Complaint   Patient presents with    Allergy Testing Followup     Patch day 1     Janine Bustamante RN

## 2025-05-05 NOTE — PROGRESS NOTES
[facility] Dermato-allergology Note  Office visit  Encounter Date: May 5, 2025  ____________________________________________    CC: Allergy Testing Followup (Patch day 1)    HPI:  (May 5, 2025)  Ms. Robert Anand is a(n) 35 year old female who presents today as a return patient for allergy tests as planned  - Follow-up in Derm-Allergy clinic for patch testing as planned   - Otherwise feeling well in usual state of health    Physical Exam:  General: In no acute distress, well-developed, well-nourished  Eyes: no conjunctivitis  ENT: no signs of rhinitis   Pulmonary: no wheezing or coughing  Skin: Focused examination of the skin on test sites was performed = see test results below  No active eczematous skin lesions on tests sites, particularly back    Earlier History and Allergy Exams:  (Apr 30, 2025)  Presents today as new patient for allergy consultation  - Referred by Dr. Carolyn Gray on 2/15/24 for dermatitis.   - 9/14/2023 OV visit with Dr. Carolyn Gray (Derm)  FROM HPI:  Ms. Robert Anand is a(n) 33 year old female who presents today as a return.   She is 6 months pregnant. She stopped all topicals. Is itchy and dark again.     FROM A&P:  # Dermatitis, neck hands, itchy. Pregnant she reports for 6 months  Stop elidel(pimecrolimus and tretinoin if using)     Switch to hydrocortisone 2.5% cream : Apply twice daily for 1 week, then 3 times weekly for 1 week, then repeat, reviewed risks with pregnancy  -reviewed skin thinning     For the body, start vanicream twice daily from the neck down.  -referral Dr. Ortega  -Future-dupixent and return to tacrolimus inhibitor. COnsider new topical eucrisa also     # Acne vulgaris.   #melasma  -pt stopped tretinoin  -sunscreen    - 2/15/2024 VV visit with Dr. Carolyn Gray (Derm)  FROM A&P:  # Eczematous dermatitis, neck, arms, face with PIH. Patient is breastfeeding. I have never seen in person but  has. - last visit topicals adjusted. Dupixent  was considered . Dr. Ortega referral for patch testing  - Lidex for up to 2 weeks per month on trunk and extemities  - ELIDEL BID for face   - Future consideration: Dupixent      # Acne vulgaris  -hold that   # Melasma.  -hold     Skin: Focused examination of the skin on test sites was performed = see test results below  Waist-up skin, which includes the head/face, neck, both arms, chest, back, abdomen, digits and/or nails was examined.  - Generally very dry scaly skin with hyperpigmentation  - Some specials lesion on the neck and fontal hairline, little bit hyperkeratosis     Past Medical History:   Patient Active Problem List   Diagnosis    Normal pregnancy in third trimester    GBS (group B Streptococcus carrier), +RV culture, currently pregnant    Term pregnancy    Gestational hypertension     No past medical history on file.    Allergies:  No Known Allergies    Medications:  Current Outpatient Medications   Medication Sig Dispense Refill    cyclobenzaprine (FLEXERIL) 5 MG tablet Take 1 tablet (5 mg) by mouth 3 times daily as needed for muscle spasms. 60 tablet 1    ibuprofen (ADVIL/MOTRIN) 600 MG tablet Take 1 tablet (600 mg) by mouth every 6 hours as needed for moderate pain. 60 tablet 2    Tralokinumab-ldrm (ADBRY) 300 MG/2ML SOAJ Inject 600 mg subcutaneously See Admin Instructions for 1 dose. followed by 300 mg every other week. 6 mL 0    Tralokinumab-ldrm (ADBRY) 300 MG/2ML SOAJ Inject 300 mg subcutaneously every 14 days. 4 mL 11    triamcinolone (KENALOG) 0.1 % external cream Apply topically 2 times daily. (Not more than 2 weeks) 30 g 0     Current Facility-Administered Medications   Medication Dose Route Frequency Provider Last Rate Last Admin    medroxyPROGESTERone (DEPO-PROVERA) injection 150 mg  150 mg Intramuscular Q90 Days            Previous Labs, Allergy Tests, Dermatopathology, Imaging:  N/a    Referred By: Carolyn Gray MD  [address] [facility] [address]  [place] [address]     Allergy  Tests:  Past Allergy Test    Family History:  No family history on file.    Social History:  The patient works part-time at [email or web address].   Patient has the following hobbies or non-occupational exposure: n/a.    Order for Future Allergy Testing:    [x] Outpatient  [] Inpatient: [facility]..../ Bed ...    Skin Atopy (atopic dermatitis)? [x] Yes     [] No  Comments:   - sent by Dr. Gray for recurrent eczema   - has constant itching   - triamcinolone did not really help, caused more itching     Childhood eczema?   [] Yes     [x] No  Comments:   Hand eczema?   [] Yes     [x] No  If yes, leading hand? [] Right     [] Left     [] Ambidextrous  Comments:     Contact allergies?     Comments:   Including adhesives/bandages? [] Yes     [x] No  Comments:   Including metals?   [] Yes     [x] No  Comments:   - does not really wear jewelry  Other substances?   [] Yes     [x] No  Comments:     Drug allergies?   [] Yes     [x] No  Comments:   - stopped flexeril as it induced more itching   - no other medications     Angioedema?   [] Yes     [x] No  Comments:     Urticaria?   [] Yes     [x] No  Comments:     Food allergies?   [] Yes     [x] No  Comments:     Pet allergies?   [] Yes     [x] No  Comments:     Environmental allergy symptoms?  [] Conjunctivitis  [] Otitis  [] Pharyngitis  [] Polyposis  [] Postnasal Drip  [] Rhinitis  [] Sinusitis  [x] None  Comments:     HENT Operations?  [] Adenoids [] Septum [] Sinus  [] Tonsils        [] Other:   [x] None  Comments:     Pulmonary symptoms (from birth to present)?  [] Asthma bronchiale  Inhaler(s)?:   [] Coughing  [] Other  [x] None  Comments:     Environmental and pulmonary symptoms aggravated by?  Season: [x] None     [] I     [] II     [] III     [] IV     [] V     [] VI          [] VII     [] VIII     [] IX     [] X     [] XI     [] XII     [] Perennial  Time of Day: [x] None     [] Morning     [] Noon     [] Evening     [] Night     [] Whole Day  Location/Changes: [x]  None     [] Inside     [] Outside     [] Mountain     [] Sea     [] Other:   Triggers (specific): [x] None     [] Animals     [] Dust     [] Mold     [] Plants     [] Other:   Triggers (other): [x] None     [] Psyche     [] Sport     [] Work     [] Other:   Irritant: [x] None     [] Cold     [] Heat     [] Odors     [] Physical Efforts     [] Smoke     [] Other:     Order for PATCH TESTS  Reason for tests (suspected allergy): possible contact allergy  Known previous allergies: none confirmed   Standardized panels  [x] Standard panel (40 tests)  [x] Preservatives & Antimicrobials (31 tests)  [x] Emulsifiers & Additives (25 tests)   [x] Perfumes/Flavours & Plants (25 tests)  [] Hairdresser panel (12 tests)  [] Rubber Chemicals (22 tests)  [] Plastics (26 tests)  [] Colorants/Dyes/Food additives (20 tests)  [] Metals (implants/dental) (24 tests)  [] Local anaesthetics/NSAIDs (13 tests)  [] Antibiotics & Antimycotics (14 tests)   [x] Corticosteroids (15 tests)   [] Photopatch test (62 tests)   [] Others:     [] Patient's Own Products:   DO NOT test if chemical or biological identity is unknown!   always ask from patient the product information and safety sheets (MSDS)       Order for PRICK TESTS    Reason for tests (suspected allergy): atopy?  Known previous allergies: none confirmed     Standardized prick panels  [x] Atopic panel (20 tests)  [] Pediatric Panel (12 tests)  [] Milk, Meat, Eggs, Grains (20 tests)   [] Dust, Epithelia, Feathers (10 tests)  [] Fish, Seafood, Shellfish (17 tests)  [] Nuts, Beans (14 tests)  [] Spice, Vegetable, Fruit (17 tests)  [] Pollen Panel = Tree, Grass, Weed (24 tests)  [] Others:     [] Patient's Own Products:   DO NOT test if chemical or biological identity is unknown!   always ask from patient the product information and safety sheets (MSDS)     Standardized intradermal tests  [] Alternaria alternata  [] Aspergillus fumigatus  [] Cladosporium herbarum   [] Penicillium notatum  []  Dermatophagoides farinae  [] Dermatophagoides pteronyssinus  [] Dog Epithelium  [] Cat Epithelium  [] Others:     [] Bee venom   [] Wasp venom  !!Specific protocol with dilutions!!       Order for Drug allergy tests (prick & intradermal & patch tests)    [] Penicillin G     [] Ampicillin   [] Cefazolin (1st gen)     [] Cefuroxime (2nd gen)     [] Ceftriaxone (3rd gen)     [] Ceftazidime (3rd gen)     [] Cefepime (4th gen)       [] Bactrim  [] Iodixanol (Visipaque)     [] Iopamidol (Isovue)       [] Iohexol (Omnipaque)  [] Others:   [] Patient's Own:   Order for .... as test date        Atopy Screen (Placed Apr 30, 2025)  No Substance Readings (15 min) Evaluation   POS Histamine 1mg/ml ++    NEG NaCl 0.9% -      No Substance Readings (15 min) Evaluation   1 Alternaria alternata (tenuis)  -    2 Cladosporium herbarum -    3 Aspergillus fumigatus -    4 Penicillium notatum -    5 Dermatophagoides pteronyssinus -    6 Dermatophagoides farinae -    7 Dog epithelium (canis spp) -    8 Cat hair (karla catus) -    9 Cockroach   (Blatella americana & germanica) -    10 Grass mix midwest   (Lora, Orchard, Redtop, Silvino) -    11 Michael grass (sorghum halepense) -    12 Weed mix   (common Cocklebur, Lamb s quarters, rough redroot Pigweed, Dock/Sorrel) -    13 Mug wort (artemisia vulgare) -    14 Ragweed giant/short (ambrosia spp) -    15 White birch (Betula papyrifera) -    16 Tree mix 1 (Pecan, Maple BHR, Oak RVW, american [place], black [place]) -    17 Red cedar (juniperus virginia) -    18 Tree mix 2   (white Cam, river/red Birch, black [place], common [place], american Elm) -    19 Box elder/Maple mix (acer spp) -    20 Quinton shagbark (carya ovata) -           Conclusion  ________________________________      RESULTS & EVALUATION of PATCH TESTS    May 5, 2025 application of patch tests:    Patch test readings after     [x] 2 days, [] 3 days [x] 4 days, [] 5 days,  Other duration: ...    STANDARD Series                                           # Substance 2 days 4 days remarks     1 Arnel Mix III 10% - -       2 Colophony - -       3  2-Mercaptobenzothiazole  - -       4 Methylisothiazolinone - -       5 Carba Mix - -       6 Thiuram Mix [A] - -       7 Bisphenol A Epoxy Resin - -       8 S-Rgvh-Nebkmwmvcbm-Formaldehyde Resin - -       9 Mercapto Mix [A] - -       10 Black Rubber Mix- PPD [B] - -       11 Potassium Dichromate  -  -       12 Balsam of [place] (Myroxylon Pereirae Resin) - -       13 Nickel Sulphate Hexahydrate - -       14 Mixed Dialkyl Thiourea - -       15 Paraben Mix [B] - -       16 Methyldibromo Glutaronitrile - -       17 Fragrance Mix 8% - -       18 2-Bromo-2-Nitropropane-1,3-Diol (Bronopol) CT - -       19 Lyral - -       20 Tixocortol-21- Pivalate CT - -       21 Diazolidinyl urea (Germall II) - -        22 Methyl Methacrylate - -       23 Cobalt (II) Chloride Hexahydrate - -       24 Fragrance Mix II  - -       25 Compositae Mix II - -       26 Benzoyl Peroxide - -       27 Bacitracin - -       28 Formaldehyde - -       29 Methylchloroisothiazolinone / Methylisothiazolinone - -       30 Corticosteroid Mix CT - -       31 Sodium Lauryl Sulfate - -       32 Lanolin Alcohol - -       33 Turpentine - -       34 Cetylstearylalcohol - -       35 Chlorhexidine Dicluconate - -       36 Budesonide - -       37 Imidazolidinyl Urea  - -       38 Ethyl-2 Cyanoacrylate - -       39 Quaternium 15 (Dowicil 200) - -       40 Decyl Glucoside - -     Compositae Mix II - common yarrow, mountain arnica, Portuguese chamomile, feverfew, and the common tansy   PRESERVATIVES & ANTIMICROBIALS        # Substance 2 days 4 days remarks   41 1 1,2-Benzisothiazoline-3-One, Sodium Salt - -     2 1,3,5-Sanjay (2-Hydroxyethyl) - Hexahydrotriazine (Grotan BK) - -     3 Dichlorophene - -     4 3, 4, 4' - Triclocarban - -    45 5 4 - Chloro - 3 - Cresol - -     6 4 - Chloro - 4 - Xylenol (PCMX) - -     7 7-Ethylbicyclooxazolidine ([id number]) -  -     8 Benzalkonium Chloride CT - -     9 Benzyl Alcohol - -    50 10 Cetalkonium Chloride - -     11 Cetylpyrimidine Chloride  - -     12 Chloroacetamide - -     13 DMDM Hydantoin - -     14 Glutaraldehyde - -    55 15 Triclosan - -     16 Glyoxal Trimeric Dihydrate - -     17 Iodopropynyl Butylcarbamate - -     18 Octylisothiazoline - -     19 Acetophenone Azine - -    60 20 Bioban P 1487 (Nitrobutyl) Morpholine/(Ethylnitro-Trimethylene) Dimorpholine - -     21 Phenoxyethanol - -     22 Phenyl Salicylate - -     23 Povidone Iodine - -     24 Sodium Benzoate - -    65 25 Sodium Disulfite - -     26 Sorbic Acid - -     27 Thimerosal - -     28 Melamine Formaldehyde Resin - -     29 Ethylenediamine Dihydrochloride - -      Parabens      70 30 Butyl-P-Hydroxybenzoate - -     31 Ethyl-P-Hydroxybenzoate - -     32 Methyl-P-Hydroxybenzoate - -    73 33 Propyl-P-Hydroxybenzoate - -     EMULSIFIERS & ADDITIVES    # Substance 2 days 4 days remarks   74 1 Polyethylene Glycol-400 - -    75 2 Cocamidopropyl Betaine - -     3 Amerchol L101 - -     4 Propylene Glycol - -     5 Triethanolamine - -     6 Sorbitane Sesquiolate CT - -    80 7 Isopropylmyristate - -     8 Polysorbate 80 CT - -     9 Amidoamine   (Stearamidopropyl Dimethylamine) - -     10 Oleamidopropyl Dimethylamine - -     11 Lauryl Glucoside - -    85 12 Coconut Diethanolamide  - -     13 2-Hydroxy-4-Methoxy Benzophenone (Oxybenzone) - -     14 Benzophenone-4 (Sulisobenzon) - -     15 Propolis - -     16 Dexpanthenol - -    90 17 Abitol (Hydroabietyl Alcohol) - -     18 Tert-Butylhydroquinone - -     19 Benzyl Salicylate - -     20 Dimethylaminopropylamin (DMPA) - -     21 Zinc Pyrithione (Zinc Omadine)  - -    95 22 Sanjay(Hydroxymethyl) Nitromethane  - -      Antioxidant       23 Dodecyl Gallate - -     24 Butylhydroxyanisole (BHA) - -     25 Butylhydroxytoluene (BHT) - -     26 Di-Alpha-Tocopherol (Vit E) - -    100 27 Propyl Gallate - -     PERFUMES, FLAVORS &  PLANTS        # Substance 2 days 4 days remarks   101 1 Benzyl Cinnamate - -     2 Di-Limonene (Dipentene) - -     3 Cananga Odorata (Scott Chavez) (I) - -     4 Lichen Acid Mix - -    105 5 Mentha Piperita Oil (Peppermint Oil) - -     6 Sesquiterpenelactone mix - -     7 Tea Tree Oil, Oxidized - -     8 Wood Tar Mix - -     9 Abietic Acid - -    110 10 Lavendula Angustifolia Oil (Lavender Oil) - -     11 Fragrance mix II CT * 14% - -      Fragrance Mix I       12 Oakmoss Absolute - -     13 Eugenol - -     14 Geraniol - -    115 15 Hydroxycitronellal - -     16 Isoeugenol - -     17 Cinnamic Aldehyde - -     18 Cinnamic Alcohol  - -      Fragrance mix II       19 Citronellol - -    120 20 Alpha-Hexylcinnamic Aldehyde    - -     21 Citral - -     22 Farnesol - -    123 23 Coumarin - -    Hexylcinnamic aldehyde, Coumarin, Farnesol, Hydroxyisohexy3-cyclohexene carboxaldehyde, citral, citrolellol  CORTICOSTEROIDS   # Substance 2 days 4 days remarks Allergy  class   124 1 Amcinonide - -  B   125 2 Betametasone-17,21 Dipropionate - -  D1    3 Desoximetasone - -  C    4 Betamethasone-17-Valerate - -  D1    5 Hydrocortisone - -  A    6 Clobetasol-17-Propionate - -  D1   130 7 Dexamethasone-21-Phosphate Disodium Salt - -  C    8 Hydrocortisone-17 Butyrate - -  D2    9 Prednisolone - -  A    10 Triamcinolone Acetonide - -  B    11 Methylprednisolone Aceponate - -  D2   135 12 Hydrocortisone-21-Acetate - -  A   136 13 Prednicarbate - -  D2     Group Characteristics of group Generic name Name  cross reactions   A Hydrocortisone   Cloprednole, Fludrocortisone acétate, Hydrocortison acetate, Methylprednisolone, Prednisolone, Tixocortolpivalate Alfacortone, Fucidin H, Dermacalm, Hexacortone, Premandole, Imacort With group D2   B Triamcinolone-acetonide   Budenoside (R-isomer), Amcinonide, Desonide, Fluocinolone acetonide, Triamcinolone acetonide Locapred, Locatop  Synalar, Pevisone, Kenacort -   C Betamethasone (Without Bernadette)    Betamethasone, Dexamethasone, Flumethasone pivalate, Halomethasone Daivobet, Dexasalyl, Locasalen,   -   D1 Betamethasone-diproprionate   Betamethasone dipropionate, Betamethasone-17-valerate, Clobetasole-propionate, Fluticasone propionate, Mometasone furoate Betnovate, Diprogenta, Diprosalic, Diprosone, Celestoderm, Fucicort,  Cutivate, Axotide, Elocom -   D2 Methylprednisolone-aceponate   Hydrocortisone-aceponate, Hydrocortisone-buteprate, Hydrocortisone-17-butyrate, Methylprednisolone aceponate, Prednicarbate Locoïd, Advantan,  Prednitop With group A and Budesonide (S-isomer)      Results of patch tests:                         Interpretation:  - Negative                    A    = Allergic      (+) Erythema    TI   = Toxic/irritant   + E + Infiltration    RaP = Relevance at Present     ++ E/I + Papulovesicle   Rpr  = Relevance Previously     +++ E/I/P + Blister     nR   = No Relevance     [] No relevant allergic reaction observed    [] Allergic reaction diagnosed against following allergens:      Interpretation/ remarks:   See later    [] Patient information given   [] ACDS CAMP information's (# ....) to following compounds: .....   [] General information's to following compounds: ......      ____________________________________________    Assessment & Plan:    ==> Final Diagnosis:     # Generalized dry skin with hyperpigmented eczematous lesions on head, extremities, and trunk  DDx: atopic dermatitis and/or allergic contact dermatitis  * chronic illness with exacerbation, progression, side effects from treatment    # No clear signs for atopy  Personal and family history for atopy negative   No inhalant allergies   Prick testing negative   * stable chronic illness    In her labs there were no signs for eosinophilia in peripheral blood and kidney and liver tests were normal. Her TSH was also normal, so there are no signs for thyroid disease.        These conclusions are made at the best of one's knowledge and  belief based on the provided evidence such as patient's history and allergy test results and they can change over time or can be incomplete because of missing information.    ==> Treatment Plan:    >> Will update below treatment plan on Friday after 2nd readings and final conclusions:  >> For atopic dermatitis,   >> Discussed initiation of Adbry  - Start Adbry (Tralokinumab-ldrm) 600 mg (loading dose) and 300 mg every 14 days.  Disp-4 mL, R-11  - Referred to MTM team for assistance.    >> Patient will return to clinic for patch testing to rule out allergic contact allergy.     Procedures Performed: Allergy tests, including patch tests     Staff Involved: Provider, Staff, and Scribe    Scribe Disclosure:   I, Ramírez Jung, am serving as a scribe to document services personally performed by Juve Ortega MD based on data collection and the provider's statements to me.     Staff Physician Comments:  I was present with the scribe who participated in the documentation of the note. I have verified the history and personally performed the physical exam and medical decision making. I agree with the assessment and plan as documented in the note. I have reviewed and if necessary amended the note.      Juve Ortega MD  Professor  Head of Dermato-Allergy Division  Department of Dermatology  [facility]       Follow-up in Derm-Allergy clinic for 1st readings of patch tests after 2 days   ___________________________    I spent a total of 23 minutes with Robert Anand during today s  visit. This time was spent discussing all the individual test results, correlating them to the clinical relevance, counseling the patient and/or coordinating care and performing allergy tests or procedures.

## 2025-05-06 NOTE — TELEPHONE ENCOUNTER
Prior Authorization Approval    Medication: ADBRY 300 MG/2ML SC SOAJ  Authorization Effective Date: 5/2/2025  Authorization Expiration Date: 6/1/2025  Approved Dose/Quantity: 6ml/28 days loading dose and 4ml/28 days maintenance dose  Reference #: [id number]   Insurance Company: [email or web address] - Phone [phone] Fax [phone]  Expected CoPay: $ 1,153  CoPay Card Available:      Financial Assistance Needed: sent my chart message  Which Pharmacy is filling the prescription: [facility] - [place], [place] - [address]   Pharmacy Notified: prescriptions were released to the pharmacy  Patient Notified: sent my chart message to patient

## 2025-05-07 ENCOUNTER — OFFICE VISIT (OUTPATIENT)
Dept: ALLERGY | Facility: CLINIC | Age: 35
End: 2025-05-07

## 2025-05-07 DIAGNOSIS — L20.89 OTHER ATOPIC DERMATITIS: Primary | ICD-10-CM

## 2025-05-07 DIAGNOSIS — L81.0 HYPERPIGMENTATION OF SKIN, POSTINFLAMMATORY: ICD-10-CM

## 2025-05-07 DIAGNOSIS — L23.5 ALLERGIC DERMATITIS DUE TO OTHER CHEMICAL PRODUCT: ICD-10-CM

## 2025-05-07 NOTE — PROGRESS NOTES
[facility] Dermato-allergology Note  Office visit  Encounter Date: May 7, 2025  ____________________________________________    CC: Allergy Testing Followup (Patch day 3)    HPI:  (May 7, 2025)  Ms. Robert Anand is a(n) 35 year old female who presents today as a return patient for allergy tests as planned  - Follow-up in Derm-Allergy clinic for 1st readings of patch tests after 2 days  - Otherwise feeling well in usual state of health    Physical Exam:  General: In no acute distress, well-developed, well-nourished  Eyes: no conjunctivitis  ENT: no signs of rhinitis   Pulmonary: no wheezing or coughing  Skin: Focused examination of the skin on test sites was performed = see test results below  No active eczematous skin lesions on tests sites, particularly back    Earlier History and Allergy Exams:  (May 5, 2025)  Presents today as a return patient for allergy tests as planned  - Follow-up in Derm-Allergy clinic for patch testing as planned     (Apr 30, 2025)  Presents today as new patient for allergy consultation  - Referred by Dr. Carolyn Gray on 2/15/24 for dermatitis.   - 9/14/2023 OV visit with Dr. Carolyn Gray (Derm)  FROM HPI:  Ms. Robert Anand is a(n) 33 year old female who presents today as a return.   She is 6 months pregnant. She stopped all topicals. Is itchy and dark again.     FROM A&P:  # Dermatitis, neck hands, itchy. Pregnant she reports for 6 months  Stop elidel(pimecrolimus and tretinoin if using)     Switch to hydrocortisone 2.5% cream : Apply twice daily for 1 week, then 3 times weekly for 1 week, then repeat, reviewed risks with pregnancy  -reviewed skin thinning     For the body, start vanicream twice daily from the neck down.  -referral Dr. Ortega  -Future-dupixent and return to tacrolimus inhibitor. COnsider new topical eucrisa also     # Acne vulgaris.   #melasma  -pt stopped tretinoin  -sunscreen    - 2/15/2024 VV visit with Dr. Carolyn Gray (Derm)  FROM A&P:  #  Eczematous dermatitis, neck, arms, face with PIH. Patient is breastfeeding. I have never seen in person but  has. - last visit topicals adjusted. Dupixent was considered . Dr. Ortega referral for patch testing  - Lidex for up to 2 weeks per month on trunk and extemities  - ELIDEL BID for face   - Future consideration: Dupixent      # Acne vulgaris  -hold that   # Melasma.  -hold     Skin: Focused examination of the skin on test sites was performed = see test results below  Waist-up skin, which includes the head/face, neck, both arms, chest, back, abdomen, digits and/or nails was examined.  - Generally very dry scaly skin with hyperpigmentation  - Some specials lesion on the neck and fontal hairline, little bit hyperkeratosis     Past Medical History:   Patient Active Problem List   Diagnosis    Normal pregnancy in third trimester    GBS (group B Streptococcus carrier), +RV culture, currently pregnant    Term pregnancy    Gestational hypertension     No past medical history on file.    Allergies:  No Known Allergies    Medications:  Current Outpatient Medications   Medication Sig Dispense Refill    cyclobenzaprine (FLEXERIL) 5 MG tablet Take 1 tablet (5 mg) by mouth 3 times daily as needed for muscle spasms. 60 tablet 1    ibuprofen (ADVIL/MOTRIN) 600 MG tablet Take 1 tablet (600 mg) by mouth every 6 hours as needed for moderate pain. 60 tablet 2    Tralokinumab-ldrm (ADBRY) 300 MG/2ML SOAJ Inject 600 mg subcutaneously See Admin Instructions for 1 dose. followed by 300 mg every other week. 6 mL 0    Tralokinumab-ldrm (ADBRY) 300 MG/2ML SOAJ Inject 300 mg subcutaneously every 14 days. 4 mL 11    triamcinolone (KENALOG) 0.1 % external cream Apply topically 2 times daily. (Not more than 2 weeks) 30 g 0     Current Facility-Administered Medications   Medication Dose Route Frequency Provider Last Rate Last Admin    medroxyPROGESTERone (DEPO-PROVERA) injection 150 mg  150 mg Intramuscular Q90 Days             Previous Labs, Allergy Tests, Dermatopathology, Imaging:  N/a    Referred By: Carolyn Gray MD  [address]  [place] [address]     Allergy Tests:  Past Allergy Test    Family History:  No family history on file.    Social History:  The patient works part-time at [employer].   Patient has the following hobbies or non-occupational exposure: n/a.    Order for Future Allergy Testing:    [x] Outpatient  [] Inpatient: [facility]..../ Bed ...    Skin Atopy (atopic dermatitis)? [x] Yes     [] No  Comments:   - sent by Dr. Gray for recurrent eczema   - has constant itching   - triamcinolone did not really help, caused more itching     Childhood eczema?   [] Yes     [x] No  Comments:   Hand eczema?   [] Yes     [x] No  If yes, leading hand? [] Right     [] Left     [] Ambidextrous  Comments:     Contact allergies?     Comments:   Including adhesives/bandages? [] Yes     [x] No  Comments:   Including metals?   [] Yes     [x] No  Comments:   - does not really wear jewelry  Other substances?   [] Yes     [x] No  Comments:     Drug allergies?   [] Yes     [x] No  Comments:   - stopped flexeril as it induced more itching   - no other medications     Angioedema?   [] Yes     [x] No  Comments:     Urticaria?   [] Yes     [x] No  Comments:     Food allergies?   [] Yes     [x] No  Comments:     Pet allergies?   [] Yes     [x] No  Comments:     Environmental allergy symptoms?  [] Conjunctivitis  [] Otitis  [] Pharyngitis  [] Polyposis  [] Postnasal Drip  [] Rhinitis  [] Sinusitis  [x] None  Comments:     HENT Operations?  [] Adenoids [] Septum [] Sinus  [] Tonsils        [] Other:   [x] None  Comments:     Pulmonary symptoms (from birth to present)?  [] Asthma bronchiale  Inhaler(s)?:   [] Coughing  [] Other  [x] None  Comments:     Environmental and pulmonary symptoms aggravated by?  Season: [x] None     [] I     [] II     [] III     [] IV     [] V     [] VI          [] VII     [] VIII     [] IX      [] X     [] XI     [] XII     [] Perennial  Time of Day: [x] None     [] Morning     [] Noon     [] Evening     [] Night     [] Whole Day  Location/Changes: [x] None     [] Inside     [] Outside     [] Mountain     [] Sea     [] Other:   Triggers (specific): [x] None     [] Animals     [] Dust     [] Mold     [] Plants     [] Other:   Triggers (other): [x] None     [] Psyche     [] Sport     [] Work     [] Other:   Irritant: [x] None     [] Cold     [] Heat     [] Odors     [] Physical Efforts     [] Smoke     [] Other:     Order for PATCH TESTS  Reason for tests (suspected allergy): possible contact allergy  Known previous allergies: none confirmed   Standardized panels  [x] Standard panel (40 tests)  [x] Preservatives & Antimicrobials (31 tests)  [x] Emulsifiers & Additives (25 tests)   [x] Perfumes/Flavours & Plants (25 tests)  [] Hairdresser panel (12 tests)  [] Rubber Chemicals (22 tests)  [] Plastics (26 tests)  [] Colorants/Dyes/Food additives (20 tests)  [] Metals (implants/dental) (24 tests)  [] Local anaesthetics/NSAIDs (13 tests)  [] Antibiotics & Antimycotics (14 tests)   [x] Corticosteroids (15 tests)   [] Photopatch test (62 tests)   [] Others:     [] Patient's Own Products:   DO NOT test if chemical or biological identity is unknown!   always ask from patient the product information and safety sheets (MSDS)       Order for PRICK TESTS    Reason for tests (suspected allergy): atopy?  Known previous allergies: none confirmed     Standardized prick panels  [x] Atopic panel (20 tests)  [] Pediatric Panel (12 tests)  [] Milk, Meat, Eggs, Grains (20 tests)   [] Dust, Epithelia, Feathers (10 tests)  [] Fish, Seafood, Shellfish (17 tests)  [] Nuts, Beans (14 tests)  [] Spice, Vegetable, Fruit (17 tests)  [] Pollen Panel = Tree, Grass, Weed (24 tests)  [] Others:     [] Patient's Own Products:   DO NOT test if chemical or biological identity is unknown!   always ask from patient the product information and  safety sheets (MSDS)     Standardized intradermal tests  [] Alternaria alternata  [] Aspergillus fumigatus  [] Cladosporium herbarum   [] Penicillium notatum  [] Dermatophagoides farinae  [] Dermatophagoides pteronyssinus  [] Dog Epithelium  [] Cat Epithelium  [] Others:     [] Bee venom   [] Wasp venom  !!Specific protocol with dilutions!!       Order for Drug allergy tests (prick & intradermal & patch tests)    [] Penicillin G     [] Ampicillin   [] Cefazolin (1st gen)     [] Cefuroxime (2nd gen)     [] Ceftriaxone (3rd gen)     [] Ceftazidime (3rd gen)     [] Cefepime (4th gen)       [] Bactrim  [] Iodixanol (Visipaque)     [] Iopamidol (Isovue)       [] Iohexol (Omnipaque)  [] Others:   [] Patient's Own:   Order for .... as test date        Atopy Screen (Placed Apr 30, 2025)  No Substance Readings (15 min) Evaluation   POS Histamine 1mg/ml ++    NEG NaCl 0.9% -      No Substance Readings (15 min) Evaluation   1 Alternaria alternata (tenuis)  -    2 Cladosporium herbarum -    3 Aspergillus fumigatus -    4 Penicillium notatum -    5 Dermatophagoides pteronyssinus -    6 Dermatophagoides farinae -    7 Dog epithelium (canis spp) -    8 Cat hair (karla catus) -    9 Cockroach   (Blatella americana & germanica) -    10 Grass mix midwest   (Lora, Orchard, Redtop, Silvino) -    11 Michael grass (sorghum halepense) -    12 Weed mix   (common Cocklebur, Lamb s quarters, rough redroot Pigweed, Dock/Sorrel) -    13 Mug wort (artemisia vulgare) -    14 Ragweed giant/short (ambrosia spp) -    15 White birch (Betula papyrifera) -    16 Tree mix 1 (Pecan, Maple BHR, Oak RVW, american Harper, black La Verne) -    17 Red cedar (juniperus virginia) -    18 Tree mix 2   (white Cam, river/red Birch, black Newfield, common Maury, american Elm) -    19 Box elder/Maple mix (acer spp) -    20 Jayuya shagbark (carya ovata) -           Conclusion  ________________________________      RESULTS & EVALUATION of PATCH TESTS    May 5,  2025 application of patch tests:    Patch test readings after     [x] 2 days, [] 3 days [x] 4 days, [] 5 days,  Other duration: ...    STANDARD Series                                          # Substance 2 days 4 days remarks     1 Arnel Mix III 10% - -       2 Colophony - -       3  2-Mercaptobenzothiazole  - -       4 Methylisothiazolinone - -       5 Carba Mix - -       6 Thiuram Mix [A] - -       7 Bisphenol A Epoxy Resin - -       8 C-Efoq-Tjlzchozbzi-Formaldehyde Resin - -       9 Mercapto Mix [A] - -       10 Black Rubber Mix- PPD [B] - -       11 Potassium Dichromate  -  -       12 Balsam of Peru (Myroxylon Pereirae Resin) - -       13 Nickel Sulphate Hexahydrate - -       14 Mixed Dialkyl Thiourea - -       15 Paraben Mix [B] - -       16 Methyldibromo Glutaronitrile - -       17 Fragrance Mix 8% - -       18 2-Bromo-2-Nitropropane-1,3-Diol (Bronopol) CT - -       19 Lyral - -       20 Tixocortol-21- Pivalate CT - -       21 Diazolidinyl urea (Germall II) - -        22 Methyl Methacrylate - -       23 Cobalt (II) Chloride Hexahydrate - -       24 Fragrance Mix II  - -       25 Compositae Mix II - -       26 Benzoyl Peroxide - -       27 Bacitracin - -       28 Formaldehyde - -       29 Methylchloroisothiazolinone / Methylisothiazolinone - -       30 Corticosteroid Mix CT - -       31 Sodium Lauryl Sulfate - -       32 Lanolin Alcohol - -       33 Turpentine - -       34 Cetylstearylalcohol - -       35 Chlorhexidine Dicluconate - -       36 Budesonide - -       37 Imidazolidinyl Urea  - -       38 Ethyl-2 Cyanoacrylate - -       39 Quaternium 15 (Dowicil 200) - -       40 Decyl Glucoside - -     Compositae Mix II - common yarrow, mountain arnica, Iraqi chamomile, feverfew, and the common tansy   PRESERVATIVES & ANTIMICROBIALS        # Substance 2 days 4 days remarks   41 1 1,2-Benzisothiazoline-3-One, Sodium Salt - -     2 1,3,5-Sanjay (2-Hydroxyethyl) - Hexahydrotriazine (Grotan BK) - -     3 Dichlorophene  - -     4 3, 4, 4' - Triclocarban - -    45 5 4 - Chloro - 3 - Cresol - -     6 4 - Chloro - 4 - Xylenol (PCMX) - -     7 7-Ethylbicyclooxazolidine (Bioban HK3264) - -     8 Benzalkonium Chloride CT - -     9 Benzyl Alcohol - -    50 10 Cetalkonium Chloride - -     11 Cetylpyrimidine Chloride  - -     12 Chloroacetamide - -     13 DMDM Hydantoin - -     14 Glutaraldehyde - -    55 15 Triclosan - -     16 Glyoxal Trimeric Dihydrate - -     17 Iodopropynyl Butylcarbamate - -     18 Octylisothiazoline - -     19 Acetophenone Azine - -    60 20 Bioban P 1487 (Nitrobutyl) Morpholine/(Ethylnitro-Trimethylene) Dimorpholine - -     21 Phenoxyethanol - -     22 Phenyl Salicylate - -     23 Povidone Iodine - -     24 Sodium Benzoate - -    65 25 Sodium Disulfite - -     26 Sorbic Acid - -     27 Thimerosal - -     28 Melamine Formaldehyde Resin - -     29 Ethylenediamine Dihydrochloride - -      Parabens      70 30 Butyl-P-Hydroxybenzoate - -     31 Ethyl-P-Hydroxybenzoate - -     32 Methyl-P-Hydroxybenzoate - -    73 33 Propyl-P-Hydroxybenzoate - -     EMULSIFIERS & ADDITIVES    # Substance 2 days 4 days remarks   74 1 Polyethylene Glycol-400 - -    75 2 Cocamidopropyl Betaine - -     3 Amerchol L101 - -     4 Propylene Glycol - -     5 Triethanolamine - -     6 Sorbitane Sesquiolate CT - -    80 7 Isopropylmyristate - -     8 Polysorbate 80 CT - -     9 Amidoamine   (Stearamidopropyl Dimethylamine) - -     10 Oleamidopropyl Dimethylamine - -     11 Lauryl Glucoside - -    85 12 Coconut Diethanolamide  - -     13 2-Hydroxy-4-Methoxy Benzophenone (Oxybenzone) - -     14 Benzophenone-4 (Sulisobenzon) - -     15 Propolis - -     16 Dexpanthenol - -    90 17 Abitol (Hydroabietyl Alcohol) - -     18 Tert-Butylhydroquinone - -     19 Benzyl Salicylate - -     20 Dimethylaminopropylamin (DMPA) - -     21 Zinc Pyrithione (Zinc Omadine)  - -    95 22 Sanjay(Hydroxymethyl) Nitromethane  - -      Antioxidant       23 Dodecyl Gallate - -      24 Butylhydroxyanisole (BHA) - -     25 Butylhydroxytoluene (BHT) - -     26 Di-Alpha-Tocopherol (Vit E) - -    100 27 Propyl Gallate - -     PERFUMES, FLAVORS & PLANTS        # Substance 2 days 4 days remarks   101 1 Benzyl Cinnamate - -     2 Di-Limonene (Dipentene) - -     3 Cananga Odorata (Scott Chavez) (I) - -     4 Lichen Acid Mix - -    105 5 Mentha Piperita Oil (Peppermint Oil) - -     6 Sesquiterpenelactone mix - -     7 Tea Tree Oil, Oxidized - -     8 Wood Tar Mix - -     9 Abietic Acid - -    110 10 Lavendula Angustifolia Oil (Lavender Oil) - -     11 Fragrance mix II CT * 14% - -      Fragrance Mix I       12 Oakmoss Absolute - -     13 Eugenol - -     14 Geraniol - -    115 15 Hydroxycitronellal - -     16 Isoeugenol - -     17 Cinnamic Aldehyde - -     18 Cinnamic Alcohol  - -      Fragrance mix II       19 Citronellol - -    120 20 Alpha-Hexylcinnamic Aldehyde    - -     21 Citral - -     22 Farnesol - -    123 23 Coumarin - -    Hexylcinnamic aldehyde, Coumarin, Farnesol, Hydroxyisohexy3-cyclohexene carboxaldehyde, citral, citrolellol  CORTICOSTEROIDS   # Substance 2 days 4 days remarks Allergy  class   124 1 Amcinonide - -  B   125 2 Betametasone-17,21 Dipropionate - -  D1    3 Desoximetasone - -  C    4 Betamethasone-17-Valerate - -  D1    5 Hydrocortisone - -  A    6 Clobetasol-17-Propionate - -  D1   130 7 Dexamethasone-21-Phosphate Disodium Salt - -  C    8 Hydrocortisone-17 Butyrate - -  D2    9 Prednisolone - -  A    10 Triamcinolone Acetonide - -  B    11 Methylprednisolone Aceponate - -  D2   135 12 Hydrocortisone-21-Acetate - -  A   136 13 Prednicarbate - -  D2     Group Characteristics of group Generic name Name  cross reactions   A Hydrocortisone   Cloprednole, Fludrocortisone acétate, Hydrocortison acetate, Methylprednisolone, Prednisolone, Tixocortolpivalate Alfacortone, Fucidin H, Dermacalm, Hexacortone, Premandole, Imacort With group D2   B Triamcinolone-acetonide   Budenoside  (R-isomer), Amcinonide, Desonide, Fluocinolone acetonide, Triamcinolone acetonide Locapred, Locatop  Synalar, Pevisone, Kenacort -   C Betamethasone (Without Bernadette)   Betamethasone, Dexamethasone, Flumethasone pivalate, Halomethasone Daivobet, Dexasalyl, Locasalen,   -   D1 Betamethasone-diproprionate   Betamethasone dipropionate, Betamethasone-17-valerate, Clobetasole-propionate, Fluticasone propionate, Mometasone furoate Betnovate, Diprogenta, Diprosalic, Diprosone, Celestoderm, Fucicort,  Cutivate, Axotide, Elocom -   D2 Methylprednisolone-aceponate   Hydrocortisone-aceponate, Hydrocortisone-buteprate, Hydrocortisone-17-butyrate, Methylprednisolone aceponate, Prednicarbate Locoïd, Advantan,  Prednitop With group A and Budesonide (S-isomer)      Results of patch tests:                         Interpretation:  - Negative                    A    = Allergic      (+) Erythema    TI   = Toxic/irritant   + E + Infiltration    RaP = Relevance at Present     ++ E/I + Papulovesicle   Rpr  = Relevance Previously     +++ E/I/P + Blister     nR   = No Relevance     [x] No relevant allergic reaction observed    [] Allergic reaction diagnosed against following allergens:      Interpretation/ remarks:   See later    [] Patient information given   [] ACDS CAMP information's (# ....) to following compounds: .....   [] General information's to following compounds: ......      ____________________________________________    Assessment & Plan:    ==> Final Diagnosis:     # Generalized dry skin with hyperpigmented eczematous lesions on head, extremities, and trunk  DDx: atopic dermatitis and/or allergic contact dermatitis  * chronic illness with exacerbation, progression, side effects from treatment    # No clear signs for atopy  Personal and family history for atopy negative   No inhalant allergies   Prick testing negative   * stable chronic illness    In her labs there were no signs for eosinophilia in peripheral blood and kidney  and liver tests were normal. Her TSH was also normal, so there are no signs for thyroid disease.        These conclusions are made at the best of one's knowledge and belief based on the provided evidence such as patient's history and allergy test results and they can change over time or can be incomplete because of missing information.    ==> Treatment Plan:    >> Will update below treatment plan on Friday after 2nd readings and final conclusions:  >> For atopic dermatitis,   >> Discussed initiation of Adbry  - Start Adbry (Tralokinumab-ldrm) 600 mg (loading dose) and 300 mg every 14 days.  Disp-4 mL, R-11  - Referred to [facility] team for assistance.    >> Patient will return to clinic for patch testing to rule out allergic contact allergy.       Procedures Performed: none    Staff Involved: Provider, Staff, and Scribe    Scribe Disclosure:   I, Ramírez Jung, am serving as a scribe to document services personally performed by Juve Ortega MD based on data collection and the provider's statements to me.     Staff Physician Comments:  I was present with the scribe who participated in the documentation of the note. I have verified the history and personally performed the physical exam and medical decision making. I agree with the assessment and plan as documented in the note. I have reviewed and if necessary amended the note.      Juve Ortega MD  Professor  Head of Dermato-Allergy Division  Department of Dermatology  [facility]       Follow-up in Derm-Allergy clinic for 2nd readings and final conclusions after 4 days   ___________________________    I spent a total of 12 minutes with Robert Anand during today s  visit. This time was spent discussing all the individual test results, correlating them to the clinical relevance, counseling the patient and/or coordinating care

## 2025-05-07 NOTE — LETTER
5/7/2025      Robert Anand  [address] [facility] [id number]      Dear Colleague,    Thank you for referring your patient, Robert Anand, to the [facility] [place]. Please see a copy of my visit note below.    [facility] Dermato-allergology Note  Office visit  Encounter Date: May 7, 2025  ____________________________________________    CC: Allergy Testing Followup (Patch day 3)    HPI:  (May 7, 2025)  Ms. Robert Anand is a(n) 35 year old female who presents today as a return patient for allergy tests as planned  - Follow-up in Derm-Allergy clinic for 1st readings of patch tests after 2 days  - Otherwise feeling well in usual state of health    Physical Exam:  General: In no acute distress, well-developed, well-nourished  Eyes: no conjunctivitis  ENT: no signs of rhinitis   Pulmonary: no wheezing or coughing  Skin: Focused examination of the skin on test sites was performed = see test results below  No active eczematous skin lesions on tests sites, particularly back    Earlier History and Allergy Exams:  (May 5, 2025)  Presents today as a return patient for allergy tests as planned  - Follow-up in Derm-Allergy clinic for patch testing as planned     (Apr 30, 2025)  Presents today as new patient for allergy consultation  - Referred by Dr. Carolyn Gray on 2/15/24 for dermatitis.   - 9/14/2023 OV visit with Dr. Carolyn Gray (Derm)  FROM HPI:  Ms. Robert Anand is a(n) 33 year old female who presents today as a return.   She is 6 months pregnant. She stopped all topicals. Is itchy and dark again.     FROM A&P:  # Dermatitis, neck hands, itchy. Pregnant she reports for 6 months  Stop elidel(pimecrolimus and tretinoin if using)     Switch to hydrocortisone 2.5% cream : Apply twice daily for 1 week, then 3 times weekly for 1 week, then repeat, reviewed risks with pregnancy  -reviewed skin thinning     For the body, start vanicream twice daily from the neck  down.  -referral Dr. Ortega  -Future-dupixent and return to tacrolimus inhibitor. COnsider new topical eucrisa also     # Acne vulgaris.   #melasma  -pt stopped tretinoin  -sunscreen    - 2/15/2024 VV visit with Dr. Carolyn Gray (Derm)  FROM A&P:  # Eczematous dermatitis, neck, arms, face with PIH. Patient is breastfeeding. I have never seen in person but  has. - last visit topicals adjusted. Dupixent was considered . Dr. Ortega referral for patch testing  - Lidex for up to 2 weeks per month on trunk and extemities  - ELIDEL BID for face   - Future consideration: Dupixent      # Acne vulgaris  -hold that   # Melasma.  -hold     Skin: Focused examination of the skin on test sites was performed = see test results below  Waist-up skin, which includes the head/face, neck, both arms, chest, back, abdomen, digits and/or nails was examined.  - Generally very dry scaly skin with hyperpigmentation  - Some specials lesion on the neck and fontal hairline, little bit hyperkeratosis     Past Medical History:   Patient Active Problem List   Diagnosis     Normal pregnancy in third trimester     GBS (group B Streptococcus carrier), +RV culture, currently pregnant     Term pregnancy     Gestational hypertension     No past medical history on file.    Allergies:  No Known Allergies    Medications:  Current Outpatient Medications   Medication Sig Dispense Refill     cyclobenzaprine (FLEXERIL) 5 MG tablet Take 1 tablet (5 mg) by mouth 3 times daily as needed for muscle spasms. 60 tablet 1     ibuprofen (ADVIL/MOTRIN) 600 MG tablet Take 1 tablet (600 mg) by mouth every 6 hours as needed for moderate pain. 60 tablet 2     Tralokinumab-ldrm (ADBRY) 300 MG/2ML SOAJ Inject 600 mg subcutaneously See Admin Instructions for 1 dose. followed by 300 mg every other week. 6 mL 0     Tralokinumab-ldrm (ADBRY) 300 MG/2ML SOAJ Inject 300 mg subcutaneously every 14 days. 4 mL 11     triamcinolone (KENALOG) 0.1 % external cream Apply  topically 2 times daily. (Not more than 2 weeks) 30 g 0     Current Facility-Administered Medications   Medication Dose Route Frequency Provider Last Rate Last Admin     medroxyPROGESTERone (DEPO-PROVERA) injection 150 mg  150 mg Intramuscular Q90 Days            Previous Labs, Allergy Tests, Dermatopathology, Imaging:  N/a    Referred By: Carolyn Gray MD  [address]  [place] [address]     Allergy Tests:  Past Allergy Test    Family History:  No family history on file.    Social History:  The patient works part-time at [employer].   Patient has the following hobbies or non-occupational exposure: n/a.    Order for Future Allergy Testing:    [x] Outpatient  [] Inpatient: [facility]..../ Bed ...    Skin Atopy (atopic dermatitis)? [x] Yes     [] No  Comments:   - sent by Dr. Gray for recurrent eczema   - has constant itching   - triamcinolone did not really help, caused more itching     Childhood eczema?   [] Yes     [x] No  Comments:   Hand eczema?   [] Yes     [x] No  If yes, leading hand? [] Right     [] Left     [] Ambidextrous  Comments:     Contact allergies?     Comments:   Including adhesives/bandages? [] Yes     [x] No  Comments:   Including metals?   [] Yes     [x] No  Comments:   - does not really wear jewelry  Other substances?   [] Yes     [x] No  Comments:     Drug allergies?   [] Yes     [x] No  Comments:   - stopped flexeril as it induced more itching   - no other medications     Angioedema?   [] Yes     [x] No  Comments:     Urticaria?   [] Yes     [x] No  Comments:     Food allergies?   [] Yes     [x] No  Comments:     Pet allergies?   [] Yes     [x] No  Comments:     Environmental allergy symptoms?  [] Conjunctivitis  [] Otitis  [] Pharyngitis  [] Polyposis  [] Postnasal Drip  [] Rhinitis  [] Sinusitis  [x] None  Comments:     HENT Operations?  [] Adenoids [] Septum [] Sinus  [] Tonsils        [] Other:   [x] None  Comments:     Pulmonary symptoms (from birth to  present)?  [] Asthma bronchiale  Inhaler(s)?:   [] Coughing  [] Other  [x] None  Comments:     Environmental and pulmonary symptoms aggravated by?  Season: [x] None     [] I     [] II     [] III     [] IV     [] V     [] VI          [] VII     [] VIII     [] IX     [] X     [] XI     [] XII     [] Perennial  Time of Day: [x] None     [] Morning     [] Noon     [] Evening     [] Night     [] Whole Day  Location/Changes: [x] None     [] Inside     [] Outside     [] Mountain     [] Sea     [] Other:   Triggers (specific): [x] None     [] Animals     [] Dust     [] Mold     [] Plants     [] Other:   Triggers (other): [x] None     [] Psyche     [] Sport     [] Work     [] Other:   Irritant: [x] None     [] Cold     [] Heat     [] Odors     [] Physical Efforts     [] Smoke     [] Other:     Order for PATCH TESTS  Reason for tests (suspected allergy): possible contact allergy  Known previous allergies: none confirmed   Standardized panels  [x] Standard panel (40 tests)  [x] Preservatives & Antimicrobials (31 tests)  [x] Emulsifiers & Additives (25 tests)   [x] Perfumes/Flavours & Plants (25 tests)  [] Hairdresser panel (12 tests)  [] Rubber Chemicals (22 tests)  [] Plastics (26 tests)  [] Colorants/Dyes/Food additives (20 tests)  [] Metals (implants/dental) (24 tests)  [] Local anaesthetics/NSAIDs (13 tests)  [] Antibiotics & Antimycotics (14 tests)   [x] Corticosteroids (15 tests)   [] Photopatch test (62 tests)   [] Others:     [] Patient's Own Products:   DO NOT test if chemical or biological identity is unknown!   always ask from patient the product information and safety sheets (MSDS)       Order for PRICK TESTS    Reason for tests (suspected allergy): atopy?  Known previous allergies: none confirmed     Standardized prick panels  [x] Atopic panel (20 tests)  [] Pediatric Panel (12 tests)  [] Milk, Meat, Eggs, Grains (20 tests)   [] Dust, Epithelia, Feathers (10 tests)  [] Fish, Seafood, Shellfish (17 tests)  [] Nuts,  Beans (14 tests)  [] Spice, Vegetable, Fruit (17 tests)  [] Pollen Panel = Tree, Grass, Weed (24 tests)  [] Others:     [] Patient's Own Products:   DO NOT test if chemical or biological identity is unknown!   always ask from patient the product information and safety sheets (MSDS)     Standardized intradermal tests  [] Alternaria alternata  [] Aspergillus fumigatus  [] Cladosporium herbarum   [] Penicillium notatum  [] Dermatophagoides farinae  [] Dermatophagoides pteronyssinus  [] Dog Epithelium  [] Cat Epithelium  [] Others:     [] Bee venom   [] Wasp venom  !!Specific protocol with dilutions!!       Order for Drug allergy tests (prick & intradermal & patch tests)    [] Penicillin G     [] Ampicillin   [] Cefazolin (1st gen)     [] Cefuroxime (2nd gen)     [] Ceftriaxone (3rd gen)     [] Ceftazidime (3rd gen)     [] Cefepime (4th gen)       [] Bactrim  [] Iodixanol (Visipaque)     [] Iopamidol (Isovue)       [] Iohexol (Omnipaque)  [] Others:   [] Patient's Own:   Order for .... as test date        Atopy Screen (Placed Apr 30, 2025)  No Substance Readings (15 min) Evaluation   POS Histamine 1mg/ml ++    NEG NaCl 0.9% -      No Substance Readings (15 min) Evaluation   1 Alternaria alternata (tenuis)  -    2 Cladosporium herbarum -    3 Aspergillus fumigatus -    4 Penicillium notatum -    5 Dermatophagoides pteronyssinus -    6 Dermatophagoides farinae -    7 Dog epithelium (canis spp) -    8 Cat hair (karla catus) -    9 Cockroach   (Blatella americana & germanica) -    10 Grass mix midwest   (Lora, Orchard, Redtop, Silvino) -    11 Michael grass (sorghum halepense) -    12 Weed mix   (common Cocklebur, Lamb s quarters, rough redroot Pigweed, Dock/Sorrel) -    13 Mug wort (artemisia vulgare) -    14 Ragweed giant/short (ambrosia spp) -    15 White birch (Betula papyrifera) -    16 Tree mix 1 (Pecan, Maple BHR, Oak RVW, american Craig, black [place]) -    17 Red cedar (juniperus virginia) -    18 Tree mix 2    (white Cam, river/red Birch, black Manchester, common Irwin, american Elm) -    19 Box elder/Maple mix (acer spp) -    20 Bosque shagbark (carya ovata) -           Conclusion  ________________________________      RESULTS & EVALUATION of PATCH TESTS    May 5, 2025 application of patch tests:    Patch test readings after     [x] 2 days, [] 3 days [x] 4 days, [] 5 days,  Other duration: ...    STANDARD Series                                          # Substance 2 days 4 days remarks     1 Arnel Mix III 10% - -       2 Colophony - -       3  2-Mercaptobenzothiazole  - -       4 Methylisothiazolinone - -       5 Carba Mix - -       6 Thiuram Mix [A] - -       7 Bisphenol A Epoxy Resin - -       8 C-Qnju-Aqiaaowzfte-Formaldehyde Resin - -       9 Mercapto Mix [A] - -       10 Black Rubber Mix- PPD [B] - -       11 Potassium Dichromate  -  -       12 Balsam of [place] (Myroxylon Pereirae Resin) - -       13 Nickel Sulphate Hexahydrate - -       14 Mixed Dialkyl Thiourea - -       15 Paraben Mix [B] - -       16 Methyldibromo Glutaronitrile - -       17 Fragrance Mix 8% - -       18 2-Bromo-2-Nitropropane-1,3-Diol (Bronopol) CT - -       19 Lyral - -       20 Tixocortol-21- Pivalate CT - -       21 Diazolidinyl urea (Germall II) - -        22 Methyl Methacrylate - -       23 Cobalt (II) Chloride Hexahydrate - -       24 Fragrance Mix II  - -       25 Compositae Mix II - -       26 Benzoyl Peroxide - -       27 Bacitracin - -       28 Formaldehyde - -       29 Methylchloroisothiazolinone / Methylisothiazolinone - -       30 Corticosteroid Mix CT - -       31 Sodium Lauryl Sulfate - -       32 Lanolin Alcohol - -       33 Turpentine - -       34 Cetylstearylalcohol - -       35 Chlorhexidine Dicluconate - -       36 Budesonide - -       37 Imidazolidinyl Urea  - -       38 Ethyl-2 Cyanoacrylate - -       39 Quaternium 15 (Dowicil 200) - -       40 Decyl Glucoside - -     Compositae Mix II - common yarrow, mountain arnica,  Romansh chamomile, feverfew, and the common tansy   PRESERVATIVES & ANTIMICROBIALS        # Substance 2 days 4 days remarks   41 1 1,2-Benzisothiazoline-3-One, Sodium Salt - -     2 1,3,5-Sanjay (2-Hydroxyethyl) - Hexahydrotriazine (Grotan BK) - -     3 Dichlorophene - -     4 3, 4, 4' - Triclocarban - -    45 5 4 - Chloro - 3 - Cresol - -     6 4 - Chloro - 4 - Xylenol (PCMX) - -     7 7-Ethylbicyclooxazolidine (Bioban ON2152) - -     8 Benzalkonium Chloride CT - -     9 Benzyl Alcohol - -    50 10 Cetalkonium Chloride - -     11 Cetylpyrimidine Chloride  - -     12 Chloroacetamide - -     13 DMDM Hydantoin - -     14 Glutaraldehyde - -    55 15 Triclosan - -     16 Glyoxal Trimeric Dihydrate - -     17 Iodopropynyl Butylcarbamate - -     18 Octylisothiazoline - -     19 Acetophenone Azine - -    60 20 Bioban P 1487 (Nitrobutyl) Morpholine/(Ethylnitro-Trimethylene) Dimorpholine - -     21 Phenoxyethanol - -     22 Phenyl Salicylate - -     23 Povidone Iodine - -     24 Sodium Benzoate - -    65 25 Sodium Disulfite - -     26 Sorbic Acid - -     27 Thimerosal - -     28 Melamine Formaldehyde Resin - -     29 Ethylenediamine Dihydrochloride - -      Parabens      70 30 Butyl-P-Hydroxybenzoate - -     31 Ethyl-P-Hydroxybenzoate - -     32 Methyl-P-Hydroxybenzoate - -    73 33 Propyl-P-Hydroxybenzoate - -     EMULSIFIERS & ADDITIVES    # Substance 2 days 4 days remarks   74 1 Polyethylene Glycol-400 - -    75 2 Cocamidopropyl Betaine - -     3 Amerchol L101 - -     4 Propylene Glycol - -     5 Triethanolamine - -     6 Sorbitane Sesquiolate CT - -    80 7 Isopropylmyristate - -     8 Polysorbate 80 CT - -     9 Amidoamine   (Stearamidopropyl Dimethylamine) - -     10 Oleamidopropyl Dimethylamine - -     11 Lauryl Glucoside - -    85 12 Coconut Diethanolamide  - -     13 2-Hydroxy-4-Methoxy Benzophenone (Oxybenzone) - -     14 Benzophenone-4 (Sulisobenzon) - -     15 Propolis - -     16 Dexpanthenol - -    90 17 Abitol  (Hydroabietyl Alcohol) - -     18 Tert-Butylhydroquinone - -     19 Benzyl Salicylate - -     20 Dimethylaminopropylamin (DMPA) - -     21 Zinc Pyrithione (Zinc Omadine)  - -    95 22 Sanjay(Hydroxymethyl) Nitromethane  - -      Antioxidant       23 Dodecyl Gallate - -     24 Butylhydroxyanisole (BHA) - -     25 Butylhydroxytoluene (BHT) - -     26 Di-Alpha-Tocopherol (Vit E) - -    100 27 Propyl Gallate - -     PERFUMES, FLAVORS & PLANTS        # Substance 2 days 4 days remarks   101 1 Benzyl Cinnamate - -     2 Di-Limonene (Dipentene) - -     3 Cananga Odorata (Scott Chavez) (I) - -     4 Lichen Acid Mix - -    105 5 Mentha Piperita Oil (Peppermint Oil) - -     6 Sesquiterpenelactone mix - -     7 Tea Tree Oil, Oxidized - -     8 Wood Tar Mix - -     9 Abietic Acid - -    110 10 Lavendula Angustifolia Oil (Lavender Oil) - -     11 Fragrance mix II CT * 14% - -      Fragrance Mix I       12 Oakmoss Absolute - -     13 Eugenol - -     14 Geraniol - -    115 15 Hydroxycitronellal - -     16 Isoeugenol - -     17 Cinnamic Aldehyde - -     18 Cinnamic Alcohol  - -      Fragrance mix II       19 Citronellol - -    120 20 Alpha-Hexylcinnamic Aldehyde    - -     21 Citral - -     22 Farnesol - -    123 23 Coumarin - -    Hexylcinnamic aldehyde, Coumarin, Farnesol, Hydroxyisohexy3-cyclohexene carboxaldehyde, citral, citrolellol  CORTICOSTEROIDS   # Substance 2 days 4 days remarks Allergy  class   124 1 Amcinonide - -  B   125 2 Betametasone-17,21 Dipropionate - -  D1    3 Desoximetasone - -  C    4 Betamethasone-17-Valerate - -  D1    5 Hydrocortisone - -  A    6 Clobetasol-17-Propionate - -  D1   130 7 Dexamethasone-21-Phosphate Disodium Salt - -  C    8 Hydrocortisone-17 Butyrate - -  D2    9 Prednisolone - -  A    10 Triamcinolone Acetonide - -  B    11 Methylprednisolone Aceponate - -  D2   135 12 Hydrocortisone-21-Acetate - -  A   136 13 Prednicarbate - -  D2     Group Characteristics of group Generic name Name  cross  reactions   A Hydrocortisone   Cloprednole, Fludrocortisone acétate, Hydrocortison acetate, Methylprednisolone, Prednisolone, Tixocortolpivalate Alfacortone, Fucidin H, Dermacalm, Hexacortone, Premandole, Imacort With group D2   B Triamcinolone-acetonide   Budenoside (R-isomer), Amcinonide, Desonide, Fluocinolone acetonide, Triamcinolone acetonide Locapred, Locatop  Synalar, Pevisone, Kenacort -   C Betamethasone (Without Bernadette)   Betamethasone, Dexamethasone, Flumethasone pivalate, Halomethasone Daivobet, Dexasalyl, Locasalen,   -   D1 Betamethasone-diproprionate   Betamethasone dipropionate, Betamethasone-17-valerate, Clobetasole-propionate, Fluticasone propionate, Mometasone furoate Betnovate, Diprogenta, Diprosalic, Diprosone, Celestoderm, Fucicort,  Cutivate, Axotide, Elocom -   D2 Methylprednisolone-aceponate   Hydrocortisone-aceponate, Hydrocortisone-buteprate, Hydrocortisone-17-butyrate, Methylprednisolone aceponate, Prednicarbate Locoïd, Advantan,  Prednitop With group A and Budesonide (S-isomer)      Results of patch tests:                         Interpretation:  - Negative                    A    = Allergic      (+) Erythema    TI   = Toxic/irritant   + E + Infiltration    RaP = Relevance at Present     ++ E/I + Papulovesicle   Rpr  = Relevance Previously     +++ E/I/P + Blister     nR   = No Relevance     [x] No relevant allergic reaction observed    [] Allergic reaction diagnosed against following allergens:      Interpretation/ remarks:   See later    [] Patient information given   [] ACDS CAMP information's (# ....) to following compounds: .....   [] General information's to following compounds: ......      ____________________________________________    Assessment & Plan:    ==> Final Diagnosis:     # Generalized dry skin with hyperpigmented eczematous lesions on head, extremities, and trunk  DDx: atopic dermatitis and/or allergic contact dermatitis  * chronic illness with exacerbation, progression,  side effects from treatment    # No clear signs for atopy  Personal and family history for atopy negative   No inhalant allergies   Prick testing negative   * stable chronic illness    In her labs there were no signs for eosinophilia in peripheral blood and kidney and liver tests were normal. Her TSH was also normal, so there are no signs for thyroid disease.        These conclusions are made at the best of one's knowledge and belief based on the provided evidence such as patient's history and allergy test results and they can change over time or can be incomplete because of missing information.    ==> Treatment Plan:    >> Will update below treatment plan on Friday after 2nd readings and final conclusions:  >> For atopic dermatitis,   >> Discussed initiation of Adbry  - Start Adbry (Tralokinumab-ldrm) 600 mg (loading dose) and 300 mg every 14 days.  Disp-4 mL, R-11  - Referred to MTM team for assistance.    >> Patient will return to clinic for patch testing to rule out allergic contact allergy.       Procedures Performed: none    Staff Involved: Provider, Staff, and Scribe    Scribe Disclosure:   I, Ramírez Jung, am serving as a scribe to document services personally performed by Juve Ortega MD based on data collection and the provider's statements to me.     Staff Physician Comments:  I was present with the scribe who participated in the documentation of the note. I have verified the history and personally performed the physical exam and medical decision making. I agree with the assessment and plan as documented in the note. I have reviewed and if necessary amended the note.      Juve Ortega MD  Professor  Head of Dermato-Allergy Division  Department of Dermatology  [facility]       Follow-up in Derm-Allergy clinic for 2nd readings and final conclusions after 4 days   ___________________________    I spent a total of 12 minutes with Robert Anand during today s   visit. This time was spent discussing all the individual test results, correlating them to the clinical relevance, counseling the patient and/or coordinating care       Again, thank you for allowing me to participate in the care of your patient.        Sincerely,        Juve Ortega MD    Electronically signed

## 2025-05-09 ENCOUNTER — OFFICE VISIT (OUTPATIENT)
Dept: ALLERGY | Facility: CLINIC | Age: 35
End: 2025-05-09

## 2025-05-09 DIAGNOSIS — L20.89 OTHER ATOPIC DERMATITIS: Primary | ICD-10-CM

## 2025-05-09 DIAGNOSIS — L81.0 HYPERPIGMENTATION OF SKIN, POSTINFLAMMATORY: ICD-10-CM

## 2025-05-09 DIAGNOSIS — L85.3 XEROSIS CUTIS: ICD-10-CM

## 2025-05-09 DIAGNOSIS — L23.0 ALLERGIC CONTACT DERMATITIS DUE TO METALS: ICD-10-CM

## 2025-05-09 PROCEDURE — 99214 OFFICE O/P EST MOD 30 MIN: CPT | Performed by: DERMATOLOGY

## 2025-05-09 NOTE — PATIENT INSTRUCTIONS
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    Nickel is found in inexpensive costume jewelry, earrings, watches and watchbands, buttons on jeans, needles, zippers, lighters, batteries, coins, keys, alesha, eyeglasses frames, orthodontic, and orthopedic devices. Nickel may also be found in metal equipment, furniture, tools, washing machines, razors, scissors, paper clips, cooking utensils and appliances, silverware, and handles. Dietary exposure to nickel can provoke dermatitis in sensitized individuals and foods reported to be high in nickel include legumes, nuts, whole grain flour, oats, soybeans, shellfish, fish, licorice, chocolate, and potatoes. Additionally, herbal remedies, herbal teas, and some OTC multi-vitamins contain high levels of nickel and have the potential to provoke a reaction when ingested in sufficient quantities. Exposure to some medical devices such as nickel-containing infusion cannulas, mitral-valve prostheses, and intravenous catheters have also been reported to cause nickel-related reactions. Nickel-containing dental restorations and appliances have also been reported to cause elicitation of an allergic response in sensitized individuals.    How can you avoid contact with this allergen?    Avoid products that list any of the following names in the ingredients:    - Nickel sulfate (NiSO4)  - nickel (Ni)  - nickel alloys  - elemental nickel  - nickel soluble salts  - carbonyl nickel powder  - nickel-plating  - nickel catalyst  What are some products that may contain this allergen?    Belt Alesha  Clothing Fasteners  - Alesha  - Buttons  - York  - Rivets  - Pins  - Snaps  - Zippers  Complementary and Alternative Remedies (CAR)  - Asthma, acne, atopic eczema, seborrhea, and psoriasis treatments  - Herbal remedies  - Herbal teas  -  Multi-vitamins  Construction Materials  - Alnox (Standard) Electrical Joint Compound  - Quikcrete  Color-Mauricio (colorant for cement and concrete)  Foods (canned foods may be higher)  - Chocolate  - Fish  - Grains  - Legumes  - Nuts  - Potatoes  Jewelry  - Earrings  - Hair ornaments  - Metal costume jewelry  - Some jewelry with white gold, 14-carat yellow gold, chrome, bronze, or brass  Household Items  - Appliances  - Batteries  - Coins ([place] nickels, 1-Euro and 2-Euro)  - Cooking utensils  - Eyeglasses frames  - Handles  - Keys  - Lighters  - Needles  - Paper clips  - Razors  - Scissors  - Silverware  - Washing machines  Medical devices  - Intravenous catheters  - Joint replacements  - Nickel-containing infusion cannulas  - Nickel-containing mitral-valve prostheses  Metal equipment, furniture, and tools  Orthodontic and Orthopedic Devices  - Orthodontic appliances  Watches and Watchbands  Notes:    The most common cause of nickel sensitization in women is ear piercing and direct contact with nickel-containing jewelry. Both the piercing procedure and subsequent jewelry use may contribute to sensitization. Despite attempts to prohibit the sale of nickel-containing jewelry, eyeglass frames or clothing accessories in some countries, nickel allergy is increasing in both men and women (reportedly doubling every ten years). Approximately 24% of [place] adults ages 15-50 have one or more body piercings. This factor coupled with ubiquitous exposure to nickel indicates that nickel allergy will continue to be a significant source of allergic contact dermatitis.    Perspiration is a factor in nickel dermatitis and patients often report that their dermatitis is worse in hotter times of the year. A nickel object worn against perspiring skin may cause an itchy or prickling sensation within 15-20 minutes with an eruption appearing in 45 minutes to an hour. This same person may be able to wear the same object for hours with no reaction  in cooler temperatures when the skin is cool and not perspiring. Other factors such as friction and pressure combined with the presence of sweat determine whether a nickel-plated object will produce dermatitis in a sensitized individual.    If you have a contact allergy to nickel, it's important to avoid products that contain this metal. Nickel detection dimethylglyoxime products such as Allertest Ni provide a safe and effective way to test objects for the presence of nickel.

## 2025-05-09 NOTE — NURSING NOTE
Chief Complaint   Patient presents with    Allergy Testing Followup     Patch day 5     Janine Bustamante RN

## 2025-05-09 NOTE — LETTER
5/9/2025      Robert Anand  [address] [address]      Dear Colleague,    Thank you for referring your patient, Robert Anand, to the [facility] [place]. Please see a copy of my visit note below.    [facility] D[facility] Note  Office visit  Encounter Date: May 9, 2025  ____________________________________________    CC: Allergy Testing Followup (Patch day 5)    HPI:  (May 9, 2025)  Ms. Robert Anand is a(n) 35 year old female who presents today as a return patient for allergy tests as planned  - Follow-up in Derm-Allergy clinic for 2nd readings and final conclusions after 4 days   - Otherwise feeling well in usual state of health    Physical Exam:  General: In no acute distress, well-developed, well-nourished  Eyes: no conjunctivitis  ENT: no signs of rhinitis   Pulmonary: no wheezing or coughing  Skin: Focused examination of the skin on test sites was performed = see test results below    Earlier History and Allergy Exams:  (May 5, 2025)   Presents today as a return patient for allergy tests as planned  - Follow-up in Derm-Allergy clinic for patch testing as planned   - Otherwise feeling well in usual state of health  (Apr 30, 2025)  Presents today as new patient for allergy consultation  - Referred by Dr. Carolyn Gray on 2/15/24 for dermatitis.   - 9/14/2023 OV visit with Dr. Carolyn Gray (Derm)  FROM HPI:  Ms. Robert Anand is a(n) 33 year old female who presents today as a return.   She is 6 months pregnant. She stopped all topicals. Is itchy and dark again.     FROM A&P:  # Dermatitis, neck hands, itchy. Pregnant she reports for 6 months  Stop elidel(pimecrolimus and tretinoin if using)     Switch to hydrocortisone 2.5% cream : Apply twice daily for 1 week, then 3 times weekly for 1 week, then repeat, reviewed risks with pregnancy  -reviewed skin thinning     For the body, start vanicream twice daily from the neck  down.  -referral Dr. Ortega  -Future-dupixent and return to tacrolimus inhibitor. COnsider new topical eucrisa also     # Acne vulgaris.   #melasma  -pt stopped tretinoin  -sunscreen    - 2/15/2024 VV visit with Dr. Carolyn Gray (Derm)  FROM A&P:  # Eczematous dermatitis, neck, arms, face with PIH. Patient is breastfeeding. I have never seen in person but  has. - last visit topicals adjusted. Dupixent was considered . Dr. Ortega referral for patch testing  - Lidex for up to 2 weeks per month on trunk and extemities  - ELIDEL BID for face   - Future consideration: Dupixent      # Acne vulgaris  -hold that   # Melasma.  -hold     Skin: Focused examination of the skin on test sites was performed = see test results below  Waist-up skin, which includes the head/face, neck, both arms, chest, back, abdomen, digits and/or nails was examined.  - Generally very dry scaly skin with hyperpigmentation  - Some specials lesion on the neck and fontal hairline, little bit hyperkeratosis     Past Medical History:   Patient Active Problem List   Diagnosis     Normal pregnancy in third trimester     GBS (group B Streptococcus carrier), +RV culture, currently pregnant     Term pregnancy     Gestational hypertension     No past medical history on file.    Allergies:  No Known Allergies    Medications:  Current Outpatient Medications   Medication Sig Dispense Refill     cyclobenzaprine (FLEXERIL) 5 MG tablet Take 1 tablet (5 mg) by mouth 3 times daily as needed for muscle spasms. 60 tablet 1     ibuprofen (ADVIL/MOTRIN) 600 MG tablet Take 1 tablet (600 mg) by mouth every 6 hours as needed for moderate pain. 60 tablet 2     Tralokinumab-ldrm (ADBRY) 300 MG/2ML SOAJ Inject 600 mg subcutaneously See Admin Instructions for 1 dose. followed by 300 mg every other week. 6 mL 0     Tralokinumab-ldrm (ADBRY) 300 MG/2ML SOAJ Inject 300 mg subcutaneously every 14 days. 4 mL 11     triamcinolone (KENALOG) 0.1 % external cream Apply  topically 2 times daily. (Not more than 2 weeks) 30 g 0     Current Facility-Administered Medications   Medication Dose Route Frequency Provider Last Rate Last Admin     medroxyPROGESTERone (DEPO-PROVERA) injection 150 mg  150 mg Intramuscular Q90 Days            Previous Labs, Allergy Tests, Dermatopathology, Imaging:  N/a    Referred By: Carloyn Gray MD  [address] [facility] [address]  [place] [address]     Allergy Tests:  Past Allergy Test    Family History:  No family history on file.    Social History:  The patient works part-time at [employer].   Patient has the following hobbies or non-occupational exposure: n/a.    Order for Future Allergy Testing:    [x] Outpatient  [] Inpatient: [facility]..../ Bed ...    Skin Atopy (atopic dermatitis)? [x] Yes     [] No  Comments:   - sent by Dr. Gray for recurrent eczema   - has constant itching   - triamcinolone did not really help, caused more itching     Childhood eczema?   [] Yes     [x] No  Comments:   Hand eczema?   [] Yes     [x] No  If yes, leading hand? [] Right     [] Left     [] Ambidextrous  Comments:     Contact allergies?     Comments:   Including adhesives/bandages? [] Yes     [x] No  Comments:   Including metals?   [] Yes     [x] No  Comments:   - does not really wear jewelry  Other substances?   [] Yes     [x] No  Comments:     Drug allergies?   [] Yes     [x] No  Comments:   - stopped flexeril as it induced more itching   - no other medications     Angioedema?   [] Yes     [x] No  Comments:     Urticaria?   [] Yes     [x] No  Comments:     Food allergies?   [] Yes     [x] No  Comments:     Pet allergies?   [] Yes     [x] No  Comments:     Environmental allergy symptoms?  [] Conjunctivitis  [] Otitis  [] Pharyngitis  [] Polyposis  [] Postnasal Drip  [] Rhinitis  [] Sinusitis  [x] None  Comments:     HENT Operations?  [] Adenoids [] Septum [] Sinus  [] Tonsils        [] Other:   [x] None  Comments:     Pulmonary symptoms (from birth to  present)?  [] Asthma bronchiale  Inhaler(s)?:   [] Coughing  [] Other  [x] None  Comments:     Environmental and pulmonary symptoms aggravated by?  Season: [x] None     [] I     [] II     [] III     [] IV     [] V     [] VI          [] VII     [] VIII     [] IX     [] X     [] XI     [] XII     [] Perennial  Time of Day: [x] None     [] Morning     [] Noon     [] Evening     [] Night     [] Whole Day  Location/Changes: [x] None     [] Inside     [] Outside     [] Mountain     [] Sea     [] Other:   Triggers (specific): [x] None     [] Animals     [] Dust     [] Mold     [] Plants     [] Other:   Triggers (other): [x] None     [] Psyche     [] Sport     [] Work     [] Other:   Irritant: [x] None     [] Cold     [] Heat     [] Odors     [] Physical Efforts     [] Smoke     [] Other:     Order for PATCH TESTS  Reason for tests (suspected allergy): possible contact allergy  Known previous allergies: none confirmed   Standardized panels  [x] Standard panel (40 tests)  [x] Preservatives & Antimicrobials (31 tests)  [x] Emulsifiers & Additives (25 tests)   [x] Perfumes/Flavours & Plants (25 tests)  [] Hairdresser panel (12 tests)  [] Rubber Chemicals (22 tests)  [] Plastics (26 tests)  [] Colorants/Dyes/Food additives (20 tests)  [] Metals (implants/dental) (24 tests)  [] Local anaesthetics/NSAIDs (13 tests)  [] Antibiotics & Antimycotics (14 tests)   [x] Corticosteroids (15 tests)   [] Photopatch test (62 tests)   [] Others:     [] Patient's Own Products:   DO NOT test if chemical or biological identity is unknown!   always ask from patient the product information and safety sheets (MSDS)       Order for PRICK TESTS    Reason for tests (suspected allergy): atopy?  Known previous allergies: none confirmed     Standardized prick panels  [x] Atopic panel (20 tests)  [] Pediatric Panel (12 tests)  [] Milk, Meat, Eggs, Grains (20 tests)   [] Dust, Epithelia, Feathers (10 tests)  [] Fish, Seafood, Shellfish (17 tests)  [] Nuts,  Beans (14 tests)  [] Spice, Vegetable, Fruit (17 tests)  [] Pollen Panel = Tree, Grass, Weed (24 tests)  [] Others:     [] Patient's Own Products:   DO NOT test if chemical or biological identity is unknown!   always ask from patient the product information and safety sheets (MSDS)     Standardized intradermal tests  [] Alternaria alternata  [] Aspergillus fumigatus  [] Cladosporium herbarum   [] Penicillium notatum  [] Dermatophagoides farinae  [] Dermatophagoides pteronyssinus  [] Dog Epithelium  [] Cat Epithelium  [] Others:     [] Bee venom   [] Wasp venom  !!Specific protocol with dilutions!!       Order for Drug allergy tests (prick & intradermal & patch tests)    [] Penicillin G     [] Ampicillin   [] Cefazolin (1st gen)     [] Cefuroxime (2nd gen)     [] Ceftriaxone (3rd gen)     [] Ceftazidime (3rd gen)     [] Cefepime (4th gen)       [] Bactrim  [] Iodixanol (Visipaque)     [] Iopamidol (Isovue)       [] Iohexol (Omnipaque)  [] Others:   [] Patient's Own:   Order for .... as test date        Atopy Screen (Placed Apr 30, 2025)  No Substance Readings (15 min) Evaluation   POS Histamine 1mg/ml ++    NEG NaCl 0.9% -      No Substance Readings (15 min) Evaluation   1 Alternaria alternata (tenuis)  -    2 Cladosporium herbarum -    3 Aspergillus fumigatus -    4 Penicillium notatum -    5 Dermatophagoides pteronyssinus -    6 Dermatophagoides farinae -    7 Dog epithelium (canis spp) -    8 Cat hair (karla catus) -    9 Cockroach   (Blatella americana & germanica) -    10 Grass mix midwest   (Lora, Orchard, Redtop, Silvino) -    11 Michael grass (sorghum halepense) -    12 Weed mix   (common Cocklebur, Lamb s quarters, rough redroot Pigweed, Dock/Sorrel) -    13 Mug wort (artemisia vulgare) -    14 Ragweed giant/short (ambrosia spp) -    15 White birch (Betula papyrifera) -    16 Tree mix 1 (Pecan, Maple BHR, Oak RVW, american [place], black [place]) -    17 Red cedar (juniperus virginia) -    18 Tree mix 2    (white Cam, river/red Birch, black [place], common [place], american Elm) -    19 Box elder/Maple mix (acer spp) -    20 [place] shagbark (carya ovata) -           Conclusion  ________________________________      RESULTS & EVALUATION of PATCH TESTS    May 5, 2025 application of patch tests:    Patch test readings after     [x] 2 days, [] 3 days [x] 4 days, [] 5 days,  Other duration: ...    STANDARD Series                                          # Substance 2 days 4 days remarks     1 Arnel Mix III 10% - -       2 Colophony - -       3  2-Mercaptobenzothiazole  - -       4 Methylisothiazolinone - -       5 Carba Mix - -       6 Thiuram Mix [A] - -       7 Bisphenol A Epoxy Resin - -       8 E-Usnj-Weucqgudqao-Formaldehyde Resin - -       9 Mercapto Mix [A] - -       10 Black Rubber Mix- PPD [B] - -       11 Potassium Dichromate  -  -       12 Balsam of [place] (Myroxylon Pereirae Resin) - -       13 Nickel Sulphate Hexahydrate - +/++       14 Mixed Dialkyl Thiourea - -       15 Paraben Mix [B] - -       16 Methyldibromo Glutaronitrile - -       17 Fragrance Mix 8% - -       18 2-Bromo-2-Nitropropane-1,3-Diol (Bronopol) CT - -       19 Lyral - -       20 Tixocortol-21- Pivalate CT - -       21 Diazolidinyl urea (Germall II) - -        22 Methyl Methacrylate - -       23 Cobalt (II) Chloride Hexahydrate - -       24 Fragrance Mix II  - -       25 Compositae Mix II - -       26 Benzoyl Peroxide - -       27 Bacitracin - -       28 Formaldehyde - -       29 Methylchloroisothiazolinone / Methylisothiazolinone - -       30 Corticosteroid Mix CT - -       31 Sodium Lauryl Sulfate - -       32 Lanolin Alcohol - -       33 Turpentine - -       34 Cetylstearylalcohol - -       35 Chlorhexidine Dicluconate - -       36 Budesonide - -       37 Imidazolidinyl Urea  - -       38 Ethyl-2 Cyanoacrylate - -       39 Quaternium 15 (Dowicil 200) - -       40 Decyl Glucoside - -     Compositae Mix II - common yarrow, mountain  arnica, Italian chamomile, feverfew, and the common tansy   PRESERVATIVES & ANTIMICROBIALS        # Substance 2 days 4 days remarks   41 1 1,2-Benzisothiazoline-3-One, Sodium Salt - -     2 1,3,5-Sanjay (2-Hydroxyethyl) - Hexahydrotriazine (Grotan BK) - -     3 Dichlorophene - -     4 3, 4, 4' - Triclocarban - -    45 5 4 - Chloro - 3 - Cresol - -     6 4 - Chloro - 4 - Xylenol (PCMX) - -     7 7-Ethylbicyclooxazolidine (Bioban [id number]) - -     8 Benzalkonium Chloride CT - -     9 Benzyl Alcohol - -    50 10 Cetalkonium Chloride - -     11 Cetylpyrimidine Chloride  - -     12 Chloroacetamide - -     13 DMDM Hydantoin - -     14 Glutaraldehyde - -    55 15 Triclosan - -     16 Glyoxal Trimeric Dihydrate - -     17 Iodopropynyl Butylcarbamate - -     18 Octylisothiazoline - -     19 Acetophenone Azine - -    60 20 Bioban P 1487 (Nitrobutyl) Morpholine/(Ethylnitro-Trimethylene) Dimorpholine - -     21 Phenoxyethanol - -     22 Phenyl Salicylate - -     23 Povidone Iodine - -     24 Sodium Benzoate - -    65 25 Sodium Disulfite - -     26 Sorbic Acid - -     27 Thimerosal - -     28 Melamine Formaldehyde Resin - -     29 Ethylenediamine Dihydrochloride - -      Parabens      70 30 Butyl-P-Hydroxybenzoate - -     31 Ethyl-P-Hydroxybenzoate - -     32 Methyl-P-Hydroxybenzoate - -    73 33 Propyl-P-Hydroxybenzoate - -     EMULSIFIERS & ADDITIVES    # Substance 2 days 4 days remarks   74 1 Polyethylene Glycol-400 - -    75 2 Cocamidopropyl Betaine - -     3 Amerchol L101 - -     4 Propylene Glycol - -     5 Triethanolamine - -     6 Sorbitane Sesquiolate CT - -    80 7 Isopropylmyristate - -     8 Polysorbate 80 CT - -     9 Amidoamine   (Stearamidopropyl Dimethylamine) - -     10 Oleamidopropyl Dimethylamine - -     11 Lauryl Glucoside - -    85 12 Coconut Diethanolamide  - -     13 2-Hydroxy-4-Methoxy Benzophenone (Oxybenzone) - -     14 Benzophenone-4 (Sulisobenzon) - -     15 Propolis - -     16 Dexpanthenol - -    90 17  Abitol (Hydroabietyl Alcohol) - -     18 Tert-Butylhydroquinone - -     19 Benzyl Salicylate - -     20 Dimethylaminopropylamin (DMPA) - -     21 Zinc Pyrithione (Zinc Omadine)  - -    95 22 Sanjay(Hydroxymethyl) Nitromethane  - -      Antioxidant       23 Dodecyl Gallate - -     24 Butylhydroxyanisole (BHA) - -     25 Butylhydroxytoluene (BHT) - -     26 Di-Alpha-Tocopherol (Vit E) - -    100 27 Propyl Gallate - -     PERFUMES, FLAVORS & PLANTS        # Substance 2 days 4 days remarks   101 1 Benzyl Cinnamate - -     2 Di-Limonene (Dipentene) - -     3 Cananga Odorata (Scott Chavez) (I) - -     4 Lichen Acid Mix - -    105 5 Mentha Piperita Oil (Peppermint Oil) - -     6 Sesquiterpenelactone mix - -     7 Tea Tree Oil, Oxidized - -     8 Wood Tar Mix - -     9 Abietic Acid - -    110 10 Lavendula Angustifolia Oil (Lavender Oil) - -     11 Fragrance mix II CT * 14% - -      Fragrance Mix I       12 [place] Absolute - -     13 Eugenol - -     14 Geraniol - -    115 15 Hydroxycitronellal - -     16 Isoeugenol - -     17 Cinnamic Aldehyde - -     18 Cinnamic Alcohol  - -      Fragrance mix II       19 Citronellol - -    120 20 Alpha-Hexylcinnamic Aldehyde    - -     21 Citral - -     22 Farnesol - -    123 23 Coumarin - -    Hexylcinnamic aldehyde, Coumarin, Farnesol, Hydroxyisohexy3-cyclohexene carboxaldehyde, citral, citrolellol  CORTICOSTEROIDS   # Substance 2 days 4 days remarks Allergy  class   124 1 Amcinonide - -  B   125 2 Betametasone-17,21 Dipropionate - -  D1    3 Desoximetasone - -  C    4 Betamethasone-17-Valerate - -  D1    5 Hydrocortisone - -  A    6 Clobetasol-17-Propionate - -  D1   130 7 Dexamethasone-21-Phosphate Disodium Salt - -  C    8 Hydrocortisone-17 Butyrate - -  D2    9 Prednisolone - -  A    10 Triamcinolone Acetonide - -  B    11 Methylprednisolone Aceponate - -  D2   135 12 Hydrocortisone-21-Acetate - -  A   136 13 Prednicarbate - -  D2     Group Characteristics of group Generic name Name   cross reactions   A Hydrocortisone   Cloprednole, Fludrocortisone acétate, Hydrocortison acetate, Methylprednisolone, Prednisolone, Tixocortolpivalate Alfacortone, Fucidin H, Dermacalm, Hexacortone, Premandole, Imacort With group D2   B Triamcinolone-acetonide   Budenoside (R-isomer), Amcinonide, Desonide, Fluocinolone acetonide, Triamcinolone acetonide Locapred, Locatop  Synalar, Pevisone, [place]ort -   C Betamethasone (Without Bernadette)   Betamethasone, Dexamethasone, Flumethasone pivalate, Halomethasone Daivobet, Dexasalyl, Locasalen,   -   D1 Betamethasone-diproprionate   Betamethasone dipropionate, Betamethasone-17-valerate, Clobetasole-propionate, Fluticasone propionate, Mometasone furoate Betnovate, Diprogenta, Diprosalic, Diprosone, Celestoderm, Fucicort,  Cutivate, Axotide, Elocom -   D2 Methylprednisolone-aceponate   Hydrocortisone-aceponate, Hydrocortisone-buteprate, Hydrocortisone-17-butyrate, Methylprednisolone aceponate, Prednicarbate Locoïd, Advantan,  Prednitop With group A and Budesonide (S-isomer)      Results of patch tests:                         Interpretation:  - Negative                    A    = Allergic      (+) Erythema    TI   = Toxic/irritant   + E + Infiltration    RaP = Relevance at Present     ++ E/I + Papulovesicle   Rpr  = Relevance Previously     +++ E/I/P + Blister     nR   = No Relevance     [] No relevant allergic reaction observed    [x] Allergic reaction diagnosed against following allergens:    +/++ Nickel      Interpretation/ remarks:   Patients patch tests only show reaction to Nickel, but this is probably part of the atopy. No other sign for contact allergy and therefore based on clinic and test results most likely atopic dermatitis    [x] Patient information given   [] ACDS CAMP information's (# ....) to following compounds: .....   [x] General information's to following compounds: Nickel      ____________________________________________    Assessment & Plan:    ==> Final  Diagnosis:     # Generalized dry skin with hyperpigmented eczematous lesions on head, extremities, and trunk  >> mostly atopic dermatitis  > no clear signs for contact sensitization (just allergy to Nickel)  * chronic illness with exacerbation, progression, side effects from treatment    # atopy?  Generally dry, hyperirritable skin  Nickel allergy  No inhalant allergies   Prick testing negative   Personal and family history for atopy negative   * stable chronic illness    In her labs there were no signs for eosinophilia in peripheral blood and kidney and liver tests were normal. Her TSH was also normal, so there are no signs for thyroid disease.        These conclusions are made at the best of one's knowledge and belief based on the provided evidence such as patient's history and allergy test results and they can change over time or can be incomplete because of missing information.    ==> Treatment Plan:    >> treatment as atopic dermatitis  - Start Adbry (Tralokinumab-ldrm) 600 mg (loading dose) and 300 mg every 14 days.  Disp-4 mL, R-11  - Referred to MTM team for assistance.    >> use regularly moisturizer against dry skin (e.g. Vanicream, CeraVe, Cetaphil)    >> avoid using essential oils and topical application of citrus juice, because they induce irritations      Staff Involved: Provider and Staff      Follow-up in Derm-Allergy clinic in about 3 months  ___________________________    I spent a total of 30 minutes with Robert Anand during today s  visit. This time was spent discussing all the individual test results, correlating them to the clinical relevance, counseling the patient and/or coordinating care        Again, thank you for allowing me to participate in the care of your patient.        Sincerely,        Juve Ortega MD    Electronically signed

## 2025-05-09 NOTE — PROGRESS NOTES
[facility] Dermato-allergology Note  Office visit  Encounter Date: May 9, 2025  ____________________________________________    CC: Allergy Testing Followup (Patch day 5)    HPI:  (May 9, 2025)  Ms. Robert Anand is a(n) 35 year old female who presents today as a return patient for allergy tests as planned  - Follow-up in Derm-Allergy clinic for 2nd readings and final conclusions after 4 days   - Otherwise feeling well in usual state of health    Physical Exam:  General: In no acute distress, well-developed, well-nourished  Eyes: no conjunctivitis  ENT: no signs of rhinitis   Pulmonary: no wheezing or coughing  Skin: Focused examination of the skin on test sites was performed = see test results below    Earlier History and Allergy Exams:  (May 5, 2025)   Presents today as a return patient for allergy tests as planned  - Follow-up in Derm-Allergy clinic for patch testing as planned   - Otherwise feeling well in usual state of health  (Apr 30, 2025)  Presents today as new patient for allergy consultation  - Referred by Dr. Carolyn Gray on 2/15/24 for dermatitis.   - 9/14/2023 OV visit with Dr. Carolyn Gray (Derm)  FROM HPI:  Ms. Robert Anand is a(n) 33 year old female who presents today as a return.   She is 6 months pregnant. She stopped all topicals. Is itchy and dark again.     FROM A&P:  # Dermatitis, neck hands, itchy. Pregnant she reports for 6 months  Stop elidel(pimecrolimus and tretinoin if using)     Switch to hydrocortisone 2.5% cream : Apply twice daily for 1 week, then 3 times weekly for 1 week, then repeat, reviewed risks with pregnancy  -reviewed skin thinning     For the body, start vanicream twice daily from the neck down.  -referral Dr. Ortega  -[facility]-dupixent and return to tacrolimus inhibitor. COnsider new topical eucrisa also     # Acne vulgaris.   #melasma  -pt stopped tretinoin  -sunscreen    - 2/15/2024 VV visit with Dr. Carolyn Gray (Derm)  FROM A&P:  # Eczematous  dermatitis, neck, arms, face with PIH. Patient is breastfeeding. I have never seen in person but  has. - last visit topicals adjusted. Dupixent was considered . Dr. Ortega referral for patch testing  - Lidex for up to 2 weeks per month on trunk and extemities  - ELIDEL BID for face   - Future consideration: Dupixent      # Acne vulgaris  -hold that   # Melasma.  -hold     Skin: Focused examination of the skin on test sites was performed = see test results below  Waist-up skin, which includes the head/face, neck, both arms, chest, back, abdomen, digits and/or nails was examined.  - Generally very dry scaly skin with hyperpigmentation  - Some specials lesion on the neck and fontal hairline, little bit hyperkeratosis     Past Medical History:   Patient Active Problem List   Diagnosis    Normal pregnancy in third trimester    GBS (group B Streptococcus carrier), +RV culture, currently pregnant    Term pregnancy    Gestational hypertension     No past medical history on file.    Allergies:  No Known Allergies    Medications:  Current Outpatient Medications   Medication Sig Dispense Refill    cyclobenzaprine (FLEXERIL) 5 MG tablet Take 1 tablet (5 mg) by mouth 3 times daily as needed for muscle spasms. 60 tablet 1    ibuprofen (ADVIL/MOTRIN) 600 MG tablet Take 1 tablet (600 mg) by mouth every 6 hours as needed for moderate pain. 60 tablet 2    Tralokinumab-ldrm (ADBRY) 300 MG/2ML SOAJ Inject 600 mg subcutaneously See Admin Instructions for 1 dose. followed by 300 mg every other week. 6 mL 0    Tralokinumab-ldrm (ADBRY) 300 MG/2ML SOAJ Inject 300 mg subcutaneously every 14 days. 4 mL 11    triamcinolone (KENALOG) 0.1 % external cream Apply topically 2 times daily. (Not more than 2 weeks) 30 g 0     Current Facility-Administered Medications   Medication Dose Route Frequency Provider Last Rate Last Admin    medroxyPROGESTERone (DEPO-PROVERA) injection 150 mg  150 mg Intramuscular Q90 Days            Previous Labs,  Allergy Tests, Dermatopathology, Imaging:  N/a    Referred By: Carolyn Gray MD  [address] [facility] [address]  [place] [address]     Allergy Tests:  Past Allergy Test    Family History:  No family history on file.    Social History:  The patient works part-time at [employer].   Patient has the following hobbies or non-occupational exposure: n/a.    Order for Future Allergy Testing:    [x] Outpatient  [] Inpatient: [facility]..../ Bed ...    Skin Atopy (atopic dermatitis)? [x] Yes     [] No  Comments:   - sent by Dr. Gray for recurrent eczema   - has constant itching   - triamcinolone did not really help, caused more itching     Childhood eczema?   [] Yes     [x] No  Comments:   Hand eczema?   [] Yes     [x] No  If yes, leading hand? [] Right     [] Left     [] Ambidextrous  Comments:     Contact allergies?     Comments:   Including adhesives/bandages? [] Yes     [x] No  Comments:   Including metals?   [] Yes     [x] No  Comments:   - does not really wear jewelry  Other substances?   [] Yes     [x] No  Comments:     Drug allergies?   [] Yes     [x] No  Comments:   - stopped flexeril as it induced more itching   - no other medications     Angioedema?   [] Yes     [x] No  Comments:     Urticaria?   [] Yes     [x] No  Comments:     Food allergies?   [] Yes     [x] No  Comments:     Pet allergies?   [] Yes     [x] No  Comments:     Environmental allergy symptoms?  [] Conjunctivitis  [] Otitis  [] Pharyngitis  [] Polyposis  [] Postnasal Drip  [] Rhinitis  [] Sinusitis  [x] None  Comments:     HENT Operations?  [] Adenoids [] Septum [] Sinus  [] Tonsils        [] Other:   [x] None  Comments:     Pulmonary symptoms (from birth to present)?  [] Asthma bronchiale  Inhaler(s)?:   [] Coughing  [] Other  [x] None  Comments:     Environmental and pulmonary symptoms aggravated by?  Season: [x] None     [] I     [] II     [] III     [] IV     [] V     [] VI          [] VII     [] VIII     [] IX     [] X     [] XI     []  XII     [] Perennial  Time of Day: [x] None     [] Morning     [] Noon     [] Evening     [] Night     [] Whole Day  Location/Changes: [x] None     [] Inside     [] Outside     [] Mountain     [] Sea     [] Other:   Triggers (specific): [x] None     [] Animals     [] Dust     [] Mold     [] Plants     [] Other:   Triggers (other): [x] None     [] Psyche     [] Sport     [] Work     [] Other:   Irritant: [x] None     [] Cold     [] Heat     [] Odors     [] Physical Efforts     [] Smoke     [] Other:     Order for PATCH TESTS  Reason for tests (suspected allergy): possible contact allergy  Known previous allergies: none confirmed   Standardized panels  [x] Standard panel (40 tests)  [x] Preservatives & Antimicrobials (31 tests)  [x] Emulsifiers & Additives (25 tests)   [x] Perfumes/Flavours & Plants (25 tests)  [] Hairdresser panel (12 tests)  [] Rubber Chemicals (22 tests)  [] Plastics (26 tests)  [] Colorants/Dyes/Food additives (20 tests)  [] Metals (implants/dental) (24 tests)  [] Local anaesthetics/NSAIDs (13 tests)  [] Antibiotics & Antimycotics (14 tests)   [x] Corticosteroids (15 tests)   [] Photopatch test (62 tests)   [] Others:     [] Patient's Own Products:   DO NOT test if chemical or biological identity is unknown!   always ask from patient the product information and safety sheets (MSDS)       Order for PRICK TESTS    Reason for tests (suspected allergy): atopy?  Known previous allergies: none confirmed     Standardized prick panels  [x] Atopic panel (20 tests)  [] Pediatric Panel (12 tests)  [] Milk, Meat, Eggs, Grains (20 tests)   [] Dust, Epithelia, Feathers (10 tests)  [] Fish, Seafood, Shellfish (17 tests)  [] Nuts, Beans (14 tests)  [] Spice, Vegetable, Fruit (17 tests)  [] Pollen Panel = Tree, Grass, Weed (24 tests)  [] Others:     [] Patient's Own Products:   DO NOT test if chemical or biological identity is unknown!   always ask from patient the product information and safety sheets (MSDS)      Standardized intradermal tests  [] Alternaria alternata  [] Aspergillus fumigatus  [] Cladosporium herbarum   [] Penicillium notatum  [] Dermatophagoides farinae  [] Dermatophagoides pteronyssinus  [] Dog Epithelium  [] Cat Epithelium  [] Others:     [] Bee venom   [] Wasp venom  !!Specific protocol with dilutions!!       Order for Drug allergy tests (prick & intradermal & patch tests)    [] Penicillin G     [] Ampicillin   [] Cefazolin (1st gen)     [] Cefuroxime (2nd gen)     [] Ceftriaxone (3rd gen)     [] Ceftazidime (3rd gen)     [] Cefepime (4th gen)       [] Bactrim  [] Iodixanol (Visipaque)     [] Iopamidol (Isovue)       [] Iohexol (Omnipaque)  [] Others:   [] Patient's Own:   Order for .... as test date        Atopy Screen (Placed Apr 30, 2025)  No Substance Readings (15 min) Evaluation   POS Histamine 1mg/ml ++    NEG NaCl 0.9% -      No Substance Readings (15 min) Evaluation   1 Alternaria alternata (tenuis)  -    2 Cladosporium herbarum -    3 Aspergillus fumigatus -    4 Penicillium notatum -    5 Dermatophagoides pteronyssinus -    6 Dermatophagoides farinae -    7 Dog epithelium (canis spp) -    8 Cat hair (karla catus) -    9 Cockroach   (Blatella americana & germanica) -    10 Grass mix midwest   (Lora, Orchard, Redtop, Silvino) -    11 Michael grass (sorghum halepense) -    12 Weed mix   (common Cocklebur, Lamb s quarters, rough redroot Pigweed, Dock/Sorrel) -    13 Mug wort (artemisia vulgare) -    14 Ragweed giant/short (ambrosia spp) -    15 White birch (Betula papyrifera) -    16 Tree mix 1 (Pecan, Maple BHR, Oak RVW, american [place], black [place]) -    17 Red cedar (juniperus virginia) -    18 Tree mix 2   (white Cam, river/red Birch, black [place], common [place], american Elm) -    19 Box elder/Maple mix (acer spp) -    20 Tyler shagbark (carya ovata) -           Conclusion  ________________________________      RESULTS & EVALUATION of PATCH TESTS    May 5, 2025 application of  patch tests:    Patch test readings after     [x] 2 days, [] 3 days [x] 4 days, [] 5 days,  Other duration: ...    STANDARD Series                                          # Substance 2 days 4 days remarks     1 Arnel Mix III 10% - -       2 Colophony - -       3  2-Mercaptobenzothiazole  - -       4 Methylisothiazolinone - -       5 Carba Mix - -       6 Thiuram Mix [A] - -       7 Bisphenol A Epoxy Resin - -       8 P-Cwnp-Rehnvkibbbx-Formaldehyde Resin - -       9 Mercapto Mix [A] - -       10 Black Rubber Mix- PPD [B] - -       11 Potassium Dichromate  -  -       12 Balsam of [place] (Myroxylon Pereirae Resin) - -       13 Nickel Sulphate Hexahydrate - +/++       14 Mixed Dialkyl Thiourea - -       15 Paraben Mix [B] - -       16 Methyldibromo Glutaronitrile - -       17 Fragrance Mix 8% - -       18 2-Bromo-2-Nitropropane-1,3-Diol (Bronopol) CT - -       19 Lyral - -       20 Tixocortol-21- Pivalate CT - -       21 Diazolidinyl urea (Germall II) - -        22 Methyl Methacrylate - -       23 Cobalt (II) Chloride Hexahydrate - -       24 Fragrance Mix II  - -       25 Compositae Mix II - -       26 Benzoyl Peroxide - -       27 Bacitracin - -       28 Formaldehyde - -       29 Methylchloroisothiazolinone / Methylisothiazolinone - -       30 Corticosteroid Mix CT - -       31 Sodium Lauryl Sulfate - -       32 Lanolin Alcohol - -       33 Turpentine - -       34 Cetylstearylalcohol - -       35 Chlorhexidine Dicluconate - -       36 Budesonide - -       37 Imidazolidinyl Urea  - -       38 Ethyl-2 Cyanoacrylate - -       39 Quaternium 15 (Dowicil 200) - -       40 Decyl Glucoside - -     Compositae Mix II - common yarrow, mountain arnica, Bangladeshi chamomile, feverfew, and the common tansy   PRESERVATIVES & ANTIMICROBIALS        # Substance 2 days 4 days remarks   41 1 1,2-Benzisothiazoline-3-One, Sodium Salt - -     2 1,3,5-Snajay (2-Hydroxyethyl) - Hexahydrotriazine (Grotan BK) - -     3 Dichlorophene - -     4 3, 4,  4' - Triclocarban - -    45 5 4 - Chloro - 3 - Cresol - -     6 4 - Chloro - 4 - Xylenol (PCMX) - -     7 7-Ethylbicyclooxazolidine (Bioban AJ6753) - -     8 Benzalkonium Chloride CT - -     9 Benzyl Alcohol - -    50 10 Cetalkonium Chloride - -     11 Cetylpyrimidine Chloride  - -     12 Chloroacetamide - -     13 DMDM Hydantoin - -     14 Glutaraldehyde - -    55 15 Triclosan - -     16 Glyoxal Trimeric Dihydrate - -     17 Iodopropynyl Butylcarbamate - -     18 Octylisothiazoline - -     19 Acetophenone Azine - -    60 20 Bioban P 1487 (Nitrobutyl) Morpholine/(Ethylnitro-Trimethylene) Dimorpholine - -     21 Phenoxyethanol - -     22 Phenyl Salicylate - -     23 Povidone Iodine - -     24 Sodium Benzoate - -    65 25 Sodium Disulfite - -     26 Sorbic Acid - -     27 Thimerosal - -     28 Melamine Formaldehyde Resin - -     29 Ethylenediamine Dihydrochloride - -      Parabens      70 30 Butyl-P-Hydroxybenzoate - -     31 Ethyl-P-Hydroxybenzoate - -     32 Methyl-P-Hydroxybenzoate - -    73 33 Propyl-P-Hydroxybenzoate - -     EMULSIFIERS & ADDITIVES    # Substance 2 days 4 days remarks   74 1 Polyethylene Glycol-400 - -    75 2 Cocamidopropyl Betaine - -     3 Amerchol L101 - -     4 Propylene Glycol - -     5 Triethanolamine - -     6 Sorbitane Sesquiolate CT - -    80 7 Isopropylmyristate - -     8 Polysorbate 80 CT - -     9 Amidoamine   (Stearamidopropyl Dimethylamine) - -     10 Oleamidopropyl Dimethylamine - -     11 Lauryl Glucoside - -    85 12 Coconut Diethanolamide  - -     13 2-Hydroxy-4-Methoxy Benzophenone (Oxybenzone) - -     14 Benzophenone-4 (Sulisobenzon) - -     15 Propolis - -     16 Dexpanthenol - -    90 17 Abitol (Hydroabietyl Alcohol) - -     18 Tert-Butylhydroquinone - -     19 Benzyl Salicylate - -     20 Dimethylaminopropylamin (DMPA) - -     21 Zinc Pyrithione (Zinc Omadine)  - -    95 22 Sanjay(Hydroxymethyl) Nitromethane  - -      Antioxidant       23 Dodecyl Gallate - -     24  Butylhydroxyanisole (BHA) - -     25 Butylhydroxytoluene (BHT) - -     26 Di-Alpha-Tocopherol (Vit E) - -    100 27 Propyl Gallate - -     PERFUMES, FLAVORS & PLANTS        # Substance 2 days 4 days remarks   101 1 Benzyl Cinnamate - -     2 Di-Limonene (Dipentene) - -     3 Cananga Odorata (Scott Chavez) (I) - -     4 Lichen Acid Mix - -    105 5 Mentha Piperita Oil (Peppermint Oil) - -     6 Sesquiterpenelactone mix - -     7 Tea Tree Oil, Oxidized - -     8 Wood Tar Mix - -     9 Abietic Acid - -    110 10 Lavendula Angustifolia Oil (Lavender Oil) - -     11 Fragrance mix II CT * 14% - -      Fragrance Mix I       12 [place]moss Absolute - -     13 Eugenol - -     14 Geraniol - -    115 15 Hydroxycitronellal - -     16 Isoeugenol - -     17 Cinnamic Aldehyde - -     18 Cinnamic Alcohol  - -      Fragrance mix II       19 Citronellol - -    120 20 Alpha-Hexylcinnamic Aldehyde    - -     21 Citral - -     22 Farnesol - -    123 23 Coumarin - -    Hexylcinnamic aldehyde, Coumarin, Farnesol, Hydroxyisohexy3-cyclohexene carboxaldehyde, citral, citrolellol  CORTICOSTEROIDS   # Substance 2 days 4 days remarks Allergy  class   124 1 Amcinonide - -  B   125 2 Betametasone-17,21 Dipropionate - -  D1    3 Desoximetasone - -  C    4 Betamethasone-17-Valerate - -  D1    5 Hydrocortisone - -  A    6 Clobetasol-17-Propionate - -  D1   130 7 Dexamethasone-21-Phosphate Disodium Salt - -  C    8 Hydrocortisone-17 Butyrate - -  D2    9 Prednisolone - -  A    10 Triamcinolone Acetonide - -  B    11 Methylprednisolone Aceponate - -  D2   135 12 Hydrocortisone-21-Acetate - -  A   136 13 Prednicarbate - -  D2     Group Characteristics of group Generic name Name  cross reactions   A Hydrocortisone   Cloprednole, Fludrocortisone acétate, Hydrocortison acetate, Methylprednisolone, Prednisolone, Tixocortolpivalate Alfacortone, Fucidin H, Dermacalm, Hexacortone, Premandole, Imacort With group D2   B Triamcinolone-acetonide   Budenoside  (R-isomer), Amcinonide, Desonide, Fluocinolone acetonide, Triamcinolone acetonide Locapred, Locatop  Synalar, Pevisone, Kenacort -   C Betamethasone (Without Bernadette)   Betamethasone, Dexamethasone, Flumethasone pivalate, Halomethasone Daivobet, Dexasalyl, Locasalen,   -   D1 Betamethasone-diproprionate   Betamethasone dipropionate, Betamethasone-17-valerate, Clobetasole-propionate, Fluticasone propionate, Mometasone furoate Betnovate, Diprogenta, Diprosalic, Diprosone, Celestoderm, Fucicort,  Cutivate, Axotide, Elocom -   D2 Methylprednisolone-aceponate   Hydrocortisone-aceponate, Hydrocortisone-buteprate, Hydrocortisone-17-butyrate, Methylprednisolone aceponate, Prednicarbate Locoïd, Advantan,  Prednitop With group A and Budesonide (S-isomer)      Results of patch tests:                         Interpretation:  - Negative                    A    = Allergic      (+) Erythema    TI   = Toxic/irritant   + E + Infiltration    RaP = Relevance at Present     ++ E/I + Papulovesicle   Rpr  = Relevance Previously     +++ E/I/P + Blister     nR   = No Relevance     [] No relevant allergic reaction observed    [x] Allergic reaction diagnosed against following allergens:    +/++ Nickel      Interpretation/ remarks:   Patients patch tests only show reaction to Nickel, but this is probably part of the atopy. No other sign for contact allergy and therefore based on clinic and test results most likely atopic dermatitis    [x] Patient information given   [] ACDS CAMP information's (# ....) to following compounds: .....   [x] General information's to following compounds: Nickel      ____________________________________________    Assessment & Plan:    ==> Final Diagnosis:     # Generalized dry skin with hyperpigmented eczematous lesions on head, extremities, and trunk  >> mostly atopic dermatitis  > no clear signs for contact sensitization (just allergy to Nickel)  * chronic illness with exacerbation, progression, side effects from  treatment    # atopy?  Generally dry, hyperirritable skin  Nickel allergy  No inhalant allergies   Prick testing negative   Personal and family history for atopy negative   * stable chronic illness    In her labs there were no signs for eosinophilia in peripheral blood and kidney and liver tests were normal. Her TSH was also normal, so there are no signs for thyroid disease.        These conclusions are made at the best of one's knowledge and belief based on the provided evidence such as patient's history and allergy test results and they can change over time or can be incomplete because of missing information.    ==> Treatment Plan:    >> treatment as atopic dermatitis  - Start Adbry (Tralokinumab-ldrm) 600 mg (loading dose) and 300 mg every 14 days.  Disp-4 mL, R-11  - Referred to MTM team for assistance.    >> use regularly moisturizer against dry skin (e.g. Vanicream, CeraVe, Cetaphil)    >> avoid using essential oils and topical application of citrus juice, because they induce irritations      Staff Involved: Provider and Staff      Follow-up in Derm-Allergy clinic in about 3 months  ___________________________    I spent a total of 30 minutes with Robert Anand during today s  visit. This time was spent discussing all the individual test results, correlating them to the clinical relevance, counseling the patient and/or coordinating care

## 2025-05-12 NOTE — TELEPHONE ENCOUNTER
MD discussed issue with pt at 5/9/25 appt, clarified that if pt has insurance questions or questions about use, pt to contact MTM team for appt

## 2025-06-28 ENCOUNTER — MYC MEDICAL ADVICE (OUTPATIENT)
Dept: ALLERGY | Facility: CLINIC | Age: 35
End: 2025-06-28
Payer: COMMERCIAL

## 2025-07-01 NOTE — TELEPHONE ENCOUNTER
Janine Bustamante, RN to Me (Selected Message)        7/1/25  3:06 PM  Hi Sangita,     Dr. Ortega said she could see any derm provider (not necessarily Dr. Wilburn for hair loss just yet). Just to help get the scalp under control.     Thank you,     Janine

## 2025-07-01 NOTE — TELEPHONE ENCOUNTER
Writer spoke with patient with the assistance of an . There was a cancellation today with Dr. Wilburn, but the patient is unable to take this spot today.    Ama Palencia LPN

## 2025-07-01 NOTE — TELEPHONE ENCOUNTER
Juve Ortega MD to [facility] Dermatology Adult [facility]  Tasha George MA  [facility] Allergy [facility]      7/1/25  9:59 AM  Could Dermatology take this patient over as soon as possible. I did all the patch tests necessary and it seems she has mostly problems with her scalp.  Do you need a referral?  Thanks a lot     Juve Ortega

## 2025-07-22 ENCOUNTER — OFFICE VISIT (OUTPATIENT)
Dept: DERMATOLOGY | Facility: CLINIC | Age: 35
End: 2025-07-22
Payer: COMMERCIAL

## 2025-07-22 DIAGNOSIS — L65.9 ALOPECIA: ICD-10-CM

## 2025-07-22 PROCEDURE — 88305 TISSUE EXAM BY PATHOLOGIST: CPT | Mod: TC | Performed by: STUDENT IN AN ORGANIZED HEALTH CARE EDUCATION/TRAINING PROGRAM

## 2025-07-22 PROCEDURE — 88305 TISSUE EXAM BY PATHOLOGIST: CPT | Mod: 26 | Performed by: DERMATOLOGY

## 2025-07-22 RX ORDER — CLOBETASOL PROPIONATE 0.5 MG/ML
SOLUTION TOPICAL
Qty: 50 ML | Refills: 2 | Status: SHIPPED | OUTPATIENT
Start: 2025-07-22

## 2025-07-22 ASSESSMENT — PAIN SCALES - GENERAL: PAINLEVEL_OUTOF10: NO PAIN (0)

## 2025-07-22 NOTE — Clinical Note
7/22/2025       RE: Robert Anand  [id number] [place] [address]     Dear Colleague,    Thank you for referring your patient, Robert Anand, to the [facility] [place] [facility]. Please see a copy of my visit note below.    [facility] Dermatology Note  Encounter Date: Jul 22, 2025    Dermatology Problem List:  # Scarring Alopecia  - ddx: CCCA vs LPP  - s/p 3 mm punch biopsy 7/22/25  - Tx: clobetasol solution BID   # Generalized dry skin with hyperpigmented eczematous lesions favoring atopic dermatitis   - tx: tralokinumab- Idrm biweekly   Patch Testing: Nickel allergy, No inhalant allergies    Assessment & Plan:  # Concern for scarring alopecia  Differential diagnosis includes CCCA, LPP vs FAPD.  Due to the location of the hair loss on the vertex scalp with associated perifollicular scale, adherent scale, and perifollicular halos, we opted for a scalp biopsy to diagnose scarring alopecia.  There is no change in eyebrow or eyelash density.  There is a fringe sign with receeding hair line of the frontal scalp. The patient endorses pruritus and tenderness of the scalp with an increasing alopecia plaque on the vertex scalp.  Started clobetasol solution for treatment and for pruritus.  - s/p 3 mm punch biopsy 7/22/25 on the vertex scalp and right parietal scalp   - Start applying clobetasol solution on the scalp BID     Procedures Performed:   - Punch biopsy procedure note, location(s): vertex scalp and right parietal scalp. After discussion of benefits and risks including but not limited to bleeding, infection, scar, incomplete removal, recurrence, and non-diagnostic biopsy, written consent and photographs were obtained. The area was cleaned with isopropyl alcohol. 0.5mL of 1% lidocaine with epinephrine was injected to obtain adequate anesthesia and a 3 mm punch biopsy was performed at site(s). 4-0 Prolene  sutures were utilized to approximate the epidermal edges. White petrolatum ointment and a bandage was applied to the wound. Explicit verbal and written wound care instructions were provided. The patient left the dermatology clinic in good condition.     Follow-up: pending path results  Staff (Dr. STEFANIE Guaman)/Resident (Ghulam)  ____________________________________________    CC: Derm Problem (Robert reports dry skin on her scalp, itchiness, pain, and hair loss, presenting at the beginning of this year. )      HPI:  Ms. Robert Anand is a 35 year old female who presents as a new patient for hair loss.   - Onset of hair loss: 6 months  - Affected areas: vertex scalp  - Scalp pain: yes  - Scalp burning: no  - Scalp itching: yes  - Overall course: worsening   - Current treatments: nothing   - Hair care practices: She washes her hair every day with a coconut based shampoo.  States that the bald spot started at the center of her scalp and has now been spreading outward since the itching and pain has also followed that same pattern as well.  She believes that this could be due to to the dermatitis that she is being treated for with Adbry.     No other concerns today and in general state of health.     Labs:  Reviewed.    Physical Exam:  Vitals: There were no vitals taken for this visit.  GEN: Well developed, well-nourished, in no acute distress, in a pleasant mood.    SKIN: Focused examination of the scalp, face, hands showed  - alopecic plaque on the vertex scalp with associated perifollicular scale, adherent scale, and perifollicular halos  - no perifollicular erythema  - Eyelashes and eyebrows normal density  - R lateral forehead vein: neutral  - Midline forehead vein: neutral  - L ateral forehead vein: neutral                  Medications:  Current Outpatient Medications   Medication Sig Dispense Refill    clobetasol (TEMOVATE) 0.05 % external solution Apply to the scalp twice day for itching 50 mL 2     Tralokinumab-ldrm (ADBRY) 300 MG/2ML SOAJ Inject 300 mg subcutaneously every 14 days. 4 mL 11    cyclobenzaprine (FLEXERIL) 5 MG tablet Take 1 tablet (5 mg) by mouth 3 times daily as needed for muscle spasms. (Patient not taking: Reported on 7/22/2025) 60 tablet 1    ibuprofen (ADVIL/MOTRIN) 600 MG tablet Take 1 tablet (600 mg) by mouth every 6 hours as needed for moderate pain. (Patient not taking: Reported on 7/22/2025) 60 tablet 2    Tralokinumab-ldrm (ADBRY) 300 MG/2ML SOAJ Inject 600 mg subcutaneously See Admin Instructions for 1 dose. followed by 300 mg every other week. 6 mL 0    triamcinolone (KENALOG) 0.1 % external cream Apply topically 2 times daily. (Not more than 2 weeks) (Patient not taking: Reported on 7/22/2025) 30 g 0     Current Facility-Administered Medications   Medication Dose Route Frequency Provider Last Rate Last Admin    medroxyPROGESTERone (DEPO-PROVERA) injection 150 mg  150 mg Intramuscular Q90 Days           Past Medical/Surgical History:   Patient Active Problem List   Diagnosis    Normal pregnancy in third trimester    GBS (group B Streptococcus carrier), +RV culture, currently pregnant    Term pregnancy    Gestational hypertension     No past medical history on file.  Past Surgical History:   Procedure Laterality Date    laproscopy  2013         Again, thank you for allowing me to participate in the care of your patient.      Sincerely,    Mike Guaman MD

## 2025-07-22 NOTE — NURSING NOTE
Dermatology Rooming Note    Robert Annad's goals for this visit include:   Chief Complaint   Patient presents with    Derm Problem     Robert is reports dry skin on her scalp, itchiness, pain, and hair loss, presenting at the beginning of this year.      KALPESH Ordonez

## 2025-07-22 NOTE — PATIENT INSTRUCTIONS
Thank you for coming in today. We believe your hair loss could be do to a scarring alopecia. The two cases we're concerned about is central cicatrical centrifugal alopecia (CCCA) or lichenplano pilaris (LPP).     - PROCEDURE: Punch Biopsy  The risks and benefits of the procedure were described to the patient. These include but are not limited to bleeding, infection, scar, incomplete removal, and non-diagnostic biopsy. Written informed consent was obtained. The area was cleansed with an alcohol pad and injected with lidocaine with epinephrine (<1mL used). Once anesthesia was obtained, a 3 mm punch biopsy was performed. The tissue was placed in a labeled container with formalin and sent to pathology. The site was closed using 4-0 Prolene sutures. Vaseline and a bandage were applied to the wound. The patient tolerated the procedure well and was given post biopsy care instructions.

## 2025-07-22 NOTE — PROGRESS NOTES
[facility] Dermatology Note  Encounter Date: Jul 22, 2025    Dermatology Problem List:  # Scarring Alopecia  - ddx: CCCA vs LPP  - s/p 3 mm punch biopsy 7/22/25  - Tx: clobetasol solution BID   # Generalized dry skin with hyperpigmented eczematous lesions favoring atopic dermatitis   - tx: tralokinumab- Idrm biweekly   Patch Testing: Nickel allergy, No inhalant allergies    Assessment & Plan:  # Concern for scarring alopecia  Differential diagnosis includes CCCA, LPP vs FAPD.  Due to the location of the hair loss on the vertex scalp with associated perifollicular scale, adherent scale, and perifollicular halos, we opted for a scalp biopsy to diagnose scarring alopecia.  There is no change in eyebrow or eyelash density.  There is a fringe sign with receeding hair line of the frontal scalp. The patient endorses pruritus and tenderness of the scalp with an increasing alopecia plaque on the vertex scalp.  Started clobetasol solution for treatment and for pruritus.  - s/p 3 mm punch biopsy 7/22/25 on the vertex scalp and right parietal scalp   - Start applying clobetasol solution on the scalp BID     Procedures Performed:   - Punch biopsy procedure note, location(s): vertex scalp and right parietal scalp. After discussion of benefits and risks including but not limited to bleeding, infection, scar, incomplete removal, recurrence, and non-diagnostic biopsy, written consent and photographs were obtained. The area was cleaned with isopropyl alcohol. 0.5mL of 1% lidocaine with epinephrine was injected to obtain adequate anesthesia and a 3 mm punch biopsy was performed at site(s). 4-0 Prolene sutures were utilized to approximate the epidermal edges. White petrolatum ointment and a bandage was applied to the wound. Explicit verbal and written wound care instructions were provided. The patient left the dermatology clinic in good condition.     Follow-up: pending path results  Staff (Dr. STEFANIE Guaman)/Resident  (Ghulam)  ____________________________________________    CC: Derm Problem (Robert reports dry skin on her scalp, itchiness, pain, and hair loss, presenting at the beginning of this year. )      HPI:  Ms. Robert Anand is a 35 year old female who presents as a new patient for hair loss.   - Onset of hair loss: 6 months  - Affected areas: vertex scalp  - Scalp pain: yes  - Scalp burning: no  - Scalp itching: yes  - Overall course: worsening   - Current treatments: nothing   - Hair care practices: She washes her hair every day with a coconut based shampoo.  States that the bald spot started at the center of her scalp and has now been spreading outward since the itching and pain has also followed that same pattern as well.  She believes that this could be due to to the dermatitis that she is being treated for with Adbry.     No other concerns today and in general state of health.     Labs:  Reviewed.    Physical Exam:  Vitals: There were no vitals taken for this visit.  GEN: Well developed, well-nourished, in no acute distress, in a pleasant mood.    SKIN: Focused examination of the scalp, face, hands showed  - alopecic plaque on the vertex scalp with associated perifollicular scale, adherent scale, and perifollicular halos  - no perifollicular erythema  - Eyelashes and eyebrows normal density  - R lateral forehead vein: neutral  - Midline forehead vein: neutral  - L ateral forehead vein: neutral                  Medications:  Current Outpatient Medications   Medication Sig Dispense Refill    clobetasol (TEMOVATE) 0.05 % external solution Apply to the scalp twice day for itching 50 mL 2    Tralokinumab-ldrm (ADBRY) 300 MG/2ML SOAJ Inject 300 mg subcutaneously every 14 days. 4 mL 11    cyclobenzaprine (FLEXERIL) 5 MG tablet Take 1 tablet (5 mg) by mouth 3 times daily as needed for muscle spasms. (Patient not taking: Reported on 7/22/2025) 60 tablet 1    ibuprofen (ADVIL/MOTRIN) 600 MG tablet Take 1 tablet (600  mg) by mouth every 6 hours as needed for moderate pain. (Patient not taking: Reported on 7/22/2025) 60 tablet 2    Tralokinumab-ldrm (ADBRY) 300 MG/2ML SOAJ Inject 600 mg subcutaneously See Admin Instructions for 1 dose. followed by 300 mg every other week. 6 mL 0    triamcinolone (KENALOG) 0.1 % external cream Apply topically 2 times daily. (Not more than 2 weeks) (Patient not taking: Reported on 7/22/2025) 30 g 0     Current Facility-Administered Medications   Medication Dose Route Frequency Provider Last Rate Last Admin    medroxyPROGESTERone (DEPO-PROVERA) injection 150 mg  150 mg Intramuscular Q90 Days           Past Medical/Surgical History:   Patient Active Problem List   Diagnosis    Normal pregnancy in third trimester    GBS (group B Streptococcus carrier), +RV culture, currently pregnant    Term pregnancy    Gestational hypertension     No past medical history on file.  Past Surgical History:   Procedure Laterality Date    laproscopy  2013

## 2025-07-24 LAB
PATH REPORT.COMMENTS IMP SPEC: NORMAL
PATH REPORT.FINAL DX SPEC: NORMAL
PATH REPORT.GROSS SPEC: NORMAL
PATH REPORT.MICROSCOPIC SPEC OTHER STN: NORMAL
PATH REPORT.RELEVANT HX SPEC: NORMAL

## 2025-07-29 ENCOUNTER — MYC MEDICAL ADVICE (OUTPATIENT)
Dept: ALLERGY | Facility: CLINIC | Age: 35
End: 2025-07-29
Payer: COMMERCIAL

## 2025-08-05 ENCOUNTER — OFFICE VISIT (OUTPATIENT)
Dept: ALLERGY | Facility: CLINIC | Age: 35
End: 2025-08-05
Payer: COMMERCIAL

## 2025-08-05 DIAGNOSIS — L85.3 XEROSIS CUTIS: ICD-10-CM

## 2025-08-05 DIAGNOSIS — L81.0 HYPERPIGMENTATION OF SKIN, POSTINFLAMMATORY: ICD-10-CM

## 2025-08-05 DIAGNOSIS — L20.89 OTHER ATOPIC DERMATITIS: Primary | ICD-10-CM

## 2025-08-05 PROCEDURE — 99214 OFFICE O/P EST MOD 30 MIN: CPT | Performed by: DERMATOLOGY

## 2025-08-05 RX ORDER — EMOLLIENT BASE
CREAM (GRAM) TOPICAL 2 TIMES DAILY
Qty: 453 G | Refills: 5 | Status: SHIPPED | OUTPATIENT
Start: 2025-08-05

## 2025-08-05 RX ORDER — TRIAMCINOLONE ACETONIDE 1 MG/G
OINTMENT TOPICAL 2 TIMES DAILY PRN
Qty: 80 G | Refills: 3 | Status: SHIPPED | OUTPATIENT
Start: 2025-08-05

## 2025-08-07 ENCOUNTER — VIRTUAL VISIT (OUTPATIENT)
Dept: OBGYN | Facility: CLINIC | Age: 35
End: 2025-08-07
Payer: COMMERCIAL

## 2025-08-07 DIAGNOSIS — O09.529 AMA (ADVANCED MATERNAL AGE) MULTIGRAVIDA 35+: Primary | ICD-10-CM

## 2025-08-14 ENCOUNTER — MYC MEDICAL ADVICE (OUTPATIENT)
Dept: DERMATOLOGY | Facility: CLINIC | Age: 35
End: 2025-08-14
Payer: COMMERCIAL

## 2025-08-18 LAB
ABO + RH BLD: NORMAL
BLD GP AB SCN SERPL QL: NEGATIVE
SPECIMEN EXP DATE BLD: NORMAL

## 2025-08-19 ENCOUNTER — ANCILLARY PROCEDURE (OUTPATIENT)
Dept: ULTRASOUND IMAGING | Facility: CLINIC | Age: 35
End: 2025-08-19
Attending: OBSTETRICS & GYNECOLOGY
Payer: COMMERCIAL

## 2025-08-19 ENCOUNTER — PRENATAL OFFICE VISIT (OUTPATIENT)
Dept: OBGYN | Facility: CLINIC | Age: 35
End: 2025-08-19
Payer: COMMERCIAL

## 2025-08-19 VITALS
SYSTOLIC BLOOD PRESSURE: 115 MMHG | WEIGHT: 129.5 LBS | HEART RATE: 81 BPM | BODY MASS INDEX: 22.08 KG/M2 | DIASTOLIC BLOOD PRESSURE: 73 MMHG | OXYGEN SATURATION: 99 %

## 2025-08-19 DIAGNOSIS — O09.521 MULTIGRAVIDA OF ADVANCED MATERNAL AGE IN FIRST TRIMESTER: ICD-10-CM

## 2025-08-19 DIAGNOSIS — O09.529 AMA (ADVANCED MATERNAL AGE) MULTIGRAVIDA 35+: ICD-10-CM

## 2025-08-19 DIAGNOSIS — O21.0 HYPEREMESIS GRAVIDARUM: Primary | ICD-10-CM

## 2025-08-19 LAB
ALBUMIN UR-MCNC: 30 MG/DL
APPEARANCE UR: CLEAR
BACTERIA #/AREA URNS HPF: ABNORMAL /HPF
BILIRUB UR QL STRIP: NEGATIVE
COLOR UR AUTO: YELLOW
ERYTHROCYTE [DISTWIDTH] IN BLOOD BY AUTOMATED COUNT: 13.6 % (ref 10–15)
EST. AVERAGE GLUCOSE BLD GHB EST-MCNC: 97 MG/DL
GLUCOSE UR STRIP-MCNC: NEGATIVE MG/DL
HBA1C MFR BLD: 5 % (ref 0–5.6)
HBV SURFACE AG SERPL QL IA: NONREACTIVE
HCT VFR BLD AUTO: 33.5 % (ref 35–47)
HCV AB SERPL QL IA: NONREACTIVE
HGB BLD-MCNC: 11.4 G/DL (ref 11.7–15.7)
HGB UR QL STRIP: NEGATIVE
HIV 1+2 AB+HIV1 P24 AG SERPL QL IA: NONREACTIVE
KETONES UR STRIP-MCNC: 20 MG/DL
LEUKOCYTE ESTERASE UR QL STRIP: NEGATIVE
MCH RBC QN AUTO: 30.3 PG (ref 26.5–33)
MCHC RBC AUTO-ENTMCNC: 34 G/DL (ref 31.5–36.5)
MCV RBC AUTO: 89.1 FL (ref 78–100)
MIXED CELL CASTS #/AREA URNS LPF: ABNORMAL /LPF
MUCOUS THREADS #/AREA URNS LPF: PRESENT /LPF
NITRATE UR QL: NEGATIVE
PH UR STRIP: 7 [PH] (ref 5–7)
PLATELET # BLD AUTO: 215 10E3/UL (ref 150–450)
RBC # BLD AUTO: 3.76 10E6/UL (ref 3.8–5.2)
RBC #/AREA URNS AUTO: ABNORMAL /HPF
RUBV IGG SERPL QL IA: 24.9 INDEX
RUBV IGG SERPL QL IA: POSITIVE
SP GR UR STRIP: 1.02 (ref 1–1.03)
T PALLIDUM AB SER QL: NONREACTIVE
UROBILINOGEN UR STRIP-MCNC: NORMAL MG/DL
WBC # BLD AUTO: 2.76 10E3/UL (ref 4–11)
WBC #/AREA URNS AUTO: ABNORMAL /HPF

## 2025-08-19 PROCEDURE — 76801 OB US < 14 WKS SINGLE FETUS: CPT

## 2025-08-19 PROCEDURE — 76817 TRANSVAGINAL US OBSTETRIC: CPT

## 2025-08-19 RX ORDER — VITAMIN A ACETATE, .BETA.-CAROTENE, ASCORBIC ACID, CHOLECALCIFEROL, .ALPHA.-TOCOPHEROL ACETATE, DL-, THIAMINE MONONITRATE, RIBOFLAVIN, NIACINAMIDE, PYRIDOXINE HYDROCHLORIDE, FOLIC ACID, CYANOCOBALAMIN, CALCIUM CARBONATE, FERROUS FUMARATE, ZINC OXIDE, AND CUPRIC OXIDE 2000; 2000; 120; 400; 22; 1.84; 3; 20; 10; 1; 12; 200; 27; 25; 2 [IU]/1; [IU]/1; MG/1; [IU]/1; MG/1; MG/1; MG/1; MG/1; MG/1; MG/1; UG/1; MG/1; MG/1; MG/1; MG/1
1 TABLET ORAL DAILY
Qty: 90 TABLET | Refills: 3 | Status: SHIPPED | OUTPATIENT
Start: 2025-08-19

## 2025-08-19 RX ORDER — ONDANSETRON 8 MG/1
8 TABLET, ORALLY DISINTEGRATING ORAL EVERY 8 HOURS PRN
Qty: 30 TABLET | Refills: 1 | Status: SHIPPED | OUTPATIENT
Start: 2025-08-19

## 2025-08-20 LAB
BACTERIA UR CULT: NO GROWTH
C TRACH DNA SPEC QL NAA+PROBE: NEGATIVE
N GONORRHOEA DNA SPEC QL NAA+PROBE: NEGATIVE
SPECIMEN TYPE: NORMAL
SPECIMEN TYPE: NORMAL

## 2025-08-26 LAB — SCANNED LAB RESULT: NORMAL
